# Patient Record
Sex: MALE | Race: WHITE | NOT HISPANIC OR LATINO | Employment: FULL TIME | URBAN - METROPOLITAN AREA
[De-identification: names, ages, dates, MRNs, and addresses within clinical notes are randomized per-mention and may not be internally consistent; named-entity substitution may affect disease eponyms.]

---

## 2017-03-07 ENCOUNTER — DOCUMENTATION ONLY (OUTPATIENT)
Dept: FAMILY MEDICINE | Facility: CLINIC | Age: 62
End: 2017-03-07

## 2017-03-07 NOTE — PROGRESS NOTES
Pre-Visit Chart Review  For Appointment Scheduled on 3-8-17    Health Maintenance Due   Topic Date Due    TETANUS VACCINE  11/28/1972    Colonoscopy  02/01/2016    Influenza Vaccine  08/01/2016

## 2017-03-08 ENCOUNTER — OFFICE VISIT (OUTPATIENT)
Dept: FAMILY MEDICINE | Facility: CLINIC | Age: 62
End: 2017-03-08
Payer: COMMERCIAL

## 2017-03-08 ENCOUNTER — LAB VISIT (OUTPATIENT)
Dept: LAB | Facility: HOSPITAL | Age: 62
End: 2017-03-08
Attending: FAMILY MEDICINE
Payer: COMMERCIAL

## 2017-03-08 VITALS
BODY MASS INDEX: 27.48 KG/M2 | HEART RATE: 60 BPM | SYSTOLIC BLOOD PRESSURE: 112 MMHG | DIASTOLIC BLOOD PRESSURE: 73 MMHG | WEIGHT: 175.06 LBS | HEIGHT: 67 IN | TEMPERATURE: 98 F

## 2017-03-08 DIAGNOSIS — Z12.5 PROSTATE CANCER SCREENING: ICD-10-CM

## 2017-03-08 DIAGNOSIS — G47.00 INSOMNIA, UNSPECIFIED TYPE: ICD-10-CM

## 2017-03-08 DIAGNOSIS — D64.9 ANEMIA, UNSPECIFIED TYPE: ICD-10-CM

## 2017-03-08 DIAGNOSIS — K21.9 GASTROESOPHAGEAL REFLUX DISEASE, ESOPHAGITIS PRESENCE NOT SPECIFIED: Primary | ICD-10-CM

## 2017-03-08 DIAGNOSIS — M10.9 GOUT, ARTHRITIS: ICD-10-CM

## 2017-03-08 DIAGNOSIS — I10 ESSENTIAL HYPERTENSION: ICD-10-CM

## 2017-03-08 DIAGNOSIS — J01.00 ACUTE NON-RECURRENT MAXILLARY SINUSITIS: ICD-10-CM

## 2017-03-08 DIAGNOSIS — R13.10 DYSPHAGIA, UNSPECIFIED TYPE: ICD-10-CM

## 2017-03-08 DIAGNOSIS — E78.5 HYPERLIPIDEMIA, UNSPECIFIED HYPERLIPIDEMIA TYPE: ICD-10-CM

## 2017-03-08 DIAGNOSIS — M62.838 NECK MUSCLE SPASM: ICD-10-CM

## 2017-03-08 LAB
ALBUMIN SERPL BCP-MCNC: 3.5 G/DL
ALP SERPL-CCNC: 51 U/L
ALT SERPL W/O P-5'-P-CCNC: 34 U/L
ANION GAP SERPL CALC-SCNC: 8 MMOL/L
AST SERPL-CCNC: 36 U/L
BASOPHILS # BLD AUTO: 0.02 K/UL
BASOPHILS NFR BLD: 0.3 %
BILIRUB SERPL-MCNC: 0.8 MG/DL
BUN SERPL-MCNC: 15 MG/DL
CALCIUM SERPL-MCNC: 9.4 MG/DL
CHLORIDE SERPL-SCNC: 107 MMOL/L
CHOLEST/HDLC SERPL: 4.1 {RATIO}
CO2 SERPL-SCNC: 27 MMOL/L
COMPLEXED PSA SERPL-MCNC: 0.4 NG/ML
CREAT SERPL-MCNC: 1 MG/DL
DIFFERENTIAL METHOD: ABNORMAL
EOSINOPHIL # BLD AUTO: 0.2 K/UL
EOSINOPHIL NFR BLD: 3 %
ERYTHROCYTE [DISTWIDTH] IN BLOOD BY AUTOMATED COUNT: 13.8 %
EST. GFR  (AFRICAN AMERICAN): >60 ML/MIN/1.73 M^2
EST. GFR  (NON AFRICAN AMERICAN): >60 ML/MIN/1.73 M^2
GLUCOSE SERPL-MCNC: 100 MG/DL
HCT VFR BLD AUTO: 41.2 %
HDL/CHOLESTEROL RATIO: 24.6 %
HDLC SERPL-MCNC: 171 MG/DL
HDLC SERPL-MCNC: 42 MG/DL
HGB BLD-MCNC: 13.2 G/DL
LDLC SERPL CALC-MCNC: 111 MG/DL
LYMPHOCYTES # BLD AUTO: 1.8 K/UL
LYMPHOCYTES NFR BLD: 29.2 %
MCH RBC QN AUTO: 30.1 PG
MCHC RBC AUTO-ENTMCNC: 32 %
MCV RBC AUTO: 94 FL
MONOCYTES # BLD AUTO: 0.7 K/UL
MONOCYTES NFR BLD: 12.1 %
NEUTROPHILS # BLD AUTO: 3.3 K/UL
NEUTROPHILS NFR BLD: 55.2 %
NONHDLC SERPL-MCNC: 129 MG/DL
PLATELET # BLD AUTO: 188 K/UL
PMV BLD AUTO: 10.3 FL
POTASSIUM SERPL-SCNC: 4.4 MMOL/L
PROT SERPL-MCNC: 7.3 G/DL
RBC # BLD AUTO: 4.38 M/UL
SODIUM SERPL-SCNC: 142 MMOL/L
TRIGL SERPL-MCNC: 90 MG/DL
URATE SERPL-MCNC: 4.9 MG/DL
WBC # BLD AUTO: 6.03 K/UL

## 2017-03-08 PROCEDURE — 1160F RVW MEDS BY RX/DR IN RCRD: CPT | Mod: S$GLB,,, | Performed by: FAMILY MEDICINE

## 2017-03-08 PROCEDURE — 80061 LIPID PANEL: CPT

## 2017-03-08 PROCEDURE — 84153 ASSAY OF PSA TOTAL: CPT

## 2017-03-08 PROCEDURE — 99999 PR PBB SHADOW E&M-EST. PATIENT-LVL III: CPT | Mod: PBBFAC,,, | Performed by: FAMILY MEDICINE

## 2017-03-08 PROCEDURE — 80053 COMPREHEN METABOLIC PANEL: CPT

## 2017-03-08 PROCEDURE — 85025 COMPLETE CBC W/AUTO DIFF WBC: CPT

## 2017-03-08 PROCEDURE — 84550 ASSAY OF BLOOD/URIC ACID: CPT

## 2017-03-08 PROCEDURE — 99214 OFFICE O/P EST MOD 30 MIN: CPT | Mod: 25,S$GLB,, | Performed by: FAMILY MEDICINE

## 2017-03-08 PROCEDURE — 36415 COLL VENOUS BLD VENIPUNCTURE: CPT | Mod: PO

## 2017-03-08 PROCEDURE — 3078F DIAST BP <80 MM HG: CPT | Mod: S$GLB,,, | Performed by: FAMILY MEDICINE

## 2017-03-08 PROCEDURE — 96372 THER/PROPH/DIAG INJ SC/IM: CPT | Mod: S$GLB,,, | Performed by: FAMILY MEDICINE

## 2017-03-08 PROCEDURE — 3074F SYST BP LT 130 MM HG: CPT | Mod: S$GLB,,, | Performed by: FAMILY MEDICINE

## 2017-03-08 RX ORDER — TIZANIDINE HYDROCHLORIDE 4 MG/1
1 CAPSULE, GELATIN COATED ORAL 3 TIMES DAILY PRN
Qty: 45 CAPSULE | Status: SHIPPED | OUTPATIENT
Start: 2017-03-08 | End: 2017-03-18

## 2017-03-08 RX ORDER — AMOXICILLIN AND CLAVULANATE POTASSIUM 875; 125 MG/1; MG/1
1 TABLET, FILM COATED ORAL 2 TIMES DAILY
Qty: 20 TABLET | Refills: 0 | Status: SHIPPED | OUTPATIENT
Start: 2017-03-08 | End: 2017-03-27

## 2017-03-08 RX ORDER — METHYLPREDNISOLONE ACETATE 40 MG/ML
60 INJECTION, SUSPENSION INTRA-ARTICULAR; INTRALESIONAL; INTRAMUSCULAR; SOFT TISSUE
Status: COMPLETED | OUTPATIENT
Start: 2017-03-08 | End: 2017-03-08

## 2017-03-08 RX ORDER — OMEPRAZOLE 20 MG/1
20 CAPSULE, DELAYED RELEASE ORAL DAILY
Qty: 30 CAPSULE | Refills: 5 | Status: SHIPPED | OUTPATIENT
Start: 2017-03-08 | End: 2017-04-27 | Stop reason: SDUPTHER

## 2017-03-08 RX ORDER — ZOLPIDEM TARTRATE 10 MG/1
10 TABLET ORAL NIGHTLY PRN
Qty: 30 TABLET | Refills: 2 | Status: SHIPPED | OUTPATIENT
Start: 2017-03-08 | End: 2017-12-22 | Stop reason: SDUPTHER

## 2017-03-08 RX ADMIN — METHYLPREDNISOLONE ACETATE 60 MG: 40 INJECTION, SUSPENSION INTRA-ARTICULAR; INTRALESIONAL; INTRAMUSCULAR; SOFT TISSUE at 08:03

## 2017-03-08 NOTE — MR AVS SNAPSHOT
[facility]  [address]  [place] [address]  Phone: [phone]  Fax: [phone]                  Ang Oden Jr.   3/8/2017 8:20 AM   Office Visit    Description:  Male : 1955   Provider:  Jordana Anderson MD   Department:  [facility]           Reason for Visit     Follow-up     Neck Pain     Cough           Diagnoses this Visit        Comments    Gastroesophageal reflux disease, esophagitis presence not specified    -  Primary     Dysphagia, unspecified type         Anemia, unspecified type         Essential hypertension         Hyperlipidemia, unspecified hyperlipidemia type         Gout, arthritis         Prostate cancer screening         Insomnia, unspecified type         Acute non-recurrent maxillary sinusitis         Neck muscle spasm                To Do List           Future Appointments        Provider Department Dept Phone    3/27/2017 3:30 PM Christian Santiago MD [facility] [phone]    2017 4:20 PM Jordana Anderson MD [facility] [phone]      Goals (5 Years of Data)     None      Follow-Up and Disposition     Return in about 6 months (around 2017).       These Medications        Disp Refills Start End    omeprazole (PRILOSEC) 20 MG capsule 30 capsule 5 3/8/2017 3/8/2018    Take 1 capsule (20 mg total) by mouth once daily. - Oral    Pharmacy: [facility]/pharmacy #[id number]  [place] - [address] [address]. Ph #: [phone]       zolpidem (AMBIEN) 10 mg Tab 30 tablet 2 3/8/2017     Take 1 tablet (10 mg total) by mouth nightly as needed. - Oral    Pharmacy: [facility]/pharmacy [id number]  [place] - [address] [facility] [address]. Ph #: [phone]       amoxicillin-clavulanate 875-125mg (AUGMENTIN) 875-125 mg per tablet 20 tablet 0 3/8/2017     Take 1 tablet by mouth 2 (two) times daily. - Oral    Pharmacy: [facility]/pharmacy #[id number] - [place]  [address] [facility]. Ph #: [phone]       tizanidine 4 mg Cap 45 capsule prn 3/8/2017  3/18/2017    Take 1 capsule by mouth 3 (three) times daily as needed. - Oral    Pharmacy: [facility]/pharmacy #[id number] - [place], [place] - [address]  #: [phone]         [facility]     [facility] On [facility] Care Line - 24/7 Assistance  Registered nurses in the [facility] provide clinical advisement, health education, appointment booking, and other advisory services.  Call for this free service at [phone].             Medications           Message regarding Medications     Verify the changes and/or additions to your medication regime listed below are the same as discussed with your clinician today.  If any of these changes or additions are incorrect, please notify your healthcare provider.        START taking these NEW medications        Refills    omeprazole (PRILOSEC) 20 MG capsule 5    Sig: Take 1 capsule (20 mg total) by mouth once daily.    Class: Normal    Route: Oral    amoxicillin-clavulanate 875-125mg (AUGMENTIN) 875-125 mg per tablet 0    Sig: Take 1 tablet by mouth 2 (two) times daily.    Class: Normal    Route: Oral    tizanidine 4 mg Cap prn    Sig: Take 1 capsule by mouth 3 (three) times daily as needed.    Class: Normal    Route: Oral      These medications were administered today        Dose Freq    methylPREDNISolone acetate injection 60 mg 60 mg Clinic/HOD 1 time    Sig: Inject 1.5 mLs (60 mg total) into the muscle one time.    Class: Normal    Route: Intramuscular      STOP taking these medications     buPROPion (WELLBUTRIN XL) 150 MG TB24 tablet Take 1 tablet (150 mg total) by mouth once daily.           Verify that the below list of medications is an accurate representation of the medications you are currently taking.  If none reported, the list may be blank. If incorrect, please contact your healthcare provider. Carry this list with you in case of emergency.           Current Medications     allopurinol (ZYLOPRIM) 300 MG tablet Take 1 tablet (300 mg total) by mouth once  "daily.    amlodipine-benazepril 5-20 mg (LOTREL) 5-20 mg per capsule Take 1 capsule by mouth once daily.    aspirin (ASPIR-81) 81 MG EC tablet Take by mouth. 1 Tablet, Delayed Release (E.C.) Oral Every day    citalopram (CELEXA) 20 MG tablet Take 20 mg by mouth once daily.    dextromethorphan-guaifenesin  mg (MUCINEX DM)  mg per 12 hr tablet Take 1 tablet by mouth every 12 (twelve) hours.    fluticasone (FLONASE) 50 mcg/actuation nasal spray 1 spray by Each Nare route once daily.    ibuprofen (ADVIL,MOTRIN) 800 MG tablet TAKE 1 TABLET BY MOUTH EVERY 6 TO 8 HOURS AS NEEDED FOR PAIN    IRON,CARBONYL/ASCORBIC ACID (IRON-VITAMIN C ORAL) Take by mouth.    ketoconazole (NIZORAL) 2 % cream Apply to affected area as directed bid    nebivolol (BYSTOLIC) 10 MG Tab Take 1 tablet (10 mg total) by mouth once daily.    simvastatin (ZOCOR) 10 MG tablet Take 1 tablet (10 mg total) by mouth every evening.    zolpidem (AMBIEN) 10 mg Tab Take 1 tablet (10 mg total) by mouth nightly as needed.    amoxicillin-clavulanate 875-125mg (AUGMENTIN) 875-125 mg per tablet Take 1 tablet by mouth 2 (two) times daily.    omeprazole (PRILOSEC) 20 MG capsule Take 1 capsule (20 mg total) by mouth once daily.    tizanidine 4 mg Cap Take 1 capsule by mouth 3 (three) times daily as needed.           Clinical Reference Information           Your Vitals Were     BP Pulse Temp Height Weight BMI    112/73 (BP Location: Right arm, Patient Position: Sitting, BP Method: Automatic) 60 98.1 °F (36.7 °C) (Oral) 5' 7" (1.702 m) 79.4 kg (175 lb 0.7 oz) 27.42 kg/m2      Blood Pressure          Most Recent Value    BP  112/73      Allergies as of 3/8/2017     No Known Drug Allergies      Immunizations Administered on Date of Encounter - 3/8/2017     None      Orders Placed During Today's Visit      Normal Orders This Visit    Ambulatory referral to Gastroenterology     Future Labs/Procedures Expected by Expires    CBC auto differential  3/8/2017 5/7/2018 "    Comprehensive metabolic panel  3/8/2017 5/7/2018    Lipid panel  3/8/2017 5/7/2018    PSA, Screening  3/8/2017 5/7/2018    Uric acid  3/8/2017 5/7/2018      Administrations This Visit     methylPREDNISolone acetate injection 60 mg     Admin Date Action Dose Route Administered By             03/08/2017 Given 60 mg Intramuscular Altagracia Siu LPN                      Language Assistance Services     ATTENTION: Language assistance services are available, free of charge. Please call [phone].      ATENCIÓN: Si hillla opalchris, tiene a posey disposición servicios gratuitos de asistencia lingüística. Llame al [phone].     CHÚ Ý: N?u b?n nói Ti?ng Vi?t, có các d?ch v? h? tr? ngôn ng? mi?n phí dành cho b?n. G?i s? [phone].         [facility] complies with applicable Federal civil rights laws and does not discriminate on the basis of race, color, national origin, age, disability, or sex.

## 2017-03-08 NOTE — NURSING
2 Patient identifiers used (name & ). Administered 60 mg Depo Medrol IM. Patient tolerated well. No bleeding at insertion site noted. Pain scale 0/10. Allergies reviewed. Aseptic technique maintained.

## 2017-03-13 NOTE — PROGRESS NOTES
Subjective:       Patient ID: Ang Oden Jr. is a 61 y.o. male.    Chief Complaint: Follow-up; Neck Pain; and Cough    Patient Active Problem List   Diagnosis    Olecranon bursitis    Gout, arthritis    Hyperlipidemia    Anemia, Hgb 13.1    Lumbago due to displacement of intervertebral disc    Male hypogonadism    Alcohol abuse with intoxication, daily    Alcohol induced fatty liver    KYLEIGH (obstructive sleep apnea), not using CPAP    Kidney calculi    BPH (benign prostatic hypertrophy)    Diverticulosis    Cardiovascular risk factor, FCVRS 5% on Zocor    Insomnia    Anxiety    Allergic rhinitis    Essential hypertension    History of alcohol abuse   C/o recurrence of same sx as esophageal stricture-chokes on solid foods, get stuck    HPI  Review of Systems   Constitutional: Positive for fever. Negative for chills.   HENT: Positive for postnasal drip, rhinorrhea, sinus pressure and sneezing. Negative for ear discharge, ear pain and sore throat.    Respiratory: Negative for cough.    Neurological: Positive for headaches.       Objective:      Physical Exam   Constitutional: He appears well-developed and well-nourished.   HENT:   Right Ear: Tympanic membrane and ear canal normal.   Left Ear: Tympanic membrane and ear canal normal.   Nose: Mucosal edema present. Right sinus exhibits maxillary sinus tenderness. Right sinus exhibits no frontal sinus tenderness. Left sinus exhibits maxillary sinus tenderness. Left sinus exhibits no frontal sinus tenderness.   Mouth/Throat: Mucous membranes are normal. Posterior oropharyngeal erythema present. No oropharyngeal exudate, posterior oropharyngeal edema or tonsillar abscesses.   Cardiovascular: Normal rate, regular rhythm and normal heart sounds.    Pulmonary/Chest: Effort normal and breath sounds normal.   Skin: Skin is warm.   Psychiatric: He has a normal mood and affect.   Vitals reviewed.      Assessment:       1. Gastroesophageal reflux disease,  esophagitis presence not specified    2. Dysphagia, unspecified type    3. Anemia, unspecified type    4. Essential hypertension    5. Hyperlipidemia, unspecified hyperlipidemia type    6. Gout, arthritis    7. Prostate cancer screening    8. Insomnia, unspecified type    9. Acute non-recurrent maxillary sinusitis    10. Neck muscle spasm        Plan:       1. Gastroesophageal reflux disease, esophagitis presence not specified  Refer for eval and treat  - Ambulatory referral to Gastroenterology  - omeprazole (PRILOSEC) 20 MG capsule; Take 1 capsule (20 mg total) by mouth once daily.  Dispense: 30 capsule; Refill: 5    2. Dysphagia, unspecified type  Refer for eval and treatment  - Ambulatory referral to Gastroenterology    3. Anemia, unspecified type  Screen and treat as indicated:    - CBC auto differential; Future    4. Essential hypertension  Controlled on current medications.  Continue current medications.      5. Hyperlipidemia, unspecified hyperlipidemia type  Stable condition.  Continue current medications.  Will adjust based on lab findings or if condition changes.    - Lipid panel; Future  - Comprehensive metabolic panel; Future    6. Gout, arthritis  Stable condition.  Continue current medications.  Will adjust based on lab findings or if condition changes.    - Uric acid; Future    7. Prostate cancer screening  Screen and treat as indicated:  Discussed benefits, risks and limitations of PSA testing and current USPSTF guidelines.  Patient expressed verbal understanding and wished to pursue screening.    - PSA, Screening; Future    8. Insomnia, unspecified type  Controlled on current medications.  Continue current medications.    - zolpidem (AMBIEN) 10 mg Tab; Take 1 tablet (10 mg total) by mouth nightly as needed.  Dispense: 30 tablet; Refill: 2    9. Acute non-recurrent maxillary sinusitis  Treat:  - amoxicillin-clavulanate 875-125mg (AUGMENTIN) 875-125 mg per tablet; Take 1 tablet by mouth 2 (two) times  daily.  Dispense: 20 tablet; Refill: 0  - methylPREDNISolone acetate injection 60 mg; Inject 1.5 mLs (60 mg total) into the muscle one time.    10. Neck muscle spasm  Use prn  - tizanidine 4 mg Cap; Take 1 capsule by mouth 3 (three) times daily as needed.  Dispense: 45 capsule; Refill: prn    Reeval 6 months or sooner prn

## 2017-03-17 ENCOUNTER — TELEPHONE (OUTPATIENT)
Dept: FAMILY MEDICINE | Facility: CLINIC | Age: 62
End: 2017-03-17

## 2017-03-17 NOTE — TELEPHONE ENCOUNTER
----- Message from Carmen Olvera sent at 3/17/2017 12:35 PM CDT -----  Contact: self  Needs a refill on antibiotics, states he is still sick.  Please call back at     [facility]/pharmacy #[id number] - [place] - [address]  [address] [address]  Phone: [phone] Fax: [phone]

## 2017-03-17 NOTE — TELEPHONE ENCOUNTER
Patient seen 3/8/17 and was treated for sinusitis. He will finish his augmentin tomorrow AM. Pateint is still having sinus stuffiness, cough and post nasal drip(causing the cough).  He reports that he is afebrile. Mucus is now clear. Patient requesting additional medication. Please advise.

## 2017-03-17 NOTE — TELEPHONE ENCOUNTER
If he has no fever or pain and discharge is clear then I suspect allergic rhinitis.  Recommend otc non-sedating antihistamine such as Loratadine and/or steroid nasal spray such as Flonase as directed and as needed.  Please return to clinic if symptoms persist after these interventions.

## 2017-03-21 NOTE — TELEPHONE ENCOUNTER
Patient read message as written, patient verbalized understanding. Will call Friday and advise us as to how he feels.

## 2017-03-22 ENCOUNTER — TELEPHONE (OUTPATIENT)
Dept: FAMILY MEDICINE | Facility: CLINIC | Age: 62
End: 2017-03-22

## 2017-03-22 NOTE — TELEPHONE ENCOUNTER
----- Message from Sakina Damon sent at 3/22/2017  7:12 AM CDT -----  Patient is returning nurses call regarding over the counter medication he was recommended to purchase for his sinus issues, he is requesting a message be left on his phone including name of medication and how to spell it, he states he can not remember what was recommended.  Contact patient at [phone].    Thank you

## 2017-03-22 NOTE — TELEPHONE ENCOUNTER
Called and left message as requested with the names of the medications suggested by Dr Anderson on 3/17/17 to treat the patients allergic rhinitis. Loratidine and/or Flonase.

## 2017-03-27 ENCOUNTER — INITIAL CONSULT (OUTPATIENT)
Dept: GASTROENTEROLOGY | Facility: CLINIC | Age: 62
End: 2017-03-27
Payer: COMMERCIAL

## 2017-03-27 ENCOUNTER — TELEPHONE (OUTPATIENT)
Dept: FAMILY MEDICINE | Facility: CLINIC | Age: 62
End: 2017-03-27

## 2017-03-27 VITALS
SYSTOLIC BLOOD PRESSURE: 104 MMHG | WEIGHT: 174.19 LBS | DIASTOLIC BLOOD PRESSURE: 76 MMHG | BODY MASS INDEX: 27.34 KG/M2 | HEIGHT: 67 IN | HEART RATE: 63 BPM | RESPIRATION RATE: 16 BRPM

## 2017-03-27 DIAGNOSIS — K70.0 ALCOHOL INDUCED FATTY LIVER: Primary | Chronic | ICD-10-CM

## 2017-03-27 DIAGNOSIS — R13.10 DYSPHAGIA, UNSPECIFIED TYPE: ICD-10-CM

## 2017-03-27 DIAGNOSIS — R13.10 DYSPHAGIA, UNSPECIFIED TYPE: Primary | ICD-10-CM

## 2017-03-27 PROCEDURE — 99999 PR PBB SHADOW E&M-EST. PATIENT-LVL III: CPT | Mod: PBBFAC,,, | Performed by: INTERNAL MEDICINE

## 2017-03-27 PROCEDURE — 1160F RVW MEDS BY RX/DR IN RCRD: CPT | Mod: S$GLB,,, | Performed by: INTERNAL MEDICINE

## 2017-03-27 PROCEDURE — 3074F SYST BP LT 130 MM HG: CPT | Mod: S$GLB,,, | Performed by: INTERNAL MEDICINE

## 2017-03-27 PROCEDURE — 99204 OFFICE O/P NEW MOD 45 MIN: CPT | Mod: S$GLB,,, | Performed by: INTERNAL MEDICINE

## 2017-03-27 PROCEDURE — 3078F DIAST BP <80 MM HG: CPT | Mod: S$GLB,,, | Performed by: INTERNAL MEDICINE

## 2017-03-27 RX ORDER — TIZANIDINE HYDROCHLORIDE 4 MG/1
CAPSULE, GELATIN COATED ORAL
Refills: 99 | COMMUNITY
Start: 2017-03-21 | End: 2017-09-06

## 2017-03-27 NOTE — TELEPHONE ENCOUNTER
----- Message from Nancy Shah sent at 3/27/2017  8:13 AM CDT -----  Contact: self  Patient called regarding medications and sinus problems. Told to use over the counter, for sinus problems and still having same issues.  Please contact [phone] ([email or web address])

## 2017-03-27 NOTE — PROGRESS NOTES
"Subjective:       Patient ID: Ang Oden Jr. is a 61 y.o. male.    This patient is new to me.  He has been seen in GI clinic in the past but this was greater than 3 years ago  Referring MD:  Dr. Anderson for dysphagia.        Chief Complaint: Dysphagia.    HPI Comments: Patient seen for dysphagia, occurring with solid foods, frequency increasing, alleviated/exacerbated by nothing in particulat, associated signs/symptoms including none, onset a few months ago.  He denies epigastric pain, weight loss, GI bleeding, change in bowel habits, or family history of GI cancer.  He has quit drinking and recent liver enzymes looked good.  His most recent imaging showed fatty liver.  No other complaints at this time.    He has been given a trial of PPI per notes from Dr. Anderson but states that he doesn't feel this is doing much.      Review of Systems   Constitutional: Negative for chills, fatigue and fever.   HENT: Positive for trouble swallowing.    Respiratory: Negative for cough, shortness of breath and wheezing.    Cardiovascular: Negative for chest pain and palpitations.   Gastrointestinal: Negative for abdominal pain, constipation, diarrhea, nausea and vomiting.   Musculoskeletal: Negative for arthralgias and myalgias.   Skin: Negative for color change and rash.   Neurological: Negative for dizziness, weakness and numbness.   Psychiatric/Behavioral: Negative for confusion. The patient is not nervous/anxious.    All other systems reviewed and are negative.      Objective:       Vitals:    03/27/17 1512   BP: 104/76   Pulse: 63   Resp: 16   Weight: 79 kg (174 lb 2.6 oz)   Height: 5' 7" (1.702 m)       Physical Exam   Constitutional: He is oriented to person, place, and time. He appears well-developed and well-nourished.   HENT:   Head: Normocephalic and atraumatic.   Mouth/Throat: Oropharynx is clear and moist.   Eyes: Conjunctivae are normal. Pupils are equal, round, and reactive to light. No scleral icterus.   Neck: " Normal range of motion. Neck supple. No thyromegaly present.   Cardiovascular: Normal rate and regular rhythm.    No murmur heard.  Pulmonary/Chest: Effort normal and breath sounds normal. He has no wheezes.   Abdominal: Soft. Bowel sounds are normal. He exhibits no distension. There is no tenderness. There is no rebound and no guarding.   Musculoskeletal: He exhibits no edema.   Lymphadenopathy:     He has no cervical adenopathy.   Neurological: He is alert and oriented to person, place, and time.   Skin: Skin is warm and dry. No rash noted. No erythema.   Psychiatric: He has a normal mood and affect. His behavior is normal.   Vitals reviewed.        Lab Results   Component Value Date    WBC 6.03 03/08/2017    HGB 13.2 (L) 03/08/2017    HCT 41.2 03/08/2017    MCV 94 03/08/2017     03/08/2017       Old records from Dr. Anderson reviewed and are as summarized above in the HPI.    Ultrasound was independently visualized and reviewed by me and showed fatty liver.    Assessment:       1. Alcohol induced fatty liver    2. Dysphagia, unspecified type        Plan:       1.  Continue PPI for now  2.  Avoid NSAIDs  3.  Antireflux precautions including avoidance of late night eating and lying down soon after eating.    4.  Schedule EGD with possible dilation  5.  Further recommendations to follow after above.  6.  Communication will be sent to the referring MD, Dr. Anderson regarding my assessment and plan on this patient via EPIC.

## 2017-03-27 NOTE — TELEPHONE ENCOUNTER
Patient was advised upon last phone call that if OTC medication did not clear his sinus issues he was to make a appointment to be re-evaluated. Offered patient multiple appointment times and patient declined each appointment that was offered. States he will call back for a appointment.

## 2017-03-27 NOTE — LETTER
March 27, 2017      Jordana Anderson MD  [address] [facility] [facility] [address]           [place] [facility]  [address]  [place] [address]  Phone: [phone]          Patient: Ang Oden Jr.   MR Number: [id number]   YOB: 1955   Date of Visit: 3/27/2017       Dear Dr. Jordana Anderson:    Thank you for referring Ang Oden to me for evaluation. Attached you will find relevant portions of my assessment and plan of care.    If you have questions, please do not hesitate to call me. I look forward to following Ang Oden along with you.    Sincerely,    Christian Santiago MD    Enclosure  CC:  No Recipients    If you would like to receive this communication electronically, please contact [email or web address] or [phone] to request more information on [email or web address] Link access.    For providers and/or their staff who would like to refer a patient to [facility], please contact us through our one-stop-shop provider referral line, [facility], at [phone].    If you feel you have received this communication in error or would no longer like to receive these types of communications, please e-mail [email or web address]

## 2017-03-28 DIAGNOSIS — R13.10 DYSPHAGIA, UNSPECIFIED TYPE: Primary | ICD-10-CM

## 2017-04-10 ENCOUNTER — ANESTHESIA (OUTPATIENT)
Dept: ENDOSCOPY | Facility: HOSPITAL | Age: 62
End: 2017-04-10
Payer: COMMERCIAL

## 2017-04-10 ENCOUNTER — SURGERY (OUTPATIENT)
Age: 62
End: 2017-04-10

## 2017-04-10 ENCOUNTER — HOSPITAL ENCOUNTER (OUTPATIENT)
Facility: HOSPITAL | Age: 62
Discharge: HOME | End: 2017-04-10
Attending: INTERNAL MEDICINE | Admitting: INTERNAL MEDICINE
Payer: COMMERCIAL

## 2017-04-10 ENCOUNTER — ANESTHESIA EVENT (OUTPATIENT)
Dept: ENDOSCOPY | Facility: HOSPITAL | Age: 62
End: 2017-04-10
Payer: COMMERCIAL

## 2017-04-10 VITALS — RESPIRATION RATE: 19 BRPM

## 2017-04-10 DIAGNOSIS — R13.10 DYSPHAGIA: ICD-10-CM

## 2017-04-10 DIAGNOSIS — K22.2 SCHATZKI'S RING: Primary | ICD-10-CM

## 2017-04-10 DIAGNOSIS — K29.70 GASTRITIS, PRESENCE OF BLEEDING UNSPECIFIED, UNSPECIFIED CHRONICITY, UNSPECIFIED GASTRITIS TYPE: ICD-10-CM

## 2017-04-10 DIAGNOSIS — K31.7 GASTRIC POLYP: ICD-10-CM

## 2017-04-10 DIAGNOSIS — K44.9 HIATAL HERNIA: ICD-10-CM

## 2017-04-10 PROCEDURE — D9220A PRA ANESTHESIA: Mod: ANES,,, | Performed by: ANESTHESIOLOGY

## 2017-04-10 PROCEDURE — D9220A PRA ANESTHESIA: Mod: CRNA,,, | Performed by: NURSE ANESTHETIST, CERTIFIED REGISTERED

## 2017-04-10 PROCEDURE — 25000003 PHARM REV CODE 250

## 2017-04-10 PROCEDURE — 88305 TISSUE EXAM BY PATHOLOGIST: CPT | Performed by: PATHOLOGY

## 2017-04-10 PROCEDURE — 37000009 HC ANESTHESIA EA ADD 15 MINS: Performed by: INTERNAL MEDICINE

## 2017-04-10 PROCEDURE — 37000008 HC ANESTHESIA 1ST 15 MINUTES: Performed by: INTERNAL MEDICINE

## 2017-04-10 PROCEDURE — 25000003 PHARM REV CODE 250: Performed by: INTERNAL MEDICINE

## 2017-04-10 PROCEDURE — 43239 EGD BIOPSY SINGLE/MULTIPLE: CPT | Mod: ,,, | Performed by: INTERNAL MEDICINE

## 2017-04-10 PROCEDURE — 27201012 HC FORCEPS, HOT/COLD, DISP: Performed by: INTERNAL MEDICINE

## 2017-04-10 PROCEDURE — 88342 IMHCHEM/IMCYTCHM 1ST ANTB: CPT | Mod: 26,,, | Performed by: PATHOLOGY

## 2017-04-10 PROCEDURE — 43248 EGD GUIDE WIRE INSERTION: CPT | Performed by: INTERNAL MEDICINE

## 2017-04-10 PROCEDURE — 43239 EGD BIOPSY SINGLE/MULTIPLE: CPT | Performed by: INTERNAL MEDICINE

## 2017-04-10 PROCEDURE — 63600175 PHARM REV CODE 636 W HCPCS

## 2017-04-10 PROCEDURE — 43248 EGD GUIDE WIRE INSERTION: CPT | Mod: ,,, | Performed by: INTERNAL MEDICINE

## 2017-04-10 RX ORDER — PROPOFOL 10 MG/ML
INJECTION, EMULSION INTRAVENOUS
Status: COMPLETED
Start: 2017-04-10 | End: 2017-04-10

## 2017-04-10 RX ORDER — SODIUM CHLORIDE 9 MG/ML
INJECTION, SOLUTION INTRAVENOUS CONTINUOUS
Status: DISCONTINUED | OUTPATIENT
Start: 2017-04-10 | End: 2017-04-10 | Stop reason: HOSPADM

## 2017-04-10 RX ORDER — LIDOCAINE HYDROCHLORIDE 20 MG/ML
INJECTION, SOLUTION EPIDURAL; INFILTRATION; INTRACAUDAL; PERINEURAL
Status: COMPLETED
Start: 2017-04-10 | End: 2017-04-10

## 2017-04-10 RX ADMIN — LIDOCAINE HYDROCHLORIDE 100 MG: 20 INJECTION, SOLUTION EPIDURAL; INFILTRATION; INTRACAUDAL; PERINEURAL at 07:04

## 2017-04-10 RX ADMIN — PROPOFOL 100 MG: 10 INJECTION, EMULSION INTRAVENOUS at 07:04

## 2017-04-10 RX ADMIN — PROPOFOL 50 MG: 10 INJECTION, EMULSION INTRAVENOUS at 07:04

## 2017-04-10 RX ADMIN — SODIUM CHLORIDE 1000 ML: 0.9 INJECTION, SOLUTION INTRAVENOUS at 07:04

## 2017-04-10 NOTE — IP AVS SNAPSHOT
[address] [place] [address]  Phone: [phone]           Patient Discharge Instructions   Our goal is to set you up for success. This packet includes information on your condition, medications, and your home care.  It will help you care for yourself to prevent having to return to the hospital.     Please ask your nurse if you have any questions.      There are many details to remember when preparing to leave the hospital. Here is what you will need to do:    1. Take your medicine. If you are prescribed medications, review your Medication List on the following pages. You may have new medications to  at the pharmacy and others that you'll need to stop taking. Review the instructions for how and when to take your medications. Talk with your doctor or nurses if you are unsure of what to do.     2. Go to your follow-up appointments. Specific follow-up information is listed in the following pages. Your may be contacted by a nurse or clinical provider about future appointments. Be sure we have all of the phone numbers to reach you. Please contact your provider's office if you are unable to make an appointment.     3. Watch for warning signs. Your doctor or nurse will give you detailed warning signs to watch for and when to call for assistance. These instructions may also include educational information about your condition. If you experience any of warning signs to your health, call your doctor.           [facility]  Unless otherwise directed by your provider, please   contact [facility], our nurse care line   that is available for 24/7 assistance.     [phone] (toll-free)     Registered nurses in the [facility]   provide: appointment scheduling, clinical advisement, health education, and other advisory services.                  ** Verify the list of medication(s) below is accurate and up to date. Carry this with you in case of  emergency. If your medications have changed, please notify your healthcare provider.             Medication List      CONTINUE taking these medications        Additional Info                      allopurinol 300 MG tablet   Commonly known as:  ZYLOPRIM   Quantity:  90 tablet   Refills:  3   Dose:  300 mg    Instructions:  Take 1 tablet (300 mg total) by mouth once daily.     Begin Date    AM    Noon    PM    Bedtime       amlodipine-benazepril 5-20 mg 5-20 mg per capsule   Commonly known as:  LOTREL   Quantity:  90 capsule   Refills:  3   Dose:  1 capsule    Instructions:  Take 1 capsule by mouth once daily.     Begin Date    AM    Noon    PM    Bedtime       ASPIR-81 81 MG EC tablet   Refills:  0   Generic drug:  aspirin    Instructions:  Take by mouth. 1 Tablet, Delayed Release (E.C.) Oral Every day     Begin Date    AM    Noon    PM    Bedtime       citalopram 20 MG tablet   Commonly known as:  CELEXA   Refills:  0   Dose:  20 mg    Instructions:  Take 20 mg by mouth once daily.     Begin Date    AM    Noon    PM    Bedtime       dextromethorphan-guaifenesin  mg  mg per 12 hr tablet   Commonly known as:  MUCINEX DM   Refills:  0   Dose:  1 tablet    Instructions:  Take 1 tablet by mouth every 12 (twelve) hours.     Begin Date    AM    Noon    PM    Bedtime       fluticasone 50 mcg/actuation nasal spray   Commonly known as:  FLONASE   Quantity:  3 Bottle   Refills:  3   Dose:  1 spray    Instructions:  1 spray by Each Nare route once daily.     Begin Date    AM    Noon    PM    Bedtime       ibuprofen 800 MG tablet   Commonly known as:  ADVIL,MOTRIN   Refills:  0    Instructions:  TAKE 1 TABLET BY MOUTH EVERY 6 TO 8 HOURS AS NEEDED FOR PAIN     Begin Date    AM    Noon    PM    Bedtime       IRON-VITAMIN C ORAL   Refills:  0    Instructions:  Take by mouth.     Begin Date    AM    Noon    PM    Bedtime       ketoconazole 2 % cream   Commonly known as:  NIZORAL   Quantity:  30 g   Refills:  2     Instructions:  Apply to affected area as directed bid     Begin Date    AM    Noon    PM    Bedtime       nebivolol 10 MG Tab   Commonly known as:  BYSTOLIC   Quantity:  90 tablet   Refills:  3   Dose:  10 mg    Instructions:  Take 1 tablet (10 mg total) by mouth once daily.     Begin Date    AM    Noon    PM    Bedtime       omeprazole 20 MG capsule   Commonly known as:  PRILOSEC   Quantity:  30 capsule   Refills:  5   Dose:  20 mg    Instructions:  Take 1 capsule (20 mg total) by mouth once daily.     Begin Date    AM    Noon    PM    Bedtime       simvastatin 10 MG tablet   Commonly known as:  ZOCOR   Quantity:  90 tablet   Refills:  3   Dose:  10 mg    Instructions:  Take 1 tablet (10 mg total) by mouth every evening.     Begin Date    AM    Noon    PM    Bedtime       tizanidine 4 mg Cap   Refills:  99    Instructions:  TAKE 1 CAPSULE BY MOUTH 3 (THREE) TIMES DAILY AS NEEDED.     Begin Date    AM    Noon    PM    Bedtime       zolpidem 10 mg Tab   Commonly known as:  AMBIEN   Quantity:  30 tablet   Refills:  2   Dose:  10 mg    Instructions:  Take 1 tablet (10 mg total) by mouth nightly as needed.     Begin Date    AM    Noon    PM    Bedtime                  Please bring to all follow up appointments:    1. A copy of your discharge instructions.  2. All medicines you are currently taking in their original bottles.  3. Identification and insurance card.    Please arrive 15 minutes ahead of scheduled appointment time.    Please call 24 hours in advance if you must reschedule your appointment and/or time.        Your Scheduled Appointments     Sep 06, 2017  4:20 PM CDT   Established Patient Visit with MD [facility] [facility] - [facility] ([facility])    [address]  [place] [id number] [phone]              Follow-up Information     Follow up with Christian Santiago MD.    Specialty:  Gastroenterology    Why:  As needed    Contact information:    [address]  [address]  [place]  [address]  [phone]          Discharge Instructions     Future Orders    Activity as tolerated     Diet general     Questions:    Total calories:      Fat restriction, if any:      Protein restriction, if any:      Na restriction, if any:      Fluid restriction:      Additional restrictions:          Discharge Instructions         Esophageal Dilation     A balloon dilator may be used to widen a stricture in the esophagus.   An esophageal dilation is a procedure used to widen a narrowed section of your esophagus. This is the tube that leads from your throat to your stomach. Narrowing (stricture) of the esophagus can cause problems. These include trouble swallowing. This sheet explains what to expect with esophageal dilation.  Why esophageal dilation is needed  Several problems can be treated with esophageal dilation. They include:  · Peptic stricture. This is caused by reflux esophagitis. With this problem, the esophagus is irritated by acid reflux (heartburn). This occurs when acid from your stomach flows back up into the esophagus. Stomach acid damages the lining of the esophagus. This leads to a buildup of scar tissue. As a result, the esophagus is narrowed.  · Schatzkis ring. This is an abnormal ring of tissue. It forms where the esophagus meets the stomach. It can cause trouble swallowing. It can also cause food to get stuck in the esophagus. The cause of this condition is not known.  · Achalasia. This condition stops food and liquids from moving into your stomach from the esophagus. It affects the lower esophageal sphincter (LES). The LES is a muscular ring that opens (relaxes) when you swallow. With achalasia, the LES does not relax. This condition can also cause problems with peristalsis. This is the normal muscular action of the esophagus that moves food into the stomach.  · Eosinophilic esophagitis. This is a redness and swelling (inflammation) in the esophagus. It is caused by an environmental trigger  such as a food allergy. It can lead to pain, trouble swallowing, and strictures.  · Less common causes of stricture. Other causes of stricture include radiation treatment and cancer.  Before you have esophageal dilation  · Tell your provider about any medicines you take. This includes prescription medicines, over-the-counter medicines, herbs, vitamins, and other supplements. Be sure to mention aspirin or any blood thinners youre taking.  · Let your provider know if you need to take antibiotics before dental procedures. You may need to take them before esophageal dilation as well.  · Tell your provider about any health conditions you have, such as heart or lung disease. Also mention any allergies to medicines.  · Youll need to have an empty stomach for the procedure. Follow your providers instructions for not eating and drinking before the procedure.  · Arrange to have a family member or friend drive you home after the procedure.  During the procedure  · You may be given local anesthesia to numb your throat. Youll also likely be given medicine to relax you. The procedure takes about 15 minutes. It does not cause trouble breathing.  · A tube called an endoscope (scope) is used. This is a narrow tube with a tiny light and camera at the end. The scope is inserted through your mouth and into your esophagus. It lets your provider see inside your esophagus. To help guide your provider, an imaging method called fluoroscopy may also be used. This creates a moving X-ray image on a computer screen.   · Next, special tiny tools are carefully guided through your mouth and down into the esophagus. They widen the stricture and are then removed. Different types of instruments are used. The type used depends on the size and cause of the stricture. Types include:  ¨ Balloon dilator. A tiny empty balloon is put into the stricture using an endoscope. The balloon is slowly filled with air. The air is removed from the balloon when  the stricture is widened to the right size. Balloon dilators are used to treat many types of strictures.  ¨ Guided wire dilator. A thin wire is eased down the esophagus. A small tube thats wider on one end is guided down the wire. It is put into the stricture to stretch it. These dilators are used to treat all kinds of strictures.  ¨ Bougies. These are weighted, cone-shaped tubes. Starting with smaller cones, your provider uses increasingly larger cones until the stricture is stretched the right amount. Bougies are often used to treat strictures that are simple (short, straight, and not very narrow).  After the procedure  · Youll be watched closely until your provider says youre ready to go home. Youll need to have a friend or family member drive you home.  · You may have a sore throat for the rest of the day.  · You may have pain behind your breastbone for a short time afterwards.  · You can start drinking fluids again after the numbness in your throat goes away. You can resume eating the same day or the next day.  Risks and possible complications  Risks and possible complications for esophageal dilation include:  · Infection  · A tear or hole in the esophagus lining, causing bleeding and possibly needing surgery to fix  · Risks of anesthesia  Follow-up  You may need to have the procedure repeated one or more times. This depends on the cause and extent of the narrowing. Repeat procedures can allow the dilation to take place more slowly. This reduces the risks of the procedure.  If your stricture was caused by reflux esophagitis, youll likely need to take medicine to treat that condition. Your provider will tell you more.  When to call your provider  Call your healthcare provider right away if you have any of the following after the procedure:  · Fever of 100.4°F (38.0°C)  · Chest pain  · Trouble swallowing  · Vomiting blood or material that looks like coffee grounds  · Bleeding  · Black, tarry, or  bloody stools   Date Last Reviewed: 7/1/2016 © 2000-2016 [employer]. [address] [place] [address], [place] [address]. All rights reserved. This information is not intended as a substitute for professional medical care. Always follow your healthcare professional's instructions.        What Is a Hiatal Hernia?    Hiatal hernia is when the area where the stomach and esophagus meet bulges up through the diaphragm into the chest cavity. In some cases, part of the stomach may bulge above the diaphragm. Stomach acid may move up into the esophagus and cause symptoms. The symptoms are often blamed on gastroesophageal reflux disease (GERD). You may only know about the hernia when it shows up on an X-ray taken for other reasons.   What you may feel  The hiatus is a normal hole in the diaphragm. The esophagus passes through this hole and leads to the stomach. In some cases, part of the stomach may bulge above the diaphragm. This bulge is called a hernia. Stomach acid may move up into the esophagus and cause symptoms.  When you eat, the muscle at the hiatus relaxes to allow food to pass into the stomach. It tightens again to keep food and digestive acids in the stomach.  Many people with hiatal hernias have mild symptoms. You may notice the following GERD symptoms:  · Heartburn or other chest discomfort  · A feeling of chest fullness after a meal  · Frequent burping  · Acid taste in the mouth  · Trouble swallowing  Treating symptoms  If you have been diagnosed with hiatal hernia, these suggestions may help improve symptoms:  · Lose excess weight. Extra weight puts pressure on the stomach and esophagus.  · Dont lie down after eating. Sit up for at least an hour after eating. Lying down after eating can increase symptoms.  · Avoid certain foods and drinks. These include fatty foods, chocolate, coffee, mint, and other foods that cause symptoms for you.  · Dont smoke or drink alcohol. These can worsen symptoms.  · Look  at your medicines. Discuss your medicines with your healthcare provider. Many medicines can cause symptoms.  · Consider an antacid medicine. Ask your healthcare provider about over-the-counter and prescription medicines that may help.  · Ask about surgery, if needed. Surgery is a treatment choice for some people. Your healthcare provider can determine if surgery is an option for you.    Date Last Reviewed: 10/1/2016  © 3459-0155 [employer]. [address] [place] [address], [place] [address]. All rights reserved. This information is not intended as a substitute for professional medical care. Always follow your healthcare professional's instructions.        Gastritis (Adult)    Gastritis is inflammation and irritation of the stomach lining. It can be present for a short time (acute) or be long lasting (chronic). Gastritis is often caused by infection with bacteria called H pylori. More than a third of people in the  have this bacteria in their bodies. In many cases, H pylori causes no problems or symptoms. In some people, though, the infection irritates the stomach lining and causes gastritis. Other causes of stomach irritation include drinking alcohol or taking pain-relieving medicines called NSAIDs (such as aspirin or ibuprofen).   Symptoms of gastritis can include:  · Abdominal pain or bloating  · Loss of appetite  · Nausea or vomiting  · Vomiting blood or having black stools  · Feeling more tired than usual  An inflamed and irritated stomach lining is more likely to develop a sore called an ulcer. To help prevent this, gastritis should be treated.  Home care  If needed, medicines may be prescribed. If you have H pylori infection, treating it will likely relieve your symptoms. Other changes can help reduce stomach irritation and help it heal.  · If you have been prescribed medicines for H pylori infection, take them as directed. Take all of the medicine until it is finished or your healthcare provider tells  you to stop, even if you feel better.  · Your healthcare provider may recommend avoiding NSAIDs. If you take daily aspirin for your heart or other medical reasons, do not stop without talking to your healthcare provider first.  · Avoid drinking alcohol.  · Stop smoking. Smoking can irritate the stomach and delay healing. As much as possible, stay away from second hand smoke.  Follow-up care  Follow up with your healthcare provider, or as advised by our staff. Testing may be needed to check for inflammation or an ulcer.  When to seek medical advice  Call your healthcare provider for any of the following:  · Stomach pain that gets worse or moves to the lower right abdomen (appendix area)  · Chest pain that appears or gets worse, or spreads to the back, neck, shoulder, or arm  · Frequent vomiting (cant keep down liquids)  · Blood in the stool or vomit (red or black in color)  · Feeling weak or dizzy  · Fever of 100.4ºF (38ºC) or higher, or as directed by your healthcare provider  Date Last Reviewed: 6/22/2015 © 2000-2016 [employer]. [address] [place] [address]. All rights reserved. This information is not intended as a substitute for professional medical care. Always follow your healthcare professional's instructions.        Upper GI Endoscopy     During endoscopy, a long, flexible tube is used to view the inside of your upper GI tract.      Upper GI endoscopy allows your healthcare provider to look directly into the beginning of your gastrointestinal (GI) tract. The esophagus, stomach, and duodenum (the first part of the small intestine) make up the upper GI tract.   Before the exam  Follow these and any other instructions you are given before your endoscopy. If you dont follow the healthcare providers instructions carefully, the test may need to be canceled or done over:  · Don't eat or drink anything after midnight the night before your exam. If your exam is in the afternoon, drink  only clear liquids in the morning. Don't eat or drink anything for 8 hours before the exam. In some cases, you may be able to take medicines with sips of water until 2 hours before the procedure. Speak with your healthcare provider about this.   · Bring your X-rays and any other test results you have.  · Because you will be sedated, arrange for an adult to drive you home after the exam.  · Tell your healthcare provider before the exam if you are taking any medicines or have any medical problems.  The procedure  Here is what to expect:  · You will lie on the endoscopy table. Usually patients lie on the left side.  · You will be monitored and given oxygen.  · Your throat may be numbed with a spray or gargle. You are given medicine through an intravenous (IV) line that will help you relax and remain comfortable. You may be awake or asleep during the procedure.  · The healthcare provider will put the endoscope in your mouth and down your esophagus. It is thinner than most pieces of food that you swallow. It will not affect your breathing. The medicine helps keep you from gagging.  · Air is put into your GI tract to expand it. It can make you burp.  · During the procedure, the healthcare provider can take biopsies (tissue samples), remove abnormalities, such as polyps, or treat abnormalities through a variety of devices placed through the endoscope. You will not feel this.   · The endoscope carries images of your upper GI tract to a video screen. If you are awake, you may be able to look at the images.  · After the procedure is done, you will rest for a time. An adult must drive you home.  When to call your healthcare provider  Contact your healthcare provider if you have:  · Black or tarry stools, or blood in your stool  · Fever  · Pain in your belly that does not go away  · Nausea and vomiting, or vomiting blood   Date Last Reviewed: 7/1/2016  © 4780-4550 [employer]. [address] [place] [address], [facility] [place]  "[address]. All rights reserved. This information is not intended as a substitute for professional medical care. Always follow your healthcare professional's instructions.            Admission Information     Date & Time Provider Department CSN    4/10/2017  6:33 AM Christian Santiago MD [facility] [id number]      Care Providers     Provider Role Specialty Primary office phone    Christian Santiago MD Attending Provider Gastroenterology [phone]    Christian Santiago MD Surgeon  Gastroenterology [phone]      Your Vitals Were     BP Pulse Temp Resp Height Weight    94/58 (BP Location: Left arm, Patient Position: Lying, BP Method: Automatic) 58 97.6 °F (36.4 °C) (Oral) 18 5' 8" (1.727 m) 79.4 kg (175 lb)    SpO2 BMI             96% 26.61 kg/m2         Recent Lab Values        7/26/2016                          10:18 AM           A1C 5.4           Comment for A1C at 10:18 AM on 7/26/2016:  According to ADA guidelines, hemoglobin A1C <7.0% represents  optimal control in non-pregnant diabetic patients.  Different  metrics may apply to specific populations.   Standards of Medical Care in Diabetes - 2016.  For the purpose of screening for the presence of diabetes:  <5.7%     Consistent with the absence of diabetes  5.7-6.4%  Consistent with increasing risk for diabetes   (prediabetes)  >or=6.5%  Consistent with diabetes  Currently no consensus exists for use of hemoglobin A1C  for diagnosis of diabetes for children.        Allergies as of 4/10/2017        Reactions    No Known Drug Allergies       Advance Directives     An advance directive is a document which, in the event you are no longer able to make decisions for yourself, tells your healthcare team what kind of treatment you do or do not want to receive, or who you would like to make those decisions for you.  If you do not currently have an advance directive, [facility] encourages you to create one.  For more information call:  [phone] " [phone]), [phone] ([phone]),  or log on to [email or web address].        Smoking Cessation     If you would like to quit smoking:   You may be eligible for free services if you are a [place] resident and started smoking cigarettes before September 1, 1988.  Call the Smoking Cessation Trust (SCT) toll free at (777) 764-4331 or (747) 323-6773.   Call 1-800-QUIT-NOW if you do not meet the above criteria.   Contact us via email: [email or web address]   View our website for more information: [email or web address]        Language Assistance Services     ATTENTION: Language assistance services are available, free of charge. Please call [phone].      ATENCIÓN: Si habla abel, tiene a posey disposición servicios gratuitos de asistencia lingüística. Llame al [phone].     CHÚ Ý: N?u b?n nói Ti?ng Vi?t, có các d?ch v? h? tr? ngôn ng? mi?n phí dành cho b?n. G?i s? [phone].         [facility] complies with applicable Federal civil rights laws and does not discriminate on the basis of race, color, national origin, age, disability, or sex.

## 2017-04-10 NOTE — TRANSFER OF CARE
"Anesthesia Transfer of Care Note    Patient: Ang Oden Jr.    Procedure(s) Performed: Procedure(s) (LRB):  ESOPHAGOGASTRODUODENOSCOPY (EGD) (N/A)    Patient location: PACU    Anesthesia Type: general    Transport from OR: Transported from OR on room air with adequate spontaneous ventilation    Post pain: adequate analgesia    Post assessment: no apparent anesthetic complications and tolerated procedure well    Post vital signs: stable    Level of consciousness: awake    Nausea/Vomiting: no nausea/vomiting    Complications: none          Last vitals:   Visit Vitals    /73 (BP Location: Left arm, Patient Position: Lying, BP Method: Automatic)    Pulse 85    Temp 36.3 °C (97.3 °F) (Oral)    Resp 11    Ht 5' 8" (1.727 m)    Wt 79.4 kg (175 lb)    SpO2 99%    BMI 26.61 kg/m2     "

## 2017-04-10 NOTE — ANESTHESIA PREPROCEDURE EVALUATION
04/10/2017  Ang Oden Jr. is a 61 y.o., male.    [facility] Anesthesia Evaluation    I have reviewed the Patient Summary Reports.    I have reviewed the Nursing Notes.      Review of Systems  Anesthesia Hx:  No problems with previous Anesthesia    Cardiovascular:   Hypertension, well controlled    Pulmonary:   Sleep Apnea, CPAP        Physical Exam  General:  Well nourished                 Anesthesia Plan  Type of Anesthesia, risks & benefits discussed:  Anesthesia Type:  general  Patient's Preference:   Intra-op Monitoring Plan:   Intra-op Monitoring Plan Comments:   Post Op Pain Control Plan:   Post Op Pain Control Plan Comments:   Induction:   IV  Beta Blocker:  Patient is not currently on a Beta-Blocker (No further documentation required).       Informed Consent: Patient understands risks and agrees with Anesthesia plan.  Questions answered. Anesthesia consent signed with patient.  ASA Score: 2     Day of Surgery Review of History & Physical:    H&P update referred to the surgeon.         Ready For Surgery From Anesthesia Perspective.

## 2017-04-10 NOTE — ANESTHESIA POSTPROCEDURE EVALUATION
"Anesthesia Post Evaluation    Patient: Ang Oden Jr.    Procedure(s) Performed: Procedure(s) (LRB):  ESOPHAGOGASTRODUODENOSCOPY (EGD) (N/A)    Final Anesthesia Type: general  Patient location during evaluation: PACU  Patient participation: Yes- Able to Participate  Level of consciousness: awake and alert  Post-procedure vital signs: reviewed and stable  Pain management: adequate  Airway patency: positional obstruction  PONV status at discharge: No PONV  Anesthetic complications: no      Cardiovascular status: blood pressure returned to baseline and hemodynamically stable  Respiratory status: unassisted  Hydration status: euvolemic  Follow-up not needed.        Visit Vitals    BP 93/62    Pulse (!) 59    Temp 36.3 °C (97.3 °F) (Oral)    Resp 15    Ht 5' 8" (1.727 m)    Wt 79.4 kg (175 lb)    SpO2 100%    BMI 26.61 kg/m2       Pain/Veronica Score: Pain Assessment Performed: Yes (4/10/2017  7:45 AM)  Presence of Pain: denies (4/10/2017  7:08 AM)      "

## 2017-04-10 NOTE — DISCHARGE INSTRUCTIONS
Esophageal Dilation     A balloon dilator may be used to widen a stricture in the esophagus.   An esophageal dilation is a procedure used to widen a narrowed section of your esophagus. This is the tube that leads from your throat to your stomach. Narrowing (stricture) of the esophagus can cause problems. These include trouble swallowing. This sheet explains what to expect with esophageal dilation.  Why esophageal dilation is needed  Several problems can be treated with esophageal dilation. They include:  · Peptic stricture. This is caused by reflux esophagitis. With this problem, the esophagus is irritated by acid reflux (heartburn). This occurs when acid from your stomach flows back up into the esophagus. Stomach acid damages the lining of the esophagus. This leads to a buildup of scar tissue. As a result, the esophagus is narrowed.  · Schatzkis ring. This is an abnormal ring of tissue. It forms where the esophagus meets the stomach. It can cause trouble swallowing. It can also cause food to get stuck in the esophagus. The cause of this condition is not known.  · Achalasia. This condition stops food and liquids from moving into your stomach from the esophagus. It affects the lower esophageal sphincter (LES). The LES is a muscular ring that opens (relaxes) when you swallow. With achalasia, the LES does not relax. This condition can also cause problems with peristalsis. This is the normal muscular action of the esophagus that moves food into the stomach.  · Eosinophilic esophagitis. This is a redness and swelling (inflammation) in the esophagus. It is caused by an environmental trigger such as a food allergy. It can lead to pain, trouble swallowing, and strictures.  · Less common causes of stricture. Other causes of stricture include radiation treatment and cancer.  Before you have esophageal dilation  · Tell your provider about any medicines you take. This includes prescription medicines, over-the-counter  medicines, herbs, vitamins, and other supplements. Be sure to mention aspirin or any blood thinners youre taking.  · Let your provider know if you need to take antibiotics before dental procedures. You may need to take them before esophageal dilation as well.  · Tell your provider about any health conditions you have, such as heart or lung disease. Also mention any allergies to medicines.  · Youll need to have an empty stomach for the procedure. Follow your providers instructions for not eating and drinking before the procedure.  · Arrange to have a family member or friend drive you home after the procedure.  During the procedure  · You may be given local anesthesia to numb your throat. Youll also likely be given medicine to relax you. The procedure takes about 15 minutes. It does not cause trouble breathing.  · A tube called an endoscope (scope) is used. This is a narrow tube with a tiny light and camera at the end. The scope is inserted through your mouth and into your esophagus. It lets your provider see inside your esophagus. To help guide your provider, an imaging method called fluoroscopy may also be used. This creates a moving X-ray image on a computer screen.   · Next, special tiny tools are carefully guided through your mouth and down into the esophagus. They widen the stricture and are then removed. Different types of instruments are used. The type used depends on the size and cause of the stricture. Types include:  ¨ Balloon dilator. A tiny empty balloon is put into the stricture using an endoscope. The balloon is slowly filled with air. The air is removed from the balloon when the stricture is widened to the right size. Balloon dilators are used to treat many types of strictures.  ¨ Guided wire dilator. A thin wire is eased down the esophagus. A small tube thats wider on one end is guided down the wire. It is put into the stricture to stretch it. These dilators are used to treat all kinds of  strictures.  ¨ Bougies. These are weighted, cone-shaped tubes. Starting with smaller cones, your provider uses increasingly larger cones until the stricture is stretched the right amount. Bougies are often used to treat strictures that are simple (short, straight, and not very narrow).  After the procedure  · Youll be watched closely until your provider says youre ready to go home. Youll need to have a friend or family member drive you home.  · You may have a sore throat for the rest of the day.  · You may have pain behind your breastbone for a short time afterwards.  · You can start drinking fluids again after the numbness in your throat goes away. You can resume eating the same day or the next day.  Risks and possible complications  Risks and possible complications for esophageal dilation include:  · Infection  · A tear or hole in the esophagus lining, causing bleeding and possibly needing surgery to fix  · Risks of anesthesia  Follow-up  You may need to have the procedure repeated one or more times. This depends on the cause and extent of the narrowing. Repeat procedures can allow the dilation to take place more slowly. This reduces the risks of the procedure.  If your stricture was caused by reflux esophagitis, youll likely need to take medicine to treat that condition. Your provider will tell you more.  When to call your provider  Call your healthcare provider right away if you have any of the following after the procedure:  · Fever of 100.4°F (38.0°C)  · Chest pain  · Trouble swallowing  · Vomiting blood or material that looks like coffee grounds  · Bleeding  · Black, tarry, or bloody stools   Date Last Reviewed: 7/1/2016 © 2000-2016 [employer]. [address] [place] [address], [place] [address]. All rights reserved. This information is not intended as a substitute for professional medical care. Always follow your healthcare professional's instructions.        What Is a Hiatal Hernia?    Hiatal  hernia is when the area where the stomach and esophagus meet bulges up through the diaphragm into the chest cavity. In some cases, part of the stomach may bulge above the diaphragm. Stomach acid may move up into the esophagus and cause symptoms. The symptoms are often blamed on gastroesophageal reflux disease (GERD). You may only know about the hernia when it shows up on an X-ray taken for other reasons.   What you may feel  The hiatus is a normal hole in the diaphragm. The esophagus passes through this hole and leads to the stomach. In some cases, part of the stomach may bulge above the diaphragm. This bulge is called a hernia. Stomach acid may move up into the esophagus and cause symptoms.  When you eat, the muscle at the hiatus relaxes to allow food to pass into the stomach. It tightens again to keep food and digestive acids in the stomach.  Many people with hiatal hernias have mild symptoms. You may notice the following GERD symptoms:  · Heartburn or other chest discomfort  · A feeling of chest fullness after a meal  · Frequent burping  · Acid taste in the mouth  · Trouble swallowing  Treating symptoms  If you have been diagnosed with hiatal hernia, these suggestions may help improve symptoms:  · Lose excess weight. Extra weight puts pressure on the stomach and esophagus.  · Dont lie down after eating. Sit up for at least an hour after eating. Lying down after eating can increase symptoms.  · Avoid certain foods and drinks. These include fatty foods, chocolate, coffee, mint, and other foods that cause symptoms for you.  · Dont smoke or drink alcohol. These can worsen symptoms.  · Look at your medicines. Discuss your medicines with your healthcare provider. Many medicines can cause symptoms.  · Consider an antacid medicine. Ask your healthcare provider about over-the-counter and prescription medicines that may help.  · Ask about surgery, if needed. Surgery is a treatment choice for some people. Your healthcare  provider can determine if surgery is an option for you.    Date Last Reviewed: 10/1/2016  © 7342-9070 [employer]. [address] [place] [address], [place] [address]. All rights reserved. This information is not intended as a substitute for professional medical care. Always follow your healthcare professional's instructions.        Gastritis (Adult)    Gastritis is inflammation and irritation of the stomach lining. It can be present for a short time (acute) or be long lasting (chronic). Gastritis is often caused by infection with bacteria called H pylori. More than a third of people in the [place] have this bacteria in their bodies. In many cases, H pylori causes no problems or symptoms. In some people, though, the infection irritates the stomach lining and causes gastritis. Other causes of stomach irritation include drinking alcohol or taking pain-relieving medicines called NSAIDs (such as aspirin or ibuprofen).   Symptoms of gastritis can include:  · Abdominal pain or bloating  · Loss of appetite  · Nausea or vomiting  · Vomiting blood or having black stools  · Feeling more tired than usual  An inflamed and irritated stomach lining is more likely to develop a sore called an ulcer. To help prevent this, gastritis should be treated.  Home care  If needed, medicines may be prescribed. If you have H pylori infection, treating it will likely relieve your symptoms. Other changes can help reduce stomach irritation and help it heal.  · If you have been prescribed medicines for H pylori infection, take them as directed. Take all of the medicine until it is finished or your healthcare provider tells you to stop, even if you feel better.  · Your healthcare provider may recommend avoiding NSAIDs. If you take daily aspirin for your heart or other medical reasons, do not stop without talking to your healthcare provider first.  · Avoid drinking alcohol.  · Stop smoking. Smoking can irritate the stomach and delay healing. As much  as possible, stay away from second hand smoke.  Follow-up care  Follow up with your healthcare provider, or as advised by our staff. Testing may be needed to check for inflammation or an ulcer.  When to seek medical advice  Call your healthcare provider for any of the following:  · Stomach pain that gets worse or moves to the lower right abdomen (appendix area)  · Chest pain that appears or gets worse, or spreads to the back, neck, shoulder, or arm  · Frequent vomiting (cant keep down liquids)  · Blood in the stool or vomit (red or black in color)  · Feeling weak or dizzy  · Fever of 100.4ºF (38ºC) or higher, or as directed by your healthcare provider  Date Last Reviewed: 6/22/2015 © 2000-2016 [employer]. [address] [place] [address], [place] [address]. All rights reserved. This information is not intended as a substitute for professional medical care. Always follow your healthcare professional's instructions.        Upper GI Endoscopy     During endoscopy, a long, flexible tube is used to view the inside of your upper GI tract.      Upper GI endoscopy allows your healthcare provider to look directly into the beginning of your gastrointestinal (GI) tract. The esophagus, stomach, and duodenum (the first part of the small intestine) make up the upper GI tract.   Before the exam  Follow these and any other instructions you are given before your endoscopy. If you dont follow the healthcare providers instructions carefully, the test may need to be canceled or done over:  · Don't eat or drink anything after midnight the night before your exam. If your exam is in the afternoon, drink only clear liquids in the morning. Don't eat or drink anything for 8 hours before the exam. In some cases, you may be able to take medicines with sips of water until 2 hours before the procedure. Speak with your healthcare provider about this.   · Bring your X-rays and any other test results you have.  · Because you will be  sedated, arrange for an adult to drive you home after the exam.  · Tell your healthcare provider before the exam if you are taking any medicines or have any medical problems.  The procedure  Here is what to expect:  · You will lie on the endoscopy table. Usually patients lie on the left side.  · You will be monitored and given oxygen.  · Your throat may be numbed with a spray or gargle. You are given medicine through an intravenous (IV) line that will help you relax and remain comfortable. You may be awake or asleep during the procedure.  · The healthcare provider will put the endoscope in your mouth and down your esophagus. It is thinner than most pieces of food that you swallow. It will not affect your breathing. The medicine helps keep you from gagging.  · Air is put into your GI tract to expand it. It can make you burp.  · During the procedure, the healthcare provider can take biopsies (tissue samples), remove abnormalities, such as polyps, or treat abnormalities through a variety of devices placed through the endoscope. You will not feel this.   · The endoscope carries images of your upper GI tract to a video screen. If you are awake, you may be able to look at the images.  · After the procedure is done, you will rest for a time. An adult must drive you home.  When to call your healthcare provider  Contact your healthcare provider if you have:  · Black or tarry stools, or blood in your stool  · Fever  · Pain in your belly that does not go away  · Nausea and vomiting, or vomiting blood   Date Last Reviewed: 7/1/2016  © 2332-1839 [employer]. [address] [place] [address], [place] [address]. All rights reserved. This information is not intended as a substitute for professional medical care. Always follow your healthcare professional's instructions.

## 2017-04-11 VITALS
OXYGEN SATURATION: 97 % | BODY MASS INDEX: 26.52 KG/M2 | HEART RATE: 55 BPM | RESPIRATION RATE: 18 BRPM | SYSTOLIC BLOOD PRESSURE: 98 MMHG | HEIGHT: 68 IN | DIASTOLIC BLOOD PRESSURE: 55 MMHG | WEIGHT: 175 LBS | TEMPERATURE: 98 F

## 2017-04-26 ENCOUNTER — PATIENT MESSAGE (OUTPATIENT)
Dept: GASTROENTEROLOGY | Facility: CLINIC | Age: 62
End: 2017-04-26

## 2017-04-27 DIAGNOSIS — K21.9 GASTROESOPHAGEAL REFLUX DISEASE, ESOPHAGITIS PRESENCE NOT SPECIFIED: ICD-10-CM

## 2017-04-28 RX ORDER — OMEPRAZOLE 20 MG/1
20 CAPSULE, DELAYED RELEASE ORAL DAILY
Qty: 90 CAPSULE | Refills: 3 | Status: SHIPPED | OUTPATIENT
Start: 2017-04-28 | End: 2018-04-30 | Stop reason: SDUPTHER

## 2017-05-02 ENCOUNTER — TELEPHONE (OUTPATIENT)
Dept: GASTROENTEROLOGY | Facility: CLINIC | Age: 62
End: 2017-05-02

## 2017-09-05 ENCOUNTER — DOCUMENTATION ONLY (OUTPATIENT)
Dept: FAMILY MEDICINE | Facility: CLINIC | Age: 62
End: 2017-09-05

## 2017-09-05 NOTE — PROGRESS NOTES
Pre-Visit Chart Review  For Appointment Scheduled on 9-6-17    Health Maintenance Due   Topic Date Due    TETANUS VACCINE  11/28/1972    Zoster Vaccine  11/28/2014    Colonoscopy  02/01/2016    Influenza Vaccine  08/01/2017

## 2017-09-06 ENCOUNTER — OFFICE VISIT (OUTPATIENT)
Dept: FAMILY MEDICINE | Facility: CLINIC | Age: 62
End: 2017-09-06
Payer: COMMERCIAL

## 2017-09-06 VITALS
WEIGHT: 185.19 LBS | HEART RATE: 55 BPM | HEIGHT: 68 IN | TEMPERATURE: 98 F | BODY MASS INDEX: 28.07 KG/M2 | SYSTOLIC BLOOD PRESSURE: 113 MMHG | DIASTOLIC BLOOD PRESSURE: 71 MMHG

## 2017-09-06 DIAGNOSIS — G47.33 OSA ON CPAP: Primary | ICD-10-CM

## 2017-09-06 DIAGNOSIS — M25.539 CHRONIC WRIST PAIN, UNSPECIFIED LATERALITY: ICD-10-CM

## 2017-09-06 DIAGNOSIS — E78.5 HYPERLIPIDEMIA, UNSPECIFIED HYPERLIPIDEMIA TYPE: ICD-10-CM

## 2017-09-06 DIAGNOSIS — J30.1 ACUTE SEASONAL ALLERGIC RHINITIS DUE TO POLLEN: ICD-10-CM

## 2017-09-06 DIAGNOSIS — I10 ESSENTIAL HYPERTENSION: ICD-10-CM

## 2017-09-06 DIAGNOSIS — M10.9 GOUT, ARTHRITIS: ICD-10-CM

## 2017-09-06 DIAGNOSIS — D64.9 ANEMIA, UNSPECIFIED TYPE: ICD-10-CM

## 2017-09-06 DIAGNOSIS — K21.9 GASTROESOPHAGEAL REFLUX DISEASE, ESOPHAGITIS PRESENCE NOT SPECIFIED: ICD-10-CM

## 2017-09-06 DIAGNOSIS — G89.29 CHRONIC WRIST PAIN, UNSPECIFIED LATERALITY: ICD-10-CM

## 2017-09-06 PROCEDURE — 99214 OFFICE O/P EST MOD 30 MIN: CPT | Mod: S$GLB,,, | Performed by: FAMILY MEDICINE

## 2017-09-06 PROCEDURE — 99999 PR PBB SHADOW E&M-EST. PATIENT-LVL III: CPT | Mod: PBBFAC,,, | Performed by: FAMILY MEDICINE

## 2017-09-06 PROCEDURE — 3008F BODY MASS INDEX DOCD: CPT | Mod: S$GLB,,, | Performed by: FAMILY MEDICINE

## 2017-09-06 PROCEDURE — 3078F DIAST BP <80 MM HG: CPT | Mod: S$GLB,,, | Performed by: FAMILY MEDICINE

## 2017-09-06 PROCEDURE — 3074F SYST BP LT 130 MM HG: CPT | Mod: S$GLB,,, | Performed by: FAMILY MEDICINE

## 2017-09-06 RX ORDER — LORATADINE 10 MG/1
10 TABLET ORAL DAILY
COMMUNITY
End: 2017-12-22

## 2017-09-06 RX ORDER — MONTELUKAST SODIUM 10 MG/1
10 TABLET ORAL NIGHTLY
Qty: 30 TABLET | Refills: 11 | Status: SHIPPED | OUTPATIENT
Start: 2017-09-06 | End: 2017-10-06

## 2017-09-06 RX ORDER — NEBIVOLOL 5 MG/1
5 TABLET ORAL DAILY
Qty: 30 TABLET | Refills: 5 | Status: SHIPPED | OUTPATIENT
Start: 2017-09-06 | End: 2017-10-26 | Stop reason: SDUPTHER

## 2017-09-06 RX ORDER — MELOXICAM 7.5 MG/1
7.5 TABLET ORAL DAILY
Qty: 90 TABLET | Refills: 1 | Status: SHIPPED | OUTPATIENT
Start: 2017-09-06 | End: 2018-03-24 | Stop reason: SDUPTHER

## 2017-09-06 NOTE — PROGRESS NOTES
Subjective:       Patient ID: Ang Oden Jr. is a 61 y.o. male.    Chief Complaint: Follow-up    Patient Active Problem List   Diagnosis    Olecranon bursitis    Gout, arthritis    Hyperlipidemia    Anemia, Hgb 13.1    Lumbago due to displacement of intervertebral disc    Male hypogonadism    Alcohol abuse with intoxication, daily    Alcohol induced fatty liver    KYLEIGH (obstructive sleep apnea), not using CPAP    Kidney calculi    BPH (benign prostatic hypertrophy)    Diverticulosis    Cardiovascular risk factor, FCVRS 5% on Zocor    Insomnia    Anxiety    Allergic rhinitis    Essential hypertension    History of alcohol abuse    Dysphagia   egd 4/17-schatzki ring dilated, gastric polyp-neg for dysplasia    Mood-stopped anti depressants a month agp    KYLEIGH on cpap-restarted x 3 days.  Takes ambien to be able to tolerate CPAP    H/o alcohol abuse- no drink x 1 year  HPI  Review of Systems   Constitutional: Negative for fatigue and unexpected weight change.   Respiratory: Negative for chest tightness and shortness of breath.    Cardiovascular: Negative for chest pain, palpitations and leg swelling.   Gastrointestinal: Negative for abdominal pain.   Musculoskeletal: Negative for arthralgias.   Neurological: Negative for dizziness, syncope, light-headedness and headaches.   Psychiatric/Behavioral: Negative for agitation and dysphoric mood. The patient is not nervous/anxious.        Objective:      Physical Exam   Constitutional: He is oriented to person, place, and time. He appears well-developed and well-nourished.   Cardiovascular: Normal rate, regular rhythm and normal heart sounds.    Pulmonary/Chest: Effort normal and breath sounds normal.   Musculoskeletal: He exhibits no edema.   Neurological: He is alert and oriented to person, place, and time.   Skin: Skin is warm and dry.   Psychiatric: He has a normal mood and affect.   Nursing note and vitals reviewed.      Assessment:       1. KYLEIGH on  CPAP    2. Essential hypertension    3. Acute seasonal allergic rhinitis due to pollen    4. Anemia, unspecified type    5. Gout, arthritis    6. Hyperlipidemia, unspecified hyperlipidemia type    7. Chronic wrist pain, unspecified laterality    8. Gastroesophageal reflux disease, esophagitis presence not specified        Plan:       1. KYLEIGH on CPAP  Refer for sleep study for new home machine and supplies-old  - Ambulatory consult for Sleep Study    2. Essential hypertension  Decrease to:  - nebivolol (BYSTOLIC) 5 MG Tab; Take 1 tablet (5 mg total) by mouth once daily.  Dispense: 30 tablet; Refill: 5    3. Acute seasonal allergic rhinitis due to pollen  Recommend otc non-sedating antihistamine such as Loratadine and/or steroid nasal spray such as Flonase as directed and as needed.  Please return to clinic if symptoms persist after these interventions.    - montelukast (SINGULAIR) 10 mg tablet; Take 1 tablet (10 mg total) by mouth every evening.  Dispense: 30 tablet; Refill: 11    4. Anemia, unspecified type  Screen and treat as indicated:      5. Gout, arthritis  Screen and treat as indicated:    - Comprehensive metabolic panel; Future  - Uric acid; Future    6. Hyperlipidemia, unspecified hyperlipidemia type  Stable condition.  Continue current medications.  Will adjust based on lab findings or if condition changes.    - CBC auto differential; Future  - Lipid panel; Future    7. Chronic wrist pain, unspecified laterality  Add:  - meloxicam (MOBIC) 7.5 MG tablet; Take 1 tablet (7.5 mg total) by mouth once daily.  Dispense: 90 tablet; Refill: 1    8. Gastroesophageal reflux disease, esophagitis presence not specified  Counseled patient on prevention of reflux with changes in diet and behavior.  I recommended avoidance of greasy and spicy foods, caffeine and eating within 3 hours of bedtime.  I counseled the patient to avoid eating large meals and instead eating more frequent small meals.  I also recommended weight  loss and elevation of the head of the bed by 6 inches.  If symptoms persist after these changes medication may be needed to control GERD.    Trial off PPI-doing well      [facility] goal documentation:  Patient readiness: acceptance and barriers:readiness  During the course of the visit the patient was educated and counseled about the following: Hypertension:   Dietary sodium restriction.  Regular aerobic exercise.  Goals: Hypertension: Reduce Blood Pressure  Goal/Outcomes met:Hypertension  The following self management tools provided:none  Patient Instructions (the written plan) was given to the patient/family: Yes  Time spent with patient: 20 minutes    Patient with be reevaluated in 3 months or sooner prn    Greater than 50% of this visit was spent counseling as described in above documentation:Yes

## 2017-10-26 DIAGNOSIS — J30.9 ALLERGIC RHINITIS, UNSPECIFIED CHRONICITY, UNSPECIFIED SEASONALITY, UNSPECIFIED TRIGGER: Primary | ICD-10-CM

## 2017-10-26 DIAGNOSIS — I10 ESSENTIAL HYPERTENSION: ICD-10-CM

## 2017-10-27 RX ORDER — NEBIVOLOL 5 MG/1
5 TABLET ORAL DAILY
Qty: 90 TABLET | Refills: 1 | Status: SHIPPED | OUTPATIENT
Start: 2017-10-27 | End: 2018-04-23 | Stop reason: SDUPTHER

## 2017-10-27 RX ORDER — MONTELUKAST SODIUM 10 MG/1
10 TABLET ORAL NIGHTLY
Qty: 90 TABLET | Refills: 3 | Status: SHIPPED | OUTPATIENT
Start: 2017-10-27 | End: 2018-12-10 | Stop reason: SDUPTHER

## 2017-11-09 RX ORDER — BUPROPION HYDROCHLORIDE 150 MG/1
TABLET ORAL
Qty: 30 TABLET | Refills: 5 | Status: SHIPPED | OUTPATIENT
Start: 2017-11-09 | End: 2017-12-22

## 2017-12-15 ENCOUNTER — LAB VISIT (OUTPATIENT)
Dept: LAB | Facility: HOSPITAL | Age: 62
End: 2017-12-15
Attending: FAMILY MEDICINE
Payer: COMMERCIAL

## 2017-12-15 DIAGNOSIS — M10.9 GOUT, ARTHRITIS: ICD-10-CM

## 2017-12-15 DIAGNOSIS — E78.5 HYPERLIPIDEMIA, UNSPECIFIED HYPERLIPIDEMIA TYPE: ICD-10-CM

## 2017-12-15 LAB
ALBUMIN SERPL BCP-MCNC: 3.7 G/DL
ALP SERPL-CCNC: 52 U/L
ALT SERPL W/O P-5'-P-CCNC: 17 U/L
ANION GAP SERPL CALC-SCNC: 8 MMOL/L
AST SERPL-CCNC: 19 U/L
BASOPHILS # BLD AUTO: 0 K/UL
BASOPHILS NFR BLD: 0.3 %
BILIRUB SERPL-MCNC: 1 MG/DL
BUN SERPL-MCNC: 13 MG/DL
CALCIUM SERPL-MCNC: 9.1 MG/DL
CHLORIDE SERPL-SCNC: 107 MMOL/L
CHOLEST SERPL-MCNC: 155 MG/DL
CHOLEST/HDLC SERPL: 3.3 {RATIO}
CO2 SERPL-SCNC: 26 MMOL/L
CREAT SERPL-MCNC: 0.8 MG/DL
DIFFERENTIAL METHOD: ABNORMAL
EOSINOPHIL # BLD AUTO: 0.2 K/UL
EOSINOPHIL NFR BLD: 2.3 %
ERYTHROCYTE [DISTWIDTH] IN BLOOD BY AUTOMATED COUNT: 14.6 %
EST. GFR  (AFRICAN AMERICAN): >60 ML/MIN/1.73 M^2
EST. GFR  (NON AFRICAN AMERICAN): >60 ML/MIN/1.73 M^2
GLUCOSE SERPL-MCNC: 81 MG/DL
HCT VFR BLD AUTO: 38.7 %
HDLC SERPL-MCNC: 47 MG/DL
HDLC SERPL: 30.3 %
HGB BLD-MCNC: 13.1 G/DL
LDLC SERPL CALC-MCNC: 96.6 MG/DL
LYMPHOCYTES # BLD AUTO: 2.6 K/UL
LYMPHOCYTES NFR BLD: 34.6 %
MCH RBC QN AUTO: 31 PG
MCHC RBC AUTO-ENTMCNC: 33.9 G/DL
MCV RBC AUTO: 92 FL
MONOCYTES # BLD AUTO: 0.7 K/UL
MONOCYTES NFR BLD: 9.8 %
NEUTROPHILS # BLD AUTO: 4 K/UL
NEUTROPHILS NFR BLD: 53 %
NONHDLC SERPL-MCNC: 108 MG/DL
PLATELET # BLD AUTO: 197 K/UL
PMV BLD AUTO: 8.1 FL
POTASSIUM SERPL-SCNC: 3.7 MMOL/L
PROT SERPL-MCNC: 6.9 G/DL
RBC # BLD AUTO: 4.22 M/UL
SODIUM SERPL-SCNC: 141 MMOL/L
TRIGL SERPL-MCNC: 57 MG/DL
URATE SERPL-MCNC: 5.2 MG/DL
WBC # BLD AUTO: 7.6 K/UL

## 2017-12-15 PROCEDURE — 85025 COMPLETE CBC W/AUTO DIFF WBC: CPT

## 2017-12-15 PROCEDURE — 84550 ASSAY OF BLOOD/URIC ACID: CPT

## 2017-12-15 PROCEDURE — 80053 COMPREHEN METABOLIC PANEL: CPT

## 2017-12-15 PROCEDURE — 36415 COLL VENOUS BLD VENIPUNCTURE: CPT

## 2017-12-15 PROCEDURE — 80061 LIPID PANEL: CPT

## 2017-12-21 ENCOUNTER — DOCUMENTATION ONLY (OUTPATIENT)
Dept: FAMILY MEDICINE | Facility: CLINIC | Age: 62
End: 2017-12-21

## 2017-12-21 NOTE — PROGRESS NOTES
Pre-Visit Chart Review  For Appointment Scheduled on 12-22-17    Health Maintenance Due   Topic Date Due    TETANUS VACCINE  11/28/1972    Zoster Vaccine  11/28/2014    Colonoscopy  03/07/2016    Influenza Vaccine  08/01/2017

## 2017-12-22 ENCOUNTER — OFFICE VISIT (OUTPATIENT)
Dept: FAMILY MEDICINE | Facility: CLINIC | Age: 62
End: 2017-12-22
Payer: COMMERCIAL

## 2017-12-22 VITALS
DIASTOLIC BLOOD PRESSURE: 73 MMHG | WEIGHT: 189.38 LBS | HEIGHT: 68 IN | HEART RATE: 56 BPM | BODY MASS INDEX: 28.7 KG/M2 | TEMPERATURE: 98 F | SYSTOLIC BLOOD PRESSURE: 110 MMHG

## 2017-12-22 DIAGNOSIS — M10.9 GOUT, ARTHRITIS: ICD-10-CM

## 2017-12-22 DIAGNOSIS — D64.9 ANEMIA, UNSPECIFIED TYPE: ICD-10-CM

## 2017-12-22 DIAGNOSIS — M10.9 ACUTE GOUT OF FOOT, UNSPECIFIED CAUSE, UNSPECIFIED LATERALITY: ICD-10-CM

## 2017-12-22 DIAGNOSIS — E78.5 HYPERLIPIDEMIA, UNSPECIFIED HYPERLIPIDEMIA TYPE: ICD-10-CM

## 2017-12-22 DIAGNOSIS — I10 ESSENTIAL HYPERTENSION: ICD-10-CM

## 2017-12-22 DIAGNOSIS — G47.33 OSA ON CPAP: Primary | ICD-10-CM

## 2017-12-22 DIAGNOSIS — G47.00 INSOMNIA, UNSPECIFIED TYPE: ICD-10-CM

## 2017-12-22 PROCEDURE — 99214 OFFICE O/P EST MOD 30 MIN: CPT | Mod: S$GLB,,, | Performed by: FAMILY MEDICINE

## 2017-12-22 PROCEDURE — 99999 PR PBB SHADOW E&M-EST. PATIENT-LVL III: CPT | Mod: PBBFAC,,, | Performed by: FAMILY MEDICINE

## 2017-12-22 RX ORDER — CITALOPRAM 20 MG/1
20 TABLET, FILM COATED ORAL DAILY
Qty: 90 TABLET | Refills: 3 | Status: SHIPPED | OUTPATIENT
Start: 2017-12-22 | End: 2018-12-10

## 2017-12-22 RX ORDER — ZOLPIDEM TARTRATE 10 MG/1
10 TABLET ORAL NIGHTLY PRN
Qty: 30 TABLET | Refills: 2 | Status: SHIPPED | OUTPATIENT
Start: 2017-12-22 | End: 2018-12-10 | Stop reason: SDUPTHER

## 2017-12-22 RX ORDER — ALLOPURINOL 300 MG/1
300 TABLET ORAL DAILY
Qty: 90 TABLET | Refills: 3 | Status: SHIPPED | OUTPATIENT
Start: 2017-12-22 | End: 2018-06-19 | Stop reason: SDUPTHER

## 2017-12-22 RX ORDER — FLUTICASONE PROPIONATE 50 MCG
1 SPRAY, SUSPENSION (ML) NASAL DAILY
Qty: 3 BOTTLE | Refills: 3 | Status: SHIPPED | OUTPATIENT
Start: 2017-12-22 | End: 2018-12-11 | Stop reason: SDUPTHER

## 2017-12-22 RX ORDER — AMLODIPINE AND BENAZEPRIL HYDROCHLORIDE 5; 20 MG/1; MG/1
1 CAPSULE ORAL DAILY
Qty: 90 CAPSULE | Refills: 3 | Status: SHIPPED | OUTPATIENT
Start: 2017-12-22 | End: 2018-06-19 | Stop reason: SDUPTHER

## 2017-12-22 RX ORDER — CITALOPRAM 20 MG/1
20 TABLET, FILM COATED ORAL DAILY
COMMUNITY
End: 2017-12-22 | Stop reason: SDUPTHER

## 2017-12-22 RX ORDER — SIMVASTATIN 10 MG/1
10 TABLET, FILM COATED ORAL NIGHTLY
Qty: 90 TABLET | Refills: 3 | Status: SHIPPED | OUTPATIENT
Start: 2017-12-22 | End: 2018-06-19 | Stop reason: SDUPTHER

## 2017-12-23 ENCOUNTER — PATIENT MESSAGE (OUTPATIENT)
Dept: FAMILY MEDICINE | Facility: CLINIC | Age: 62
End: 2017-12-23

## 2017-12-27 DIAGNOSIS — M10.9 ACUTE GOUT OF FOOT, UNSPECIFIED CAUSE, UNSPECIFIED LATERALITY: ICD-10-CM

## 2017-12-27 DIAGNOSIS — I10 ESSENTIAL HYPERTENSION: ICD-10-CM

## 2017-12-27 RX ORDER — SIMVASTATIN 10 MG/1
10 TABLET, FILM COATED ORAL NIGHTLY
Qty: 90 TABLET | Refills: 0 | Status: SHIPPED | OUTPATIENT
Start: 2017-12-27 | End: 2018-12-10 | Stop reason: SDUPTHER

## 2017-12-27 RX ORDER — AMLODIPINE AND BENAZEPRIL HYDROCHLORIDE 5; 20 MG/1; MG/1
1 CAPSULE ORAL DAILY
Qty: 90 CAPSULE | Refills: 0 | Status: SHIPPED | OUTPATIENT
Start: 2017-12-27 | End: 2018-12-10

## 2017-12-27 RX ORDER — ALLOPURINOL 300 MG/1
300 TABLET ORAL DAILY
Qty: 90 TABLET | Refills: 0 | Status: SHIPPED | OUTPATIENT
Start: 2017-12-27 | End: 2018-06-19

## 2018-01-01 NOTE — PROGRESS NOTES
Subjective:       Patient ID: Ang Oden Jr. is a 62 y.o. male.    Chief Complaint: Follow-up    Patient Active Problem List   Diagnosis    Olecranon bursitis    Gout, arthritis    Hyperlipidemia    Anemia, Hgb 13.1    Lumbago due to displacement of intervertebral disc    Male hypogonadism    Alcohol abuse with intoxication, daily    Alcohol induced fatty liver    KYLEIGH (obstructive sleep apnea), not using CPAP    Kidney calculi    BPH (benign prostatic hypertrophy)    Diverticulosis    Cardiovascular risk factor, FCVRS 5% on Zocor    Insomnia    Anxiety    Allergic rhinitis    Essential hypertension    History of alcohol abuse    Dysphagia     HPI  Review of Systems   Constitutional: Negative for fatigue and unexpected weight change.   Respiratory: Negative for chest tightness and shortness of breath.    Cardiovascular: Negative for chest pain, palpitations and leg swelling.   Gastrointestinal: Negative for abdominal pain.   Musculoskeletal: Negative for arthralgias.   Neurological: Negative for dizziness, syncope, light-headedness and headaches.       Objective:      Physical Exam   Constitutional: He is oriented to person, place, and time. He appears well-developed and well-nourished.   Cardiovascular: Normal rate, regular rhythm and normal heart sounds.    Pulmonary/Chest: Effort normal and breath sounds normal.   Musculoskeletal: He exhibits no edema.   Neurological: He is alert and oriented to person, place, and time.   Skin: Skin is warm and dry.   Psychiatric: He has a normal mood and affect.   Nursing note and vitals reviewed.      Assessment:       1. KYLEIGH on CPAP    2. Acute gout of foot, unspecified cause, unspecified laterality    3. Essential hypertension    4. Insomnia, unspecified type    5. Hyperlipidemia, unspecified hyperlipidemia type    6. Anemia, unspecified type    7. Gout, arthritis        Plan:       1. Acute gout of foot, unspecified cause, unspecified  laterality  Controlled on current medications.  Continue current medications.    - allopurinol (ZYLOPRIM) 300 MG tablet; Take 1 tablet (300 mg total) by mouth once daily.  Dispense: 90 tablet; Refill: 3    2. Essential hypertension  Controlled on current medications.  Continue current medications.    - amlodipine-benazepril 5-20 mg (LOTREL) 5-20 mg per capsule; Take 1 capsule by mouth once daily.  Dispense: 90 capsule; Refill: 3    3. Insomnia, unspecified type  Controlled on current medications.  Continue current medications.    - zolpidem (AMBIEN) 10 mg Tab; Take 1 tablet (10 mg total) by mouth nightly as needed.  Dispense: 30 tablet; Refill: 2    4. KYLEIGH on CPAP  Order for new machine and supplies  - Home Sleep Studies; Future    5. Hyperlipidemia, unspecified hyperlipidemia type  Stable condition.  Continue current medications.  Will adjust based on lab findings or if condition changes.    - Comprehensive metabolic panel; Future  - Lipid panel; Future    6. Anemia, unspecified type  Screen and treat as indicated:    - CBC auto differential; Future    7. Gout, arthritis  Screen and treat as indicated:    - Uric acid; Future    [facility] goal documentation:  Patient readiness: acceptance and barriers:readiness  During the course of the visit the patient was educated and counseled about the following: Hypertension:   Dietary sodium restriction.  Regular aerobic exercise.  Goals: Hypertension: Reduce Blood Pressure  Goal/Outcomes met:Hypertension  The following self management tools provided:none  Patient Instructions (the written plan) was given to the patient/family: Yes  Time spent with patient: 20 minutes    Patient with be reevaluated in 6 months or sooner prn    Greater than 50% of this visit was spent counseling as described in above documentation:Yes

## 2018-01-17 ENCOUNTER — TELEPHONE (OUTPATIENT)
Dept: FAMILY MEDICINE | Facility: CLINIC | Age: 63
End: 2018-01-17

## 2018-01-17 DIAGNOSIS — G47.33 OSA (OBSTRUCTIVE SLEEP APNEA): Primary | ICD-10-CM

## 2018-01-18 NOTE — TELEPHONE ENCOUNTER
Notify patient: home sleep study showed significant sleep apnea.  A CPAP titration study at the lab so we can get a home CPAP and right settings is recommended.  I have placed the order

## 2018-01-18 NOTE — TELEPHONE ENCOUNTER
----- Message from TAWANA SPICER sent at 1/9/2018  3:11 PM CST -----  Good afternoon,  Mr. Oden completed his PSG sleep study.  It is in .  Let us kow if you would like us to continue forward with a titration study.    Thank you  Tawana

## 2018-02-05 ENCOUNTER — TELEPHONE (OUTPATIENT)
Dept: FAMILY MEDICINE | Facility: CLINIC | Age: 63
End: 2018-02-05

## 2018-02-05 NOTE — TELEPHONE ENCOUNTER
A CPAP titration study at the lab so we can get a home CPAP and right settings is recommended.  I have placed the order.

## 2018-02-19 ENCOUNTER — TELEPHONE (OUTPATIENT)
Dept: FAMILY MEDICINE | Facility: CLINIC | Age: 63
End: 2018-02-19

## 2018-02-19 NOTE — TELEPHONE ENCOUNTER
----- Message from Rita Coto sent at 2/19/2018 11:34 AM CST -----  Contact: pt [phone]  Patient called and asked  For the test results on his Sleeps study test results.

## 2018-02-22 ENCOUNTER — PATIENT MESSAGE (OUTPATIENT)
Dept: FAMILY MEDICINE | Facility: CLINIC | Age: 63
End: 2018-02-22

## 2018-02-22 DIAGNOSIS — G47.33 OSA ON CPAP: Primary | ICD-10-CM

## 2018-02-22 NOTE — TELEPHONE ENCOUNTER
Sleep study obtained from [facility], sent to be resulted. Will call pt as soon as its completed. Will send message to patient as well.

## 2018-02-23 ENCOUNTER — PATIENT MESSAGE (OUTPATIENT)
Dept: FAMILY MEDICINE | Facility: CLINIC | Age: 63
End: 2018-02-23

## 2018-03-08 ENCOUNTER — TELEPHONE (OUTPATIENT)
Dept: FAMILY MEDICINE | Facility: CLINIC | Age: 63
End: 2018-03-08

## 2018-03-08 NOTE — TELEPHONE ENCOUNTER
----- Message from Josephine Zaragoza sent at 3/7/2018  4:17 PM CST -----  Contact: pt and wife  Pt and his wife are calling to get the results/findings of sleep study done over a month ago,have been leaving messages and no one has called back......[phone] or [phone]

## 2018-03-08 NOTE — TELEPHONE ENCOUNTER
Spoke with patient to let him know that I called [facility] to find out the status of his CPAP machine and supplies. They could not find him in there system. So, I refaxed his paperwork to them today. Advised that if he does not hear from them by the beginning of next week to call me back and I would cindy into it.

## 2018-03-24 DIAGNOSIS — G89.29 CHRONIC WRIST PAIN, UNSPECIFIED LATERALITY: ICD-10-CM

## 2018-03-24 DIAGNOSIS — M25.539 CHRONIC WRIST PAIN, UNSPECIFIED LATERALITY: ICD-10-CM

## 2018-03-26 RX ORDER — MELOXICAM 7.5 MG/1
7.5 TABLET ORAL DAILY
Qty: 90 TABLET | Refills: 1 | Status: SHIPPED | OUTPATIENT
Start: 2018-03-26 | End: 2018-12-10 | Stop reason: SDUPTHER

## 2018-04-23 DIAGNOSIS — I10 ESSENTIAL HYPERTENSION: ICD-10-CM

## 2018-04-23 RX ORDER — NEBIVOLOL 5 MG/1
5 TABLET ORAL DAILY
Qty: 90 TABLET | Refills: 1 | Status: SHIPPED | OUTPATIENT
Start: 2018-04-23 | End: 2018-06-19

## 2018-04-30 DIAGNOSIS — K21.9 GASTROESOPHAGEAL REFLUX DISEASE, ESOPHAGITIS PRESENCE NOT SPECIFIED: ICD-10-CM

## 2018-04-30 RX ORDER — OMEPRAZOLE 20 MG/1
20 CAPSULE, DELAYED RELEASE ORAL DAILY
Qty: 90 CAPSULE | Refills: 2 | Status: SHIPPED | OUTPATIENT
Start: 2018-04-30 | End: 2018-06-19

## 2018-06-15 ENCOUNTER — LAB VISIT (OUTPATIENT)
Dept: LAB | Facility: HOSPITAL | Age: 63
End: 2018-06-15
Attending: FAMILY MEDICINE
Payer: COMMERCIAL

## 2018-06-15 DIAGNOSIS — M10.9 GOUT, ARTHRITIS: ICD-10-CM

## 2018-06-15 DIAGNOSIS — E78.5 HYPERLIPIDEMIA, UNSPECIFIED HYPERLIPIDEMIA TYPE: ICD-10-CM

## 2018-06-15 DIAGNOSIS — D64.9 ANEMIA, UNSPECIFIED TYPE: ICD-10-CM

## 2018-06-15 LAB
ALBUMIN SERPL BCP-MCNC: 3.8 G/DL
ALP SERPL-CCNC: 51 U/L
ALT SERPL W/O P-5'-P-CCNC: 20 U/L
ANION GAP SERPL CALC-SCNC: 9 MMOL/L
AST SERPL-CCNC: 20 U/L
BASOPHILS # BLD AUTO: 0 K/UL
BASOPHILS NFR BLD: 0.3 %
BILIRUB SERPL-MCNC: 0.7 MG/DL
BUN SERPL-MCNC: 16 MG/DL
CALCIUM SERPL-MCNC: 9.5 MG/DL
CHLORIDE SERPL-SCNC: 107 MMOL/L
CHOLEST SERPL-MCNC: 149 MG/DL
CHOLEST/HDLC SERPL: 3.4 {RATIO}
CO2 SERPL-SCNC: 27 MMOL/L
CREAT SERPL-MCNC: 0.9 MG/DL
DIFFERENTIAL METHOD: ABNORMAL
EOSINOPHIL # BLD AUTO: 0.2 K/UL
EOSINOPHIL NFR BLD: 2.5 %
ERYTHROCYTE [DISTWIDTH] IN BLOOD BY AUTOMATED COUNT: 14.4 %
EST. GFR  (AFRICAN AMERICAN): >60 ML/MIN/1.73 M^2
EST. GFR  (NON AFRICAN AMERICAN): >60 ML/MIN/1.73 M^2
GLUCOSE SERPL-MCNC: 75 MG/DL
HCT VFR BLD AUTO: 36 %
HDLC SERPL-MCNC: 44 MG/DL
HDLC SERPL: 29.5 %
HGB BLD-MCNC: 12.4 G/DL
LDLC SERPL CALC-MCNC: 95.2 MG/DL
LYMPHOCYTES # BLD AUTO: 2.3 K/UL
LYMPHOCYTES NFR BLD: 35.9 %
MCH RBC QN AUTO: 31.8 PG
MCHC RBC AUTO-ENTMCNC: 34.4 G/DL
MCV RBC AUTO: 92 FL
MONOCYTES # BLD AUTO: 0.6 K/UL
MONOCYTES NFR BLD: 9.3 %
NEUTROPHILS # BLD AUTO: 3.3 K/UL
NEUTROPHILS NFR BLD: 52 %
NONHDLC SERPL-MCNC: 105 MG/DL
PLATELET # BLD AUTO: 167 K/UL
PMV BLD AUTO: 8.1 FL
POTASSIUM SERPL-SCNC: 3.6 MMOL/L
PROT SERPL-MCNC: 6.7 G/DL
RBC # BLD AUTO: 3.9 M/UL
SODIUM SERPL-SCNC: 143 MMOL/L
TRIGL SERPL-MCNC: 49 MG/DL
URATE SERPL-MCNC: 4.1 MG/DL
WBC # BLD AUTO: 6.4 K/UL

## 2018-06-15 PROCEDURE — 85025 COMPLETE CBC W/AUTO DIFF WBC: CPT

## 2018-06-15 PROCEDURE — 80053 COMPREHEN METABOLIC PANEL: CPT

## 2018-06-15 PROCEDURE — 84550 ASSAY OF BLOOD/URIC ACID: CPT

## 2018-06-15 PROCEDURE — 80061 LIPID PANEL: CPT

## 2018-06-15 PROCEDURE — 36415 COLL VENOUS BLD VENIPUNCTURE: CPT

## 2018-06-18 ENCOUNTER — DOCUMENTATION ONLY (OUTPATIENT)
Dept: FAMILY MEDICINE | Facility: CLINIC | Age: 63
End: 2018-06-18

## 2018-06-18 NOTE — PROGRESS NOTES
Pre-Visit Chart Review  For Appointment Scheduled on 6-19-18    Health Maintenance Due   Topic Date Due    TETANUS VACCINE  11/28/1972    Pneumococcal PPSV23 (Medium Risk) (1) 11/28/1972    Zoster Vaccine  11/28/2014

## 2018-06-19 ENCOUNTER — OFFICE VISIT (OUTPATIENT)
Dept: FAMILY MEDICINE | Facility: CLINIC | Age: 63
End: 2018-06-19
Payer: COMMERCIAL

## 2018-06-19 VITALS
HEART RATE: 52 BPM | SYSTOLIC BLOOD PRESSURE: 96 MMHG | BODY MASS INDEX: 27.6 KG/M2 | TEMPERATURE: 98 F | HEIGHT: 68 IN | WEIGHT: 182.13 LBS | DIASTOLIC BLOOD PRESSURE: 57 MMHG

## 2018-06-19 DIAGNOSIS — M10.9 GOUT, ARTHRITIS: ICD-10-CM

## 2018-06-19 DIAGNOSIS — Z23 NEED FOR TDAP VACCINATION: ICD-10-CM

## 2018-06-19 DIAGNOSIS — I10 ESSENTIAL HYPERTENSION: Primary | ICD-10-CM

## 2018-06-19 DIAGNOSIS — Z12.5 PROSTATE CANCER SCREENING: ICD-10-CM

## 2018-06-19 DIAGNOSIS — E78.2 MIXED HYPERLIPIDEMIA: ICD-10-CM

## 2018-06-19 PROCEDURE — 99214 OFFICE O/P EST MOD 30 MIN: CPT | Mod: 25,S$GLB,, | Performed by: FAMILY MEDICINE

## 2018-06-19 PROCEDURE — 3078F DIAST BP <80 MM HG: CPT | Mod: CPTII,S$GLB,, | Performed by: FAMILY MEDICINE

## 2018-06-19 PROCEDURE — 3008F BODY MASS INDEX DOCD: CPT | Mod: CPTII,S$GLB,, | Performed by: FAMILY MEDICINE

## 2018-06-19 PROCEDURE — 99999 PR PBB SHADOW E&M-EST. PATIENT-LVL III: CPT | Mod: PBBFAC,,, | Performed by: FAMILY MEDICINE

## 2018-06-19 PROCEDURE — 3074F SYST BP LT 130 MM HG: CPT | Mod: CPTII,S$GLB,, | Performed by: FAMILY MEDICINE

## 2018-06-19 PROCEDURE — 90715 TDAP VACCINE 7 YRS/> IM: CPT | Mod: S$GLB,,, | Performed by: FAMILY MEDICINE

## 2018-06-19 PROCEDURE — 90471 IMMUNIZATION ADMIN: CPT | Mod: S$GLB,,, | Performed by: FAMILY MEDICINE

## 2018-06-19 RX ORDER — ALLOPURINOL 100 MG/1
200 TABLET ORAL DAILY
Qty: 60 TABLET | Refills: 5 | Status: SHIPPED | OUTPATIENT
Start: 2018-06-19 | End: 2018-11-14 | Stop reason: SDUPTHER

## 2018-06-19 RX ORDER — UBIDECARENONE 75 MG
500 CAPSULE ORAL DAILY
COMMUNITY
End: 2019-02-26 | Stop reason: ALTCHOICE

## 2018-06-19 NOTE — PROGRESS NOTES
Subjective:       Patient ID: Ang Oden Jr. is a 62 y.o. male.    Chief Complaint: Follow-up    HPI  Review of Systems   Constitutional: Negative for fatigue and unexpected weight change.   Respiratory: Negative for chest tightness and shortness of breath.    Cardiovascular: Negative for chest pain, palpitations and leg swelling.   Gastrointestinal: Negative for abdominal pain.   Musculoskeletal: Negative for arthralgias.   Neurological: Negative for dizziness, syncope, light-headedness and headaches.       Patient Active Problem List   Diagnosis    Olecranon bursitis    Gout, arthritis    Hyperlipidemia    Anemia, Hgb 13.1    Lumbago due to displacement of intervertebral disc    Male hypogonadism    Alcohol abuse with intoxication, daily    Alcohol induced fatty liver    KYLEIGH (obstructive sleep apnea), not using CPAP    Kidney calculi    BPH (benign prostatic hypertrophy)    Diverticulosis    Cardiovascular risk factor, FCVRS 5% on Zocor    Insomnia    Anxiety    Allergic rhinitis    Essential hypertension    History of alcohol abuse    Dysphagia     Patient is here for a chronic conditions follow up.    Lab Visit on 06/15/2018   Component Date Value Ref Range Status    WBC 06/15/2018 6.40  3.90 - 12.70 K/uL Final    RBC 06/15/2018 3.90* 4.60 - 6.20 M/uL Final    Hemoglobin 06/15/2018 12.4* 14.0 - 18.0 g/dL Final    Hematocrit 06/15/2018 36.0* 40.0 - 54.0 % Final    MCV 06/15/2018 92  82 - 98 fL Final    MCH 06/15/2018 31.8* 27.0 - 31.0 pg Final    MCHC 06/15/2018 34.4  32.0 - 36.0 g/dL Final    RDW 06/15/2018 14.4  11.5 - 14.5 % Final    Platelets 06/15/2018 167  150 - 350 K/uL Final    MPV 06/15/2018 8.1* 9.2 - 12.9 fL Final    Gran # (ANC) 06/15/2018 3.3  1.8 - 7.7 K/uL Final    Lymph # 06/15/2018 2.3  1.0 - 4.8 K/uL Final    Mono # 06/15/2018 0.6  0.3 - 1.0 K/uL Final    Eos # 06/15/2018 0.2  0.0 - 0.5 K/uL Final    Baso # 06/15/2018 0.00  0.00 - 0.20 K/uL Final    Gran%  06/15/2018 52.0  38.0 - 73.0 % Final    Lymph% 06/15/2018 35.9  18.0 - 48.0 % Final    Mono% 06/15/2018 9.3  4.0 - 15.0 % Final    Eosinophil% 06/15/2018 2.5  0.0 - 8.0 % Final    Basophil% 06/15/2018 0.3  0.0 - 1.9 % Final    Differential Method 06/15/2018 Automated   Final    Sodium 06/15/2018 143  136 - 145 mmol/L Final    Potassium 06/15/2018 3.6  3.5 - 5.1 mmol/L Final    Chloride 06/15/2018 107  95 - 110 mmol/L Final    CO2 06/15/2018 27  23 - 29 mmol/L Final    Glucose 06/15/2018 75  70 - 110 mg/dL Final    BUN, Bld 06/15/2018 16  8 - 23 mg/dL Final    Creatinine 06/15/2018 0.9  0.5 - 1.4 mg/dL Final    Calcium 06/15/2018 9.5  8.7 - 10.5 mg/dL Final    Total Protein 06/15/2018 6.7  6.0 - 8.4 g/dL Final    Albumin 06/15/2018 3.8  3.5 - 5.2 g/dL Final    Total Bilirubin 06/15/2018 0.7  0.1 - 1.0 mg/dL Final    Alkaline Phosphatase 06/15/2018 51* 55 - 135 U/L Final    AST 06/15/2018 20  10 - 40 U/L Final    ALT 06/15/2018 20  10 - 44 U/L Final    Anion Gap 06/15/2018 9  8 - 16 mmol/L Final    eGFR if African American 06/15/2018 >60  >60 mL/min/1.73 m^2 Final    eGFR if non African American 06/15/2018 >60  >60 mL/min/1.73 m^2 Final    Cholesterol 06/15/2018 149  120 - 199 mg/dL Final    Triglycerides 06/15/2018 49  30 - 150 mg/dL Final    HDL 06/15/2018 44  40 - 75 mg/dL Final    LDL Cholesterol 06/15/2018 95.2  63.0 - 159.0 mg/dL Final    HDL/Chol Ratio 06/15/2018 29.5  20.0 - 50.0 % Final    Total Cholesterol/HDL Ratio 06/15/2018 3.4  2.0 - 5.0 Final    Non-HDL Cholesterol 06/15/2018 105  mg/dL Final    Uric Acid 06/15/2018 4.1  3.4 - 7.0 mg/dL Final   }  Objective:      Physical Exam   Constitutional: He is oriented to person, place, and time. He appears well-developed and well-nourished.   Cardiovascular: Normal rate, regular rhythm and normal heart sounds.    Pulmonary/Chest: Effort normal and breath sounds normal.   Musculoskeletal: He exhibits no edema.   Neurological: He is  alert and oriented to person, place, and time.   Skin: Skin is warm and dry.   Psychiatric: He has a normal mood and affect.   Nursing note and vitals reviewed.      Assessment:       1. Essential hypertension    2. Gout, arthritis    3. Mixed hyperlipidemia    4. Need for Tdap vaccination    5. Prostate cancer screening        Plan:       1. Essential hypertension  Stop bystolic    2. Gout, arthritis  decrease  - allopurinol (ZYLOPRIM) 100 MG tablet; Take 2 tablets (200 mg total) by mouth once daily.  Dispense: 60 tablet; Refill: 5  - Uric acid; Future    3. Mixed hyperlipidemia  Controlled on current medications.  Continue current medications.    - CBC auto differential; Future  - Comprehensive metabolic panel; Future  - Lipid panel; Future    4. Need for Tdap vaccination  Immunize today.  Counseled patient on risks, benefits and side effects.  Patient elected to proceed with vaccination.    - (In Office Administered) Tdap Vaccine    5. Prostate cancer screening  Discussed benefits, risks and limitations of PSA testing and current USPSTF guidelines.  Patient expressed verbal understanding and wished to pursue screening.    - PSA, Screening; Future      Reeval 6 months or sooner prn

## 2018-11-14 DIAGNOSIS — M10.9 GOUT, ARTHRITIS: ICD-10-CM

## 2018-11-14 RX ORDER — ALLOPURINOL 100 MG/1
200 TABLET ORAL DAILY
Qty: 60 TABLET | Refills: 5 | Status: SHIPPED | OUTPATIENT
Start: 2018-11-14 | End: 2018-12-10 | Stop reason: DRUGHIGH

## 2018-11-27 ENCOUNTER — PATIENT OUTREACH (OUTPATIENT)
Dept: ADMINISTRATIVE | Facility: HOSPITAL | Age: 63
End: 2018-11-27

## 2018-11-27 NOTE — LETTER
December 4, 2018    Ang Oden Jr.  [address] [facility] [address]             [facility]  [address]  [facility] [address]  Phone: [phone] Dear [facility] is committed to your overall health and would like to ensure that you are up to date on your recommended test and/or procedures.   Jordana Anderson MD  has found that your chart shows you may be due for the following:     COLORECTAL CANCER SCREENING       If you have had any of the above done at another facility, please let us know so that we may obtain copies from that facility.  If you have a copy of these records, please provide a copy for us to scan into your chart.  You are welcome to request that the report be faxed to us at  ([phone]).     Otherwise, please schedule these appointments at your earliest convenience by calling [phone] or going to [email or web address].         Sincerely,   Your [facility]   MD Aleta Campuzano LPN Clinical Care Coordinator   [facility]   [address] [place] [id number]   Phone [phone]   Fax [phone]

## 2018-12-04 NOTE — PROGRESS NOTES
"Attempted to outreach patient for pre-visit via "[employer]Roll", no answer after a week. Sending outreach via Mail Out Letter now.    "

## 2018-12-07 ENCOUNTER — LAB VISIT (OUTPATIENT)
Dept: LAB | Facility: HOSPITAL | Age: 63
End: 2018-12-07
Attending: FAMILY MEDICINE
Payer: COMMERCIAL

## 2018-12-07 DIAGNOSIS — Z12.5 PROSTATE CANCER SCREENING: ICD-10-CM

## 2018-12-07 DIAGNOSIS — M10.9 GOUT, ARTHRITIS: ICD-10-CM

## 2018-12-07 DIAGNOSIS — E78.2 MIXED HYPERLIPIDEMIA: ICD-10-CM

## 2018-12-07 LAB
ALBUMIN SERPL BCP-MCNC: 4 G/DL
ALP SERPL-CCNC: 61 U/L
ALT SERPL W/O P-5'-P-CCNC: 25 U/L
ANION GAP SERPL CALC-SCNC: 7 MMOL/L
AST SERPL-CCNC: 25 U/L
BASOPHILS # BLD AUTO: 0 K/UL
BASOPHILS NFR BLD: 0.6 %
BILIRUB SERPL-MCNC: 0.8 MG/DL
BUN SERPL-MCNC: 11 MG/DL
CALCIUM SERPL-MCNC: 9.9 MG/DL
CHLORIDE SERPL-SCNC: 104 MMOL/L
CHOLEST SERPL-MCNC: 153 MG/DL
CHOLEST/HDLC SERPL: 2.8 {RATIO}
CO2 SERPL-SCNC: 29 MMOL/L
COMPLEXED PSA SERPL-MCNC: 0.46 NG/ML
CREAT SERPL-MCNC: 0.8 MG/DL
DIFFERENTIAL METHOD: ABNORMAL
EOSINOPHIL # BLD AUTO: 0.3 K/UL
EOSINOPHIL NFR BLD: 4 %
ERYTHROCYTE [DISTWIDTH] IN BLOOD BY AUTOMATED COUNT: 14.6 %
EST. GFR  (AFRICAN AMERICAN): >60 ML/MIN/1.73 M^2
EST. GFR  (NON AFRICAN AMERICAN): >60 ML/MIN/1.73 M^2
GLUCOSE SERPL-MCNC: 80 MG/DL
HCT VFR BLD AUTO: 39.8 %
HDLC SERPL-MCNC: 54 MG/DL
HDLC SERPL: 35.3 %
HGB BLD-MCNC: 13.2 G/DL
LDLC SERPL CALC-MCNC: 88.4 MG/DL
LYMPHOCYTES # BLD AUTO: 2.3 K/UL
LYMPHOCYTES NFR BLD: 29.1 %
MCH RBC QN AUTO: 30.5 PG
MCHC RBC AUTO-ENTMCNC: 33.1 G/DL
MCV RBC AUTO: 92 FL
MONOCYTES # BLD AUTO: 0.7 K/UL
MONOCYTES NFR BLD: 9 %
NEUTROPHILS # BLD AUTO: 4.5 K/UL
NEUTROPHILS NFR BLD: 57.3 %
NONHDLC SERPL-MCNC: 99 MG/DL
PLATELET # BLD AUTO: 250 K/UL
PMV BLD AUTO: 7.1 FL
POTASSIUM SERPL-SCNC: 3.9 MMOL/L
PROT SERPL-MCNC: 7.3 G/DL
RBC # BLD AUTO: 4.32 M/UL
SODIUM SERPL-SCNC: 140 MMOL/L
TRIGL SERPL-MCNC: 53 MG/DL
URATE SERPL-MCNC: 5.2 MG/DL
WBC # BLD AUTO: 7.9 K/UL

## 2018-12-07 PROCEDURE — 84153 ASSAY OF PSA TOTAL: CPT

## 2018-12-07 PROCEDURE — 84550 ASSAY OF BLOOD/URIC ACID: CPT

## 2018-12-07 PROCEDURE — 36415 COLL VENOUS BLD VENIPUNCTURE: CPT

## 2018-12-07 PROCEDURE — 80061 LIPID PANEL: CPT

## 2018-12-07 PROCEDURE — 80053 COMPREHEN METABOLIC PANEL: CPT

## 2018-12-07 PROCEDURE — 85025 COMPLETE CBC W/AUTO DIFF WBC: CPT

## 2018-12-10 ENCOUNTER — OFFICE VISIT (OUTPATIENT)
Dept: FAMILY MEDICINE | Facility: CLINIC | Age: 63
End: 2018-12-10
Payer: COMMERCIAL

## 2018-12-10 VITALS
OXYGEN SATURATION: 97 % | TEMPERATURE: 98 F | WEIGHT: 185.63 LBS | BODY MASS INDEX: 28.13 KG/M2 | HEART RATE: 69 BPM | HEIGHT: 68 IN | SYSTOLIC BLOOD PRESSURE: 105 MMHG | DIASTOLIC BLOOD PRESSURE: 69 MMHG

## 2018-12-10 DIAGNOSIS — Z12.11 SCREENING FOR COLON CANCER: ICD-10-CM

## 2018-12-10 DIAGNOSIS — M10.9 GOUT, ARTHRITIS: ICD-10-CM

## 2018-12-10 DIAGNOSIS — M25.539 CHRONIC WRIST PAIN, UNSPECIFIED LATERALITY: ICD-10-CM

## 2018-12-10 DIAGNOSIS — G47.00 INSOMNIA, UNSPECIFIED TYPE: ICD-10-CM

## 2018-12-10 DIAGNOSIS — G89.29 CHRONIC WRIST PAIN, UNSPECIFIED LATERALITY: ICD-10-CM

## 2018-12-10 DIAGNOSIS — E78.2 MIXED HYPERLIPIDEMIA: ICD-10-CM

## 2018-12-10 DIAGNOSIS — D64.9 ANEMIA, UNSPECIFIED TYPE: ICD-10-CM

## 2018-12-10 DIAGNOSIS — I10 ESSENTIAL HYPERTENSION: Primary | ICD-10-CM

## 2018-12-10 DIAGNOSIS — J30.9 ALLERGIC RHINITIS, UNSPECIFIED SEASONALITY, UNSPECIFIED TRIGGER: ICD-10-CM

## 2018-12-10 DIAGNOSIS — F41.9 ANXIETY: ICD-10-CM

## 2018-12-10 PROCEDURE — 99214 OFFICE O/P EST MOD 30 MIN: CPT | Mod: S$GLB,,, | Performed by: NURSE PRACTITIONER

## 2018-12-10 PROCEDURE — 99999 PR PBB SHADOW E&M-EST. PATIENT-LVL IV: CPT | Mod: PBBFAC,,, | Performed by: NURSE PRACTITIONER

## 2018-12-10 PROCEDURE — 3074F SYST BP LT 130 MM HG: CPT | Mod: CPTII,S$GLB,, | Performed by: NURSE PRACTITIONER

## 2018-12-10 PROCEDURE — 3008F BODY MASS INDEX DOCD: CPT | Mod: CPTII,S$GLB,, | Performed by: NURSE PRACTITIONER

## 2018-12-10 PROCEDURE — 3078F DIAST BP <80 MM HG: CPT | Mod: CPTII,S$GLB,, | Performed by: NURSE PRACTITIONER

## 2018-12-10 RX ORDER — SIMVASTATIN 10 MG/1
10 TABLET, FILM COATED ORAL NIGHTLY
Qty: 90 TABLET | Refills: 0 | Status: SHIPPED | OUTPATIENT
Start: 2018-12-10 | End: 2019-03-12 | Stop reason: SDUPTHER

## 2018-12-10 RX ORDER — MELOXICAM 7.5 MG/1
7.5 TABLET ORAL DAILY
Qty: 90 TABLET | Refills: 1 | Status: SHIPPED | OUTPATIENT
Start: 2018-12-10 | End: 2019-07-23 | Stop reason: ALTCHOICE

## 2018-12-10 RX ORDER — ZOLPIDEM TARTRATE 10 MG/1
10 TABLET ORAL NIGHTLY PRN
Qty: 30 TABLET | Refills: 2 | Status: SHIPPED | OUTPATIENT
Start: 2018-12-10 | End: 2019-07-23 | Stop reason: ALTCHOICE

## 2018-12-10 RX ORDER — ALLOPURINOL 100 MG/1
200 TABLET ORAL DAILY
Qty: 60 TABLET | Refills: 5 | Status: CANCELLED | OUTPATIENT
Start: 2018-12-10

## 2018-12-10 RX ORDER — ALLOPURINOL 100 MG/1
100 TABLET ORAL DAILY
Qty: 60 TABLET | Refills: 5
Start: 2018-12-10 | End: 2019-05-02

## 2018-12-10 RX ORDER — MONTELUKAST SODIUM 10 MG/1
10 TABLET ORAL NIGHTLY
Qty: 90 TABLET | Refills: 3 | Status: SHIPPED | OUTPATIENT
Start: 2018-12-10 | End: 2019-07-23 | Stop reason: ALTCHOICE

## 2018-12-10 NOTE — PROGRESS NOTES
Subjective:       Patient ID: Ang Oden Jr. is a 63 y.o. male.    Chief Complaint: Follow-up (6month)    Mr. Oden presents today for a 6 month follow up appointment. He has no new complaints and is feeling well. He reports he has made drastic lifestyle changes in the last few months. Because of this and his current BP reading today, he would like to try to discontinue his BP medication. The patient was educated that the medication most likely keeping his BP stable, but he asked if we could do a trial period. He was in agreement to a BP recheck in one month, and if that it increased, he would re-start the medication. Dr. Anderson did stop his bystolic at his last visit. He had labs drawn prior to the appointment and these were reviewed.       Patient Active Problem List   Diagnosis    Olecranon bursitis    Gout, arthritis    Hyperlipidemia    Anemia, Hgb 13.1    Lumbago due to displacement of intervertebral disc    Male hypogonadism    Alcohol abuse with intoxication, daily    Alcohol induced fatty liver    KYLEIGH (obstructive sleep apnea), not using CPAP    Kidney calculi    BPH (benign prostatic hypertrophy)    Diverticulosis    Cardiovascular risk factor, FCVRS 5% on Zocor    Insomnia    Anxiety    Allergic rhinitis    Essential hypertension    History of alcohol abuse    Dysphagia       Review of Systems   Constitutional: Negative for fatigue and unexpected weight change.   HENT: Negative for sore throat.    Respiratory: Negative for chest tightness and shortness of breath.    Cardiovascular: Negative for chest pain, palpitations and leg swelling.   Gastrointestinal: Negative for abdominal pain.   Musculoskeletal: Negative for arthralgias.   Skin: Negative for rash.   Neurological: Negative for dizziness, syncope, light-headedness and headaches.       Objective:      Physical Exam   Constitutional: He is oriented to person, place, and time. Vital signs are normal. He appears well-developed  and well-nourished. He is cooperative.   HENT:   Head: Normocephalic and atraumatic.   Right Ear: External ear normal.   Left Ear: External ear normal.   Nose: Nose normal.   Mouth/Throat: Oropharynx is clear and moist.   Eyes: Pupils are equal, round, and reactive to light.   Neck: Normal range of motion. Neck supple.   Cardiovascular: Normal rate, regular rhythm and normal heart sounds.   No murmur heard.  Pulmonary/Chest: Effort normal and breath sounds normal. No respiratory distress. He has no wheezes. He has no rales.   Abdominal: Soft. Normal appearance and bowel sounds are normal. He exhibits no distension. There is no tenderness.   Musculoskeletal: Normal range of motion. He exhibits no edema.   Neurological: He is alert and oriented to person, place, and time.   Skin: Skin is warm and dry. Capillary refill takes less than 2 seconds. No cyanosis. Nails show no clubbing.   Psychiatric: He has a normal mood and affect. His behavior is normal. Thought content normal. Cognition and memory are normal.   Vitals reviewed.      Assessment:       1. Essential hypertension    2. Mixed hyperlipidemia    3. Gout, arthritis    4. Anxiety    5. Insomnia, unspecified type    6. Chronic wrist pain, unspecified laterality    7. Allergic rhinitis    8. Anemia, unspecified type    9. Screening for colon cancer        Plan:       Ang was seen today for follow-up.    Diagnoses and all orders for this visit:    Essential hypertension  -     CBC auto differential; Future  -     Comprehensive metabolic panel; Future  Patient wishes to discontinue his Lotrel for BP at this time. Based on his vitals today, I am OK with a trial period; bystolic discontinued previously due to hypotension  BP recheck in 1 month, if high, he is in agreement to restart the medication   Labs prior to next appointment     Mixed hyperlipidemia  -     Lipid panel; Future  -     simvastatin (ZOCOR) 10 MG tablet; Take 1 tablet (10 mg total) by mouth every  evening.  Stable, continue current regimen, labs prior to next appointment    Gout, arthritis  Stable and well controlled on allopurinol  Patient requested to decrease the dose  Decreased from 200 mg daily to 100 mg daily    Anxiety  Quit taking Celexa 20 mg about two months ago, no issues  Continue current regimen     Insomnia, unspecified type  Controlled, continue current regimen  Refill request for Rk sent to Dr. Anderson    Chronic wrist pain, unspecified laterality  -     meloxicam (MOBIC) 7.5 MG tablet; Take 1 tablet (7.5 mg total) by mouth once daily.  Stable, continue current regimen    Allergic rhinitis  -     montelukast (SINGULAIR) 10 mg tablet; Take 1 tablet (10 mg total) by mouth every evening.  Stable, continue current regimen    Anemia, unspecified type  Stable and chronic, continue monitoring every 3-6 months, treat accordingly     Screening for colon cancer  -     Ambulatory referral to Gastroenterology      Patient readiness: acceptance and barriers:none    During the course of the visit the patient was educated and counseled about the following:     Hypertension:   Dietary sodium restriction.  Regular aerobic exercise.    Goals: Hypertension: Reduce Blood Pressure    Did patient meet goals/outcomes: Yes    The following self management tools provided: blood pressure log    Patient Instructions (the written plan) was given to the patient/family.     Time spent with patient: 30 minutes    Barriers to medications present (no )    Adverse reactions to current medications (no)    Over the counter medications reviewed (Yes)

## 2018-12-11 ENCOUNTER — TELEPHONE (OUTPATIENT)
Dept: GASTROENTEROLOGY | Facility: CLINIC | Age: 63
End: 2018-12-11

## 2018-12-11 RX ORDER — FLUTICASONE PROPIONATE 50 MCG
1 SPRAY, SUSPENSION (ML) NASAL DAILY
Qty: 48 ML | Refills: 3 | Status: SHIPPED | OUTPATIENT
Start: 2018-12-11 | End: 2020-01-07

## 2018-12-11 RX ORDER — CITALOPRAM 20 MG/1
20 TABLET, FILM COATED ORAL DAILY
Qty: 90 TABLET | Refills: 3 | Status: SHIPPED | OUTPATIENT
Start: 2018-12-11 | End: 2019-06-18

## 2018-12-11 NOTE — TELEPHONE ENCOUNTER
----- Message from Nancy Shah sent at 12/11/2018  8:24 AM CST -----  Type: Needs Medical Advice    Who Called:  Patient    Best Call Back Number: [phone] (home)     Additional Information:Patient called to schedule his colonoscopy, stating its been 6 years ago since last

## 2018-12-12 ENCOUNTER — TELEPHONE (OUTPATIENT)
Dept: GASTROENTEROLOGY | Facility: CLINIC | Age: 63
End: 2018-12-12

## 2018-12-12 ENCOUNTER — PATIENT MESSAGE (OUTPATIENT)
Dept: GASTROENTEROLOGY | Facility: CLINIC | Age: 63
End: 2018-12-12

## 2018-12-12 NOTE — TELEPHONE ENCOUNTER
Patient notified and understands per Dr. Santiago and new Gastro guidelines normal colonoscopy in 2011 patient due for repeat in 2021 and recall in chart will get reminder.

## 2019-01-10 ENCOUNTER — CLINICAL SUPPORT (OUTPATIENT)
Dept: FAMILY MEDICINE | Facility: CLINIC | Age: 64
End: 2019-01-10
Payer: COMMERCIAL

## 2019-01-10 NOTE — PROGRESS NOTES
Patient in for nurse BP check. /78 manually right arm. Patient states he checks his BP once a week at home. States his BP has been running around 115/70. Advised patient to continue with follow up appointments as scheduled.

## 2019-02-17 ENCOUNTER — HOSPITAL ENCOUNTER (EMERGENCY)
Facility: HOSPITAL | Age: 64
Discharge: HOME | End: 2019-02-17
Attending: EMERGENCY MEDICINE
Payer: COMMERCIAL

## 2019-02-17 VITALS
TEMPERATURE: 99 F | SYSTOLIC BLOOD PRESSURE: 117 MMHG | HEIGHT: 67 IN | HEART RATE: 56 BPM | OXYGEN SATURATION: 99 % | BODY MASS INDEX: 27.47 KG/M2 | DIASTOLIC BLOOD PRESSURE: 71 MMHG | WEIGHT: 175 LBS | RESPIRATION RATE: 16 BRPM

## 2019-02-17 DIAGNOSIS — M51.36 BULGING OF LUMBAR INTERVERTEBRAL DISC WITHOUT MYELOPATHY: ICD-10-CM

## 2019-02-17 DIAGNOSIS — N20.0 BILATERAL NEPHROLITHIASIS: Primary | ICD-10-CM

## 2019-02-17 LAB
ALBUMIN SERPL BCP-MCNC: 4 G/DL
ALP SERPL-CCNC: 43 U/L
ALT SERPL W/O P-5'-P-CCNC: 27 U/L
ANION GAP SERPL CALC-SCNC: 12 MMOL/L
AST SERPL-CCNC: 28 U/L
BACTERIA #/AREA URNS HPF: NORMAL /HPF
BASOPHILS # BLD AUTO: 0 K/UL
BASOPHILS NFR BLD: 0.4 %
BILIRUB SERPL-MCNC: 0.5 MG/DL
BILIRUB UR QL STRIP: NEGATIVE
BUN SERPL-MCNC: 21 MG/DL
CALCIUM SERPL-MCNC: 9.7 MG/DL
CHLORIDE SERPL-SCNC: 103 MMOL/L
CLARITY UR: CLEAR
CO2 SERPL-SCNC: 25 MMOL/L
COLOR UR: YELLOW
CREAT SERPL-MCNC: 1.4 MG/DL
DIFFERENTIAL METHOD: ABNORMAL
EOSINOPHIL # BLD AUTO: 0.2 K/UL
EOSINOPHIL NFR BLD: 2.1 %
ERYTHROCYTE [DISTWIDTH] IN BLOOD BY AUTOMATED COUNT: 13.9 %
EST. GFR  (AFRICAN AMERICAN): >60 ML/MIN/1.73 M^2
EST. GFR  (NON AFRICAN AMERICAN): 53 ML/MIN/1.73 M^2
GLUCOSE SERPL-MCNC: 97 MG/DL
GLUCOSE UR QL STRIP: NEGATIVE
HCT VFR BLD AUTO: 40.7 %
HGB BLD-MCNC: 13.4 G/DL
HGB UR QL STRIP: ABNORMAL
KETONES UR QL STRIP: ABNORMAL
LEUKOCYTE ESTERASE UR QL STRIP: NEGATIVE
LYMPHOCYTES # BLD AUTO: 1.6 K/UL
LYMPHOCYTES NFR BLD: 17.3 %
MCH RBC QN AUTO: 30.8 PG
MCHC RBC AUTO-ENTMCNC: 33 G/DL
MCV RBC AUTO: 93 FL
MICROSCOPIC COMMENT: NORMAL
MONOCYTES # BLD AUTO: 0.7 K/UL
MONOCYTES NFR BLD: 8.2 %
NEUTROPHILS # BLD AUTO: 6.5 K/UL
NEUTROPHILS NFR BLD: 72 %
NITRITE UR QL STRIP: NEGATIVE
PH UR STRIP: 6 [PH] (ref 5–8)
PLATELET # BLD AUTO: 199 K/UL
PMV BLD AUTO: 8.4 FL
POTASSIUM SERPL-SCNC: 3.6 MMOL/L
PROT SERPL-MCNC: 6.9 G/DL
PROT UR QL STRIP: NEGATIVE
RBC # BLD AUTO: 4.36 M/UL
RBC #/AREA URNS HPF: 4 /HPF (ref 0–4)
SODIUM SERPL-SCNC: 140 MMOL/L
SP GR UR STRIP: 1.02 (ref 1–1.03)
URN SPEC COLLECT METH UR: ABNORMAL
UROBILINOGEN UR STRIP-ACNC: NEGATIVE EU/DL
WBC # BLD AUTO: 9 K/UL
WBC #/AREA URNS HPF: 2 /HPF (ref 0–5)

## 2019-02-17 PROCEDURE — 99284 EMERGENCY DEPT VISIT MOD MDM: CPT | Mod: 25

## 2019-02-17 PROCEDURE — 85025 COMPLETE CBC W/AUTO DIFF WBC: CPT

## 2019-02-17 PROCEDURE — 36415 COLL VENOUS BLD VENIPUNCTURE: CPT

## 2019-02-17 PROCEDURE — 80053 COMPREHEN METABOLIC PANEL: CPT

## 2019-02-17 PROCEDURE — 25000003 PHARM REV CODE 250: Performed by: EMERGENCY MEDICINE

## 2019-02-17 PROCEDURE — 96361 HYDRATE IV INFUSION ADD-ON: CPT

## 2019-02-17 PROCEDURE — 96374 THER/PROPH/DIAG INJ IV PUSH: CPT

## 2019-02-17 PROCEDURE — 63600175 PHARM REV CODE 636 W HCPCS: Performed by: EMERGENCY MEDICINE

## 2019-02-17 PROCEDURE — 81000 URINALYSIS NONAUTO W/SCOPE: CPT

## 2019-02-17 RX ORDER — METHOCARBAMOL 500 MG/1
1000 TABLET, FILM COATED ORAL 3 TIMES DAILY
Qty: 30 TABLET | Refills: 0 | Status: SHIPPED | OUTPATIENT
Start: 2019-02-17 | End: 2019-02-22

## 2019-02-17 RX ORDER — KETOROLAC TROMETHAMINE 30 MG/ML
15 INJECTION, SOLUTION INTRAMUSCULAR; INTRAVENOUS
Status: COMPLETED | OUTPATIENT
Start: 2019-02-17 | End: 2019-02-17

## 2019-02-17 RX ORDER — IBUPROFEN 800 MG/1
800 TABLET ORAL EVERY 6 HOURS PRN
Qty: 20 TABLET | Refills: 0 | Status: SHIPPED | OUTPATIENT
Start: 2019-02-17 | End: 2019-02-26 | Stop reason: ALTCHOICE

## 2019-02-17 RX ADMIN — KETOROLAC TROMETHAMINE 15 MG: 30 INJECTION, SOLUTION INTRAMUSCULAR at 02:02

## 2019-02-17 RX ADMIN — SODIUM CHLORIDE 1000 ML: 0.9 INJECTION, SOLUTION INTRAVENOUS at 02:02

## 2019-02-17 NOTE — ED NOTES
Patient awake, alert and oriented x 3, skin warm and dry, in NAD with family at bedside.    Patient identifiers for Ang Oden Jr. checked and correct.  LOC:  Patient is awake, alert, and aware of environment with an appropriate affect. Patient is oriented x 3 and speaking appropriately.  APPEARANCE:  Patient resting comfortably and in no acute distress. Patient is clean and well groomed, patient's clothing is properly fastened.  SKIN:  The skin is warm and dry. Patient has normal skin turgor and moist mucus membranes. Skin is intact; no bruising or breakdown noted.  MUSCULOSKELETAL:  Patient is moving all extremities well, no obvious deformities noted. Pulses intact.   RESPIRATORY:  Airway is open and patent. Respirations are spontaneous and non-labored with normal effort and rate.  CARDIAC:  Patient has a normal rate and rhythm. No peripheral edema noted. Capillary refill < 3 seconds.  ABDOMEN:  No distention noted.  Soft and non-tender upon palpation.  Left flank pain.  NEUROLOGICAL:  PERRL. Facial expression is symmetrical. Hand grasps are equal bilaterally. Normal sensation in all extremities when touched with finger.  Allergies reported:    Review of patient's allergies indicates:   Allergen Reactions    No known drug allergies      OTHER NOTES:  Patient with history of kidney stones, CO left flank pain 10/10.  Denies nausea.

## 2019-02-17 NOTE — ED PROVIDER NOTES
Encounter Date: 2/17/2019    SCRIBE #1 NOTE: I, Maribel Coleman , am scribing for, and in the presence of,  Dr. Vogel . I have scribed the entire note.       History     Chief Complaint   Patient presents with    Flank Pain     Lt flank pain x 3 hours.  Hx of renal stones      02/17/2019  2:01 AM     The patient is a 63 y.o. male who is presenting with the gradual onset of worsening L sided flank pain that began x 3 hours PTA. The pt reports that the pain is constant, sharp, and radiates from the flank to the LLQ. He denies associated nasuea , vomiting, hematuria, or fever. Pt endorses hx of kidney stones and states that this pain is consistent. Peritest PMHx  includes CKD, HTN. No pertinent past surgical hx.             The history is provided by the patient and medical records.     Review of patient's allergies indicates:   Allergen Reactions    No known drug allergies      Past Medical History:   Diagnosis Date    Anemia     Anticoagulant long-term use     Chronic kidney disease     Chronic rhinitis     Disc     lower back    DJD (degenerative joint disease)     Foot pain     left foot    Gout, unspecified     HBP (high blood pressure)     High cholesterol     Kidney stone     Sleep apnea     Weight loss     15 pounds in two months     Past Surgical History:   Procedure Laterality Date    APPENDECTOMY      EGD (ESOPHAGOGASTRODUODENOSCOPY) N/A 9/24/2012    Performed by [facility] at [facility]    ESOPHAGOGASTRODUODENOSCOPY (EGD) N/A 4/10/2017    Performed by [facility]    VASECTOMY       Family History   Problem Relation Age of Onset    Cancer Mother     Heart disease Mother     Heart failure Mother     Liver disease Father         secondary to alcohol    Diabetes Maternal Grandmother     Allergies Son     Asthma Son     Urolithiasis Neg Hx     Prostate cancer Neg Hx     Kidney cancer Neg Hx     Eczema Neg Hx     Immunodeficiency Neg Hx     Angioedema  Neg Hx     Allergic rhinitis Neg Hx     Atopy Neg Hx     Rhinitis Neg Hx     Urticaria Neg Hx      Social History     Tobacco Use    Smoking status: Former Smoker     Last attempt to quit: 9/10/1982     Years since quittin.4    Smokeless tobacco: Never Used   Substance Use Topics    Alcohol use: Yes     Comment: About 1/2 gallon of whiskey per week.  Patient drinks daily.    Drug use: No     Review of Systems   Constitutional: Negative for fever.   HENT: Negative for congestion.    Eyes: Negative for visual disturbance.   Respiratory: Negative for wheezing.    Cardiovascular: Negative for chest pain.   Gastrointestinal: Positive for abdominal pain.   Genitourinary: Positive for flank pain. Negative for dysuria and hematuria.   Musculoskeletal: Negative for joint swelling.   Skin: Negative for rash.   Neurological: Negative for syncope.   Hematological: Does not bruise/bleed easily.   Psychiatric/Behavioral: Negative for confusion.       Physical Exam     Initial Vitals [19 0150]   BP Pulse Resp Temp SpO2   (!) 122/58 67 16 98.6 °F (37 °C) 98 %      MAP       --         Physical Exam    Nursing note and vitals reviewed.  Constitutional: He appears well-nourished.   HENT:   Head: Normocephalic and atraumatic.   Eyes: Conjunctivae and EOM are normal.   Neck: Normal range of motion. Neck supple. No thyroid mass present.   Cardiovascular: Normal rate, regular rhythm and normal heart sounds. Exam reveals no gallop and no friction rub.    No murmur heard.  Pulmonary/Chest: Breath sounds normal. He has no wheezes. He has no rhonchi. He has no rales.   Abdominal: Soft. Normal appearance and bowel sounds are normal. He exhibits no distension. There is tenderness. There is no rebound and no guarding.   L low flank pain radiating to the LLQ. No overlying skin changes.    Musculoskeletal: Normal range of motion. He exhibits no edema or tenderness.   Neurological: He is alert and oriented to person, place, and  time. He has normal strength. No cranial nerve deficit or sensory deficit.   Skin: Skin is warm and dry. No rash noted. No erythema.   Psychiatric: He has a normal mood and affect. His speech is normal. Cognition and memory are normal.         ED Course   Procedures  Labs Reviewed   CBC W/ AUTO DIFFERENTIAL - Abnormal; Notable for the following components:       Result Value    RBC 4.36 (*)     Hemoglobin 13.4 (*)     MPV 8.4 (*)     Lymph% 17.3 (*)     All other components within normal limits   COMPREHENSIVE METABOLIC PANEL   URINALYSIS, REFLEX TO URINE CULTURE   URINALYSIS MICROSCOPIC          Imaging Results          CT Renal Stone Study ABD Pelvis WO (In process)                  Medical Decision Making:   History:   Old Medical Records: I decided to obtain old medical records.  Clinical Tests:   Lab Tests: Reviewed and Ordered  Radiological Study: Ordered and Reviewed  ED Management:  This patient was interviewed and examined emergently.  Initial vital signs stable. He has a nonsurgical abdominal examination. I had initial suspicion the patient be progressing with ureteral colic secondary to a stone, but the patient's PE indicates left-sided pain that may be more associated with lumbago.  Labs found to be unremarkable.  CT scan does not indicate ureterolithiasis on the complaint side, however note is made of bilateral nephrolithiasis.  Additional note is made of multiple disc bulges at L4-L5, L5-S1.  Patient had improvement here with IV Toradol. He will be discharged with prescription for antispasmodic and analgesic medication.  He is asked to follow up with his primary care doctor, as well as, his urologist.  He is provided a strainer and is asked to strain urine and attempt to collect the stone prior to evaluation by his urologist.  He is asked to return to the ER for any new, concerning, or worsening symptoms.  Patient was agreeable with this plan for follow-up and was discharged in stable condition.             Scribe Attestation:   Scribe #1: I performed the above scribed service and the documentation accurately describes the services I performed. I attest to the accuracy of the note.               Clinical Impression:   The primary encounter diagnosis was Bilateral nephrolithiasis. A diagnosis of Bulging of lumbar intervertebral disc without myelopathy was also pertinent to this visit.      Disposition:   Disposition: Discharged  Condition: Stable         I, Dr. Jesus Vogel, personally performed the services described in this documentation. All medical record entries made by the scribe were at my direction and in my presence.  I have reviewed the chart and agree that the record reflects my personal performance and is accurate and complete. Jesus Vogel MD.  6:52 AM 02/17/2019           Jesus Vogel MD  02/17/19 0652

## 2019-02-25 ENCOUNTER — TELEPHONE (OUTPATIENT)
Dept: UROLOGY | Facility: CLINIC | Age: 64
End: 2019-02-25

## 2019-02-25 NOTE — TELEPHONE ENCOUNTER
Patient returned call, seen at [facility] [place] [place]- ER for bilateral kidney stones with the one on the left with obstruction, appt made to see the MD, directions given, patient verbally understood/

## 2019-02-25 NOTE — TELEPHONE ENCOUNTER
----- Message from Renae Higgins sent at 2/25/2019  9:40 AM CST -----  Contact: pt  Pt would like to be called back regarding getting a same day appt- kidney stones     Pt can be reached at [phone]

## 2019-02-26 ENCOUNTER — OFFICE VISIT (OUTPATIENT)
Dept: UROLOGY | Facility: CLINIC | Age: 64
End: 2019-02-26
Payer: COMMERCIAL

## 2019-02-26 ENCOUNTER — HOSPITAL ENCOUNTER (OUTPATIENT)
Dept: RADIOLOGY | Facility: HOSPITAL | Age: 64
Discharge: HOME | End: 2019-02-26
Attending: UROLOGY
Payer: COMMERCIAL

## 2019-02-26 VITALS
WEIGHT: 175.06 LBS | HEIGHT: 67 IN | SYSTOLIC BLOOD PRESSURE: 130 MMHG | BODY MASS INDEX: 27.48 KG/M2 | DIASTOLIC BLOOD PRESSURE: 83 MMHG | HEART RATE: 69 BPM | TEMPERATURE: 98 F | RESPIRATION RATE: 18 BRPM

## 2019-02-26 DIAGNOSIS — N20.0 CALCULUS OF KIDNEY: ICD-10-CM

## 2019-02-26 DIAGNOSIS — N20.0 CALCULUS OF KIDNEY: Primary | ICD-10-CM

## 2019-02-26 DIAGNOSIS — N20.1 URETERAL CALCULUS, LEFT: ICD-10-CM

## 2019-02-26 DIAGNOSIS — R10.9 LEFT FLANK PAIN: ICD-10-CM

## 2019-02-26 LAB
BILIRUB SERPL-MCNC: NORMAL MG/DL
BLOOD URINE, POC: NORMAL
COLOR, POC UA: YELLOW
GLUCOSE UR QL STRIP: NORMAL
KETONES UR QL STRIP: NORMAL
LEUKOCYTE ESTERASE URINE, POC: NORMAL
NITRITE, POC UA: NORMAL
PH, POC UA: 5.5
PROTEIN, POC: NORMAL
SPECIFIC GRAVITY, POC UA: 1.01
UROBILINOGEN, POC UA: NORMAL

## 2019-02-26 PROCEDURE — 3075F PR MOST RECENT SYSTOLIC BLOOD PRESS GE 130-139MM HG: ICD-10-PCS | Mod: CPTII,S$GLB,, | Performed by: UROLOGY

## 2019-02-26 PROCEDURE — 99204 PR OFFICE/OUTPT VISIT, NEW, LEVL IV, 45-59 MIN: ICD-10-PCS | Mod: 25,S$GLB,, | Performed by: UROLOGY

## 2019-02-26 PROCEDURE — 99204 OFFICE O/P NEW MOD 45 MIN: CPT | Mod: 25,S$GLB,, | Performed by: UROLOGY

## 2019-02-26 PROCEDURE — 81002 POCT URINE DIPSTICK WITHOUT MICROSCOPE: ICD-10-PCS | Mod: S$GLB,,, | Performed by: UROLOGY

## 2019-02-26 PROCEDURE — 3079F DIAST BP 80-89 MM HG: CPT | Mod: CPTII,S$GLB,, | Performed by: UROLOGY

## 2019-02-26 PROCEDURE — 3008F BODY MASS INDEX DOCD: CPT | Mod: CPTII,S$GLB,, | Performed by: UROLOGY

## 2019-02-26 PROCEDURE — 74018 XR ABDOMEN AP 1 VIEW: ICD-10-PCS | Mod: 26,,, | Performed by: RADIOLOGY

## 2019-02-26 PROCEDURE — 3075F SYST BP GE 130 - 139MM HG: CPT | Mod: CPTII,S$GLB,, | Performed by: UROLOGY

## 2019-02-26 PROCEDURE — 3008F PR BODY MASS INDEX (BMI) DOCUMENTED: ICD-10-PCS | Mod: CPTII,S$GLB,, | Performed by: UROLOGY

## 2019-02-26 PROCEDURE — 3079F PR MOST RECENT DIASTOLIC BLOOD PRESSURE 80-89 MM HG: ICD-10-PCS | Mod: CPTII,S$GLB,, | Performed by: UROLOGY

## 2019-02-26 PROCEDURE — 99999 PR PBB SHADOW E&M-EST. PATIENT-LVL IV: ICD-10-PCS | Mod: PBBFAC,,, | Performed by: UROLOGY

## 2019-02-26 PROCEDURE — 74018 RADEX ABDOMEN 1 VIEW: CPT | Mod: TC,FY

## 2019-02-26 PROCEDURE — 99999 PR PBB SHADOW E&M-EST. PATIENT-LVL IV: CPT | Mod: PBBFAC,,, | Performed by: UROLOGY

## 2019-02-26 PROCEDURE — 74018 RADEX ABDOMEN 1 VIEW: CPT | Mod: 26,,, | Performed by: RADIOLOGY

## 2019-02-26 PROCEDURE — 81002 URINALYSIS NONAUTO W/O SCOPE: CPT | Mod: S$GLB,,, | Performed by: UROLOGY

## 2019-02-26 RX ORDER — HYDROCODONE BITARTRATE AND ACETAMINOPHEN 5; 325 MG/1; MG/1
1 TABLET ORAL EVERY 6 HOURS PRN
Qty: 12 TABLET | Refills: 0 | Status: SHIPPED | OUTPATIENT
Start: 2019-02-26 | End: 2019-06-18

## 2019-02-26 RX ORDER — TAMSULOSIN HYDROCHLORIDE 0.4 MG/1
0.4 CAPSULE ORAL DAILY
Qty: 30 CAPSULE | Refills: 11 | Status: SHIPPED | OUTPATIENT
Start: 2019-02-26 | End: 2020-04-27

## 2019-02-26 RX ORDER — OMEPRAZOLE 20 MG/1
20 CAPSULE, DELAYED RELEASE ORAL DAILY
Refills: 2 | COMMUNITY
Start: 2019-01-05 | End: 2019-06-18 | Stop reason: SDUPTHER

## 2019-02-26 NOTE — PROGRESS NOTES
HISTORY AND PHYSICAL NEW PATIENT     AGE:  63.    DRUG ALLERGIES:  NKDA.    CHIEF COMPLAINT:  Left flank pain, urinary calculi.    HISTORY OF PRESENT ILLNESS:  This 63-year-old male was seen in the Emergency   Room on 02/17/2019 with acute onset of left flank pain.  CT scan was obtained   which revealed bilateral renal calculi and also a 6 mm proximal left ureteral   calculus with partial obstruction.  He is returning to us now for routine   recheck.  Overall, on today's visit, he states he is feeling much better and in   fact he passed some stones since he was seen in the Emergency Room, in fact even   experienced some pain on his right lower quadrant area in addition to the left   side.  He has not undergone any further imaging studies since he was seen in the   Emergency Room.  He does have a prior history of urinary calculi and the last   time he was visited with us with history of stones was in 2013.    Denies any other  history.    PAST MEDICAL HISTORY:  Hypertension, although no longer receiving any   medication; fatty liver; peptic ulcer disease; anxiety; hyperlipidemia;   herniated disk; and gout.    PAST SURGICAL HISTORY:  Dilatation of esophageal stricture, appendectomy,   vasectomy, left elbow surgery.    PRESENT MEDICATIONS:  Include Singulair, Mobic, Zyloprim, Ambien, Zocor,   Prilosec, Flonase, Celexa, and aspirin.    FAMILY HISTORY:  Negative.    SOCIAL HISTORY:  Works as a [employer].  , two healthy sons.  Tobacco,   none.  Alcohol, he quit two and a half years ago.    REVIEW OF SYMPTOMS:  GENERAL:  No weight change.  Normal appetite.  HEAD AND NECK:  No headaches.  Wears prescription glasses.  CARDIORESPIRATORY:  No chest pain, shortness of breath.  No cough.  GASTROINTESTINAL:  No trouble swallowing, constipation, or diarrhea.  MUSCULOSKELETAL:  Some chronic back and neck pain.  NEUROLOGIC:  No seizures.    It must also be noted that the patient is scheduled to go on a cruise this    coming Friday, which is 3 days from now.    PHYSICAL EXAMINATION:  VITAL SIGNS:  /83, pulse 69, temperature 98.2, respirations 18, weight   79.4 kg, height 5 feet 7 inches.  GENERAL:  This is a well-developed, well-nourished 63-year-old male, alert,   oriented, cooperative, in no acute distress.  HEAD AND NECK:  Throat clear.  No adenopathy.  CHEST:  Clear.  HEART:  Regular.  No murmur.  ABDOMEN:  Soft, benign.  There is no evidence of any flank tenderness on my   examination today, but there was some mild left lower quadrant tenderness.  No   guarding, rebound.  No masses.  EXTERNAL GENITALIA:  Normal phallus with adequate meatus.  Testes descended and   feel normal.  No scrotal masses.  RECTAL:  A 25 g smooth prostate.  No nodules.  Normal sphincter tone.    UA negative with pH 5.5.    FINAL IMPRESSION:  Renal calculi and left ureteral calculus.    RECOMMENDATION:  KUB, force p.o. fluids, strain urine, Flomax 0.4 mg p.o. daily   and Norco 5/325 p.r.n. for pain.  We will contact the patient with the KUB   report and notify him of his next appointment.  It was also discussed with him   since he is going on a cruise in several days, if there is a significant   high-grade obstruction or problems, consideration for possible cystoscopy and   stent placement prior to him going on his trip may need to be considered, but he   was leaning towards avoiding any surgical intervention.      SHAISTA  dd: 02/26/2019 13:14:23 (CST)  td: 02/26/2019 23:59:06 (CST)  Doc ID   #[id number]  Job ID #[id number]    CC:

## 2019-02-27 ENCOUNTER — TELEPHONE (OUTPATIENT)
Dept: UROLOGY | Facility: CLINIC | Age: 64
End: 2019-02-27

## 2019-02-27 NOTE — TELEPHONE ENCOUNTER
----- Message from Bhavana Pena sent at 2/27/2019  2:11 PM CST -----  Contact: Patient  Type:  Test Results    Who Called:  Patient  Name of Test (Lab/Mammo/Etc):  xray  Date of Test:  2/26/19  Ordering Provider:  Dr. Hartmann  Where the test was performed[facility]  Best Call Back Number:  [phone] (home)    Additional Information:  na

## 2019-02-27 NOTE — TELEPHONE ENCOUNTER
I spoke with the pt and advised him Dr Hartmann has not had the opportunity to review as of yet but we will call back with recommendations once he does. He agreed with that plan.

## 2019-03-12 ENCOUNTER — TELEPHONE (OUTPATIENT)
Dept: UROLOGY | Facility: CLINIC | Age: 64
End: 2019-03-12

## 2019-03-12 DIAGNOSIS — E78.2 MIXED HYPERLIPIDEMIA: ICD-10-CM

## 2019-03-12 NOTE — TELEPHONE ENCOUNTER
Returned call, patient will have wife bring stones to office for analysis, put on nurse visit, patient verbally understood.

## 2019-03-12 NOTE — TELEPHONE ENCOUNTER
----- Message from Sakina Metcalf sent at 3/12/2019 12:41 PM CDT -----  Type: Needs Medical Advice    Who Called:  Patient  Best Call Back Number: [phone]  Additional Information: Patient requesting to speak with nurse concerning bringing kidney stones to clinic/needs advice/please call patient back to advise.

## 2019-03-13 RX ORDER — SIMVASTATIN 10 MG/1
TABLET, FILM COATED ORAL
Qty: 90 TABLET | Refills: 0 | Status: SHIPPED | OUTPATIENT
Start: 2019-03-13 | End: 2019-03-19 | Stop reason: SDUPTHER

## 2019-03-14 ENCOUNTER — APPOINTMENT (OUTPATIENT)
Dept: LAB | Facility: HOSPITAL | Age: 64
End: 2019-03-14
Attending: UROLOGY
Payer: COMMERCIAL

## 2019-03-14 ENCOUNTER — CLINICAL SUPPORT (OUTPATIENT)
Dept: UROLOGY | Facility: CLINIC | Age: 64
End: 2019-03-14
Payer: COMMERCIAL

## 2019-03-14 DIAGNOSIS — N20.0 KIDNEY STONE: Primary | ICD-10-CM

## 2019-03-14 PROCEDURE — 82365 CALCULUS SPECTROSCOPY: CPT

## 2019-03-19 DIAGNOSIS — E78.2 MIXED HYPERLIPIDEMIA: ICD-10-CM

## 2019-03-19 LAB
ANNOTATION COMMENT IMP: NORMAL
COMPN STONE: NORMAL
SPECIMEN SOURCE: 1
STONE ANALYSIS IR-IMP: NORMAL

## 2019-03-19 RX ORDER — SIMVASTATIN 10 MG/1
10 TABLET, FILM COATED ORAL NIGHTLY
Qty: 90 TABLET | Refills: 3 | Status: SHIPPED | OUTPATIENT
Start: 2019-03-19 | End: 2019-06-18

## 2019-04-03 DIAGNOSIS — K21.9 GASTROESOPHAGEAL REFLUX DISEASE, ESOPHAGITIS PRESENCE NOT SPECIFIED: ICD-10-CM

## 2019-04-03 NOTE — PROGRESS NOTES
Refill Authorization Note     is requesting a refill authorization.    Brief assessment and rationale for refill: ROUTE: op   Name and strength of medication: omeprazole (PRILOSEC) 20 MG capsule       Medication Therapy Plan: GERD-nco    Medication reconciliation completed: No   Pharmacist Review Requested: Yes                     Comments:   Lab Results   Component Value Date    HGB 13.4 (L) 02/17/2019    HCT 40.7 02/17/2019     CBC Latest Ref Rng & Units 3/8/2017 12/15/2017 6/15/2018 12/7/2018   Hb 14.0 - 18.0 g/dL 13.2 (L) 13.1 (L) 12.4 (L) 13.2 (L)   Hct 40.0 - 54.0 % 41.2 38.7 (L) 36.0 (L) 39.8 (L)     2/17/2019   13.4 (L)   40.7     Lab Results   Component Value Date    WBC 9.00 02/17/2019    RBC 4.36 (L) 02/17/2019    MCV 93 02/17/2019     02/17/2019     No results found for: MG    Lab Results   Component Value Date    [id number] 07/26/2016       Appointments (past 12 m or future 3m authorizing provider)  LAST VISIT DATE  Jordana Anderson MD 6/19/2018         NEXT VISIT DATE  Jordana Anderson MD 6/18/2019

## 2019-04-05 RX ORDER — OMEPRAZOLE 20 MG/1
20 CAPSULE, DELAYED RELEASE ORAL DAILY
Qty: 90 CAPSULE | Refills: 0 | Status: SHIPPED | OUTPATIENT
Start: 2019-04-05 | End: 2019-07-23 | Stop reason: ALTCHOICE

## 2019-05-02 DIAGNOSIS — M10.9 GOUT, ARTHRITIS: ICD-10-CM

## 2019-05-02 RX ORDER — ALLOPURINOL 100 MG/1
200 TABLET ORAL DAILY
Qty: 60 TABLET | Refills: 5 | Status: SHIPPED | OUTPATIENT
Start: 2019-05-02 | End: 2019-06-18

## 2019-06-04 ENCOUNTER — PATIENT OUTREACH (OUTPATIENT)
Dept: ADMINISTRATIVE | Facility: HOSPITAL | Age: 64
End: 2019-06-04

## 2019-06-14 ENCOUNTER — LAB VISIT (OUTPATIENT)
Dept: LAB | Facility: HOSPITAL | Age: 64
End: 2019-06-14
Attending: NURSE PRACTITIONER
Payer: COMMERCIAL

## 2019-06-14 DIAGNOSIS — E78.2 MIXED HYPERLIPIDEMIA: ICD-10-CM

## 2019-06-14 DIAGNOSIS — I10 ESSENTIAL HYPERTENSION: ICD-10-CM

## 2019-06-14 LAB
ALBUMIN SERPL BCP-MCNC: 3.9 G/DL (ref 3.5–5.2)
ALP SERPL-CCNC: 50 U/L (ref 55–135)
ALT SERPL W/O P-5'-P-CCNC: 29 U/L (ref 10–44)
ANION GAP SERPL CALC-SCNC: 8 MMOL/L (ref 8–16)
AST SERPL-CCNC: 26 U/L (ref 10–40)
BASOPHILS # BLD AUTO: 0 K/UL (ref 0–0.2)
BASOPHILS NFR BLD: 0.7 % (ref 0–1.9)
BILIRUB SERPL-MCNC: 0.8 MG/DL (ref 0.1–1)
BUN SERPL-MCNC: 13 MG/DL (ref 8–23)
CALCIUM SERPL-MCNC: 9.8 MG/DL (ref 8.7–10.5)
CHLORIDE SERPL-SCNC: 106 MMOL/L (ref 95–110)
CHOLEST SERPL-MCNC: 167 MG/DL (ref 120–199)
CHOLEST/HDLC SERPL: 2.8 {RATIO} (ref 2–5)
CO2 SERPL-SCNC: 30 MMOL/L (ref 23–29)
CREAT SERPL-MCNC: 0.9 MG/DL (ref 0.5–1.4)
DIFFERENTIAL METHOD: ABNORMAL
EOSINOPHIL # BLD AUTO: 0.1 K/UL (ref 0–0.5)
EOSINOPHIL NFR BLD: 1.8 % (ref 0–8)
ERYTHROCYTE [DISTWIDTH] IN BLOOD BY AUTOMATED COUNT: 13.9 % (ref 11.5–14.5)
EST. GFR  (AFRICAN AMERICAN): >60 ML/MIN/1.73 M^2
EST. GFR  (NON AFRICAN AMERICAN): >60 ML/MIN/1.73 M^2
GLUCOSE SERPL-MCNC: 78 MG/DL (ref 70–110)
HCT VFR BLD AUTO: 38.3 % (ref 40–54)
HDLC SERPL-MCNC: 59 MG/DL (ref 40–75)
HDLC SERPL: 35.3 % (ref 20–50)
HGB BLD-MCNC: 13 G/DL (ref 14–18)
LDLC SERPL CALC-MCNC: 94.4 MG/DL (ref 63–159)
LYMPHOCYTES # BLD AUTO: 2.4 K/UL (ref 1–4.8)
LYMPHOCYTES NFR BLD: 35.1 % (ref 18–48)
MCH RBC QN AUTO: 31.5 PG (ref 27–31)
MCHC RBC AUTO-ENTMCNC: 33.9 G/DL (ref 32–36)
MCV RBC AUTO: 93 FL (ref 82–98)
MONOCYTES # BLD AUTO: 0.6 K/UL (ref 0.3–1)
MONOCYTES NFR BLD: 9 % (ref 4–15)
NEUTROPHILS # BLD AUTO: 3.6 K/UL (ref 1.8–7.7)
NEUTROPHILS NFR BLD: 53.4 % (ref 38–73)
NONHDLC SERPL-MCNC: 108 MG/DL
PLATELET # BLD AUTO: 196 K/UL (ref 150–350)
PMV BLD AUTO: 7.2 FL (ref 9.2–12.9)
POTASSIUM SERPL-SCNC: 4.2 MMOL/L (ref 3.5–5.1)
PROT SERPL-MCNC: 7 G/DL (ref 6–8.4)
RBC # BLD AUTO: 4.12 M/UL (ref 4.6–6.2)
SODIUM SERPL-SCNC: 144 MMOL/L (ref 136–145)
TRIGL SERPL-MCNC: 68 MG/DL (ref 30–150)
WBC # BLD AUTO: 6.8 K/UL (ref 3.9–12.7)

## 2019-06-14 PROCEDURE — 85025 COMPLETE CBC W/AUTO DIFF WBC: CPT

## 2019-06-14 PROCEDURE — 36415 COLL VENOUS BLD VENIPUNCTURE: CPT

## 2019-06-14 PROCEDURE — 80061 LIPID PANEL: CPT

## 2019-06-14 PROCEDURE — 80053 COMPREHEN METABOLIC PANEL: CPT

## 2019-06-18 ENCOUNTER — OFFICE VISIT (OUTPATIENT)
Dept: FAMILY MEDICINE | Facility: CLINIC | Age: 64
End: 2019-06-18
Payer: COMMERCIAL

## 2019-06-18 ENCOUNTER — HOSPITAL ENCOUNTER (OUTPATIENT)
Dept: RADIOLOGY | Facility: CLINIC | Age: 64
Discharge: HOME | End: 2019-06-18
Attending: FAMILY MEDICINE
Payer: COMMERCIAL

## 2019-06-18 ENCOUNTER — TELEPHONE (OUTPATIENT)
Dept: FAMILY MEDICINE | Facility: CLINIC | Age: 64
End: 2019-06-18

## 2019-06-18 ENCOUNTER — TELEPHONE (OUTPATIENT)
Dept: UROLOGY | Facility: CLINIC | Age: 64
End: 2019-06-18

## 2019-06-18 VITALS
DIASTOLIC BLOOD PRESSURE: 80 MMHG | HEART RATE: 59 BPM | BODY MASS INDEX: 27.09 KG/M2 | WEIGHT: 172.63 LBS | RESPIRATION RATE: 16 BRPM | SYSTOLIC BLOOD PRESSURE: 120 MMHG | OXYGEN SATURATION: 98 % | HEIGHT: 67 IN | TEMPERATURE: 59 F

## 2019-06-18 DIAGNOSIS — R31.9 HEMATURIA, UNSPECIFIED TYPE: ICD-10-CM

## 2019-06-18 DIAGNOSIS — N20.0 NEPHROLITHIASIS: ICD-10-CM

## 2019-06-18 DIAGNOSIS — Z23 NEED FOR SHINGLES VACCINE: ICD-10-CM

## 2019-06-18 DIAGNOSIS — M10.9 GOUT, ARTHRITIS: ICD-10-CM

## 2019-06-18 DIAGNOSIS — N20.0 KIDNEY CALCULI: ICD-10-CM

## 2019-06-18 DIAGNOSIS — R07.9 CHEST PAIN, UNSPECIFIED TYPE: ICD-10-CM

## 2019-06-18 DIAGNOSIS — I10 ESSENTIAL HYPERTENSION: ICD-10-CM

## 2019-06-18 DIAGNOSIS — D64.9 ANEMIA, UNSPECIFIED TYPE: ICD-10-CM

## 2019-06-18 DIAGNOSIS — Z23 NEED FOR PNEUMOCOCCAL VACCINE: ICD-10-CM

## 2019-06-18 DIAGNOSIS — F41.9 ANXIETY: ICD-10-CM

## 2019-06-18 DIAGNOSIS — E78.2 MIXED HYPERLIPIDEMIA: Primary | ICD-10-CM

## 2019-06-18 DIAGNOSIS — R07.9 ACUTE CHEST PAIN: ICD-10-CM

## 2019-06-18 PROCEDURE — 99999 PR PBB SHADOW E&M-EST. PATIENT-LVL V: CPT | Mod: PBBFAC,,, | Performed by: FAMILY MEDICINE

## 2019-06-18 PROCEDURE — 93010 ELECTROCARDIOGRAM REPORT: CPT | Mod: S$GLB,,, | Performed by: INTERNAL MEDICINE

## 2019-06-18 PROCEDURE — 3079F DIAST BP 80-89 MM HG: CPT | Mod: CPTII,S$GLB,, | Performed by: FAMILY MEDICINE

## 2019-06-18 PROCEDURE — 93010 EKG 12-LEAD: ICD-10-PCS | Mod: S$GLB,,, | Performed by: INTERNAL MEDICINE

## 2019-06-18 PROCEDURE — 99214 OFFICE O/P EST MOD 30 MIN: CPT | Mod: S$GLB,,, | Performed by: FAMILY MEDICINE

## 2019-06-18 PROCEDURE — 3074F PR MOST RECENT SYSTOLIC BLOOD PRESSURE < 130 MM HG: ICD-10-PCS | Mod: CPTII,S$GLB,, | Performed by: FAMILY MEDICINE

## 2019-06-18 PROCEDURE — 71046 X-RAY EXAM CHEST 2 VIEWS: CPT | Mod: 26,,, | Performed by: RADIOLOGY

## 2019-06-18 PROCEDURE — 3079F PR MOST RECENT DIASTOLIC BLOOD PRESSURE 80-89 MM HG: ICD-10-PCS | Mod: CPTII,S$GLB,, | Performed by: FAMILY MEDICINE

## 2019-06-18 PROCEDURE — 3074F SYST BP LT 130 MM HG: CPT | Mod: CPTII,S$GLB,, | Performed by: FAMILY MEDICINE

## 2019-06-18 PROCEDURE — 93005 EKG 12-LEAD: ICD-10-PCS | Mod: S$GLB,,, | Performed by: FAMILY MEDICINE

## 2019-06-18 PROCEDURE — 71046 X-RAY EXAM CHEST 2 VIEWS: CPT | Mod: TC,FY,PO

## 2019-06-18 PROCEDURE — 3008F BODY MASS INDEX DOCD: CPT | Mod: CPTII,S$GLB,, | Performed by: FAMILY MEDICINE

## 2019-06-18 PROCEDURE — 99999 PR PBB SHADOW E&M-EST. PATIENT-LVL V: ICD-10-PCS | Mod: PBBFAC,,, | Performed by: FAMILY MEDICINE

## 2019-06-18 PROCEDURE — 3008F PR BODY MASS INDEX (BMI) DOCUMENTED: ICD-10-PCS | Mod: CPTII,S$GLB,, | Performed by: FAMILY MEDICINE

## 2019-06-18 PROCEDURE — 99214 PR OFFICE/OUTPT VISIT, EST, LEVL IV, 30-39 MIN: ICD-10-PCS | Mod: S$GLB,,, | Performed by: FAMILY MEDICINE

## 2019-06-18 PROCEDURE — 93005 ELECTROCARDIOGRAM TRACING: CPT | Mod: S$GLB,,, | Performed by: FAMILY MEDICINE

## 2019-06-18 PROCEDURE — 71046 XR CHEST PA AND LATERAL: ICD-10-PCS | Mod: 26,,, | Performed by: RADIOLOGY

## 2019-06-18 NOTE — PROGRESS NOTES
0Chest Subjective:       Patient ID: Ang Oden Jr. is a 63 y.o. male.    Chief Complaint: Follow-up (6mth f/u hypertension)    HPI  Review of Systems   Constitutional: Negative for activity change.   Eyes: Negative for discharge.   Respiratory: Negative for wheezing.    Cardiovascular: Positive for chest pain. Negative for palpitations.   Gastrointestinal: Negative for constipation, diarrhea and vomiting.   Genitourinary: Positive for difficulty urinating and hematuria.   Neurological: Negative for headaches.   Psychiatric/Behavioral: Negative for dysphoric mood.       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    Reviewed labs 6/19- mild stable anemia, lipids and cmp at goal. Uric acid 5.2 12/18    Urology Dr. Hartmann following kidney stones, BPH-last psa 12/18 .46.  Had kidney stone attack left side 2/19 and started on flomax, norco and has been passing several stones.  Left flank pain better but has been passing blood     13 lbs down since 12/18 on keto diet    C/o left sided chest pain that is intermittent and usually resting.  No exertional sx. No radiation, sob or nausea.  No gi sx. No relation to meals  Objective:      Physical Exam   Constitutional: He is oriented to person, place, and time. He appears well-developed and well-nourished.   Cardiovascular: Normal rate, regular rhythm and normal heart sounds.    Pulmonary/Chest: Effort normal and breath sounds normal.   Musculoskeletal: He exhibits no edema.   Neurological: He is alert and oriented to person, place, and time.   Skin: Skin is warm and dry.   Psychiatric: He has a normal mood and affect.   Nursing note and vitals reviewed.   ecg sinus stephen 56, otherwise normal ECG   Assessment:       1. Mixed hyperlipidemia    2. Essential hypertension    3. Anemia, unspecified type    4. Gout, arthritis    5. Kidney calculi    6. Chest pain, unspecified type    7. Need for pneumococcal vaccine    8. Need for shingles vaccine    9. Hematuria, unspecified type    10. Nephrolithiasis    11. Acute chest pain    12. Anxiety        Plan:         1. Mixed hyperlipidemia  Controlled on current medications.  Continue current medications.  Trial off zocor  - Comprehensive metabolic panel; Future  - Lipid panel; Future    2. Essential hypertension  Controlled on current medications.  Continue current medications.      3. Anemia, unspecified type  Mild, stable and chronic-may be his normal physiology    4. Gout, arthritis  Controlled since he stopped driniking etoh 3 years ago. Trial off allopurinol  - CBC auto differential; Future  - Uric acid; Future    5. Kidney calculi  Cont urology mgmt    6. Chest pain, unspecified type  Atypical but has rf. Work up  - X-Ray Chest PA And Lateral; Future  - IN OFFICE EKG 12-LEAD (to Muse)  - Nuc Tread Stress test - Radiologist interpreted; Future  - NM Myocardial Perfusion Spect Multi Exer; Future    7. Need for pneumococcal vaccine  declined    8. Need for shingles vaccine  declined    9. Hematuria, unspecified type  Cont urology mgmt  - Ambulatory referral to Urology    10. Nephrolithiasis  Cont urology mgmt  - Ambulatory referral to Urology    11. Acute chest pain  See above       12. Anxiety  Doing well. Trial off celexa    Time spent with patient: 20 minutes    Patient with be reevaluated in 6 months or sooner prn    Greater than 50% of  this visit was spent counseling as described in above documentation:Yes

## 2019-06-18 NOTE — TELEPHONE ENCOUNTER
The patient states he forgot to talk to you about his anemia. He states he wants to do something to feel better and have more energy. I told him I would send you a message and call him back tomorrow.

## 2019-06-18 NOTE — TELEPHONE ENCOUNTER
----- Message from Maribell Briscoe sent at 6/18/2019  4:43 PM CDT -----  Pt needs an appt for Hematuria, unspecified type  Nephrolithiasis  Just saw dr malone in [place], no appts availble till august  Please call him @ [phone]  Thanks !

## 2019-06-19 ENCOUNTER — TELEPHONE (OUTPATIENT)
Dept: UROLOGY | Facility: CLINIC | Age: 64
End: 2019-06-19

## 2019-06-19 DIAGNOSIS — N20.0 KIDNEY STONE: Primary | ICD-10-CM

## 2019-06-19 NOTE — TELEPHONE ENCOUNTER
----- Message from Noreen Newsome sent at 6/19/2019  8:17 AM CDT -----  Contact: self  Type:  Patient Returning Call    Who Called:  self  Who Left Message for Patient:  Mariluz  Does the patient know what this is regarding?:  yes  Best Call Back Number:  [phone]  Additional Information:  Patient returning call from last night to schedule an appointment. Please call patient.  Thanks!

## 2019-06-19 NOTE — TELEPHONE ENCOUNTER
Patient returned call, needed follow up appt for kidney stones, stated he also recently passed some more stones. appt made and informed to have KUB before the appt, patient verbally understood.

## 2019-06-23 NOTE — TELEPHONE ENCOUNTER
Notify patient: Anemia is chronic and very mild.  I would be more suspicious of other causes of low energy.  Most people are not symptomatic from this level of slight anemia. Perhaps we should consider testosterone deficiency, KYLEIGH, mood disturbance or other issues. Please make appt to discuss screenings with me or RONNIE Kolb

## 2019-06-24 ENCOUNTER — TELEPHONE (OUTPATIENT)
Dept: UROLOGY | Facility: CLINIC | Age: 64
End: 2019-06-24

## 2019-06-24 NOTE — TELEPHONE ENCOUNTER
Notified patient and he states he will address this at his next appointment. He states he has not been taking his iron and he has not been using his CPAP machine so that's likely the cause of his fatigue.

## 2019-06-24 NOTE — TELEPHONE ENCOUNTER
Returned call and spoke with patient, states today he is having some blood in his urine and feels like a stone could be moving. Advised to go to the [facility] for evaluation and Dr Hartmann is on call. They will contact him for advisement. Patient will do, Patient verbally understood.

## 2019-06-24 NOTE — TELEPHONE ENCOUNTER
----- Message from Chandler Ocasio sent at 6/24/2019 12:40 PM CDT -----  Type:  Sooner Apoointment Request    Caller is requesting a sooner appointment.  Caller declined first available appointment listed below.  Caller will not accept being placed on the waitlist and is requesting a message be sent to doctor.    Name of Caller: self When is the first available appointment?   Symptoms:  Kidney stone problemBe Call Back Number:  [phone]  Additional Information:  Patient asking to be seen tomorrow for kidney stone problem.

## 2019-06-25 ENCOUNTER — HOSPITAL ENCOUNTER (OUTPATIENT)
Dept: CARDIOLOGY | Facility: HOSPITAL | Age: 64
Discharge: HOME | End: 2019-06-25
Attending: FAMILY MEDICINE
Payer: COMMERCIAL

## 2019-06-25 ENCOUNTER — HOSPITAL ENCOUNTER (OUTPATIENT)
Dept: RADIOLOGY | Facility: HOSPITAL | Age: 64
Discharge: HOME | End: 2019-06-25
Attending: FAMILY MEDICINE
Payer: COMMERCIAL

## 2019-06-25 VITALS — HEART RATE: 57 BPM | RESPIRATION RATE: 12 BRPM | SYSTOLIC BLOOD PRESSURE: 111 MMHG | DIASTOLIC BLOOD PRESSURE: 87 MMHG

## 2019-06-25 DIAGNOSIS — R07.9 CHEST PAIN, UNSPECIFIED TYPE: ICD-10-CM

## 2019-06-25 LAB
CV STRESS BASE HR: 58 BPM
DIASTOLIC BLOOD PRESSURE: 87 MMHG
OHS CV CPX 85 PERCENT MAX PREDICTED HEART RATE MALE: 133
OHS CV CPX ESTIMATED METS: 11
OHS CV CPX MAX PREDICTED HEART RATE: 156
OHS CV CPX PATIENT IS FEMALE: 0
OHS CV CPX PATIENT IS MALE: 1
OHS CV CPX PEAK DIASTOLIC BLOOD PRESSURE: 76 MMHG
OHS CV CPX PEAK HEAR RATE: 139 BPM
OHS CV CPX PEAK RATE PRESSURE PRODUCT: NORMAL
OHS CV CPX PEAK SYSTOLIC BLOOD PRESSURE: 184 MMHG
OHS CV CPX PERCENT MAX PREDICTED HEART RATE ACHIEVED: 89
OHS CV CPX RATE PRESSURE PRODUCT PRESENTING: 6438
STRESS ANGINA INDEX: 0
STRESS ECHO POST EXERCISE DUR MIN: 9 MINUTES
STRESS ECHO POST EXERCISE DUR SEC: 31 SECONDS
STRESS ECHO TARGET HR: 132.6 BPM
SYSTOLIC BLOOD PRESSURE: 111 MMHG

## 2019-06-25 PROCEDURE — A9502 TC99M TETROFOSMIN: HCPCS

## 2019-06-25 PROCEDURE — 93017 CV STRESS TEST TRACING ONLY: CPT

## 2019-06-25 PROCEDURE — 78452 NM MYOCARDIAL PERFUSION SPECT MULTI STUDY: ICD-10-PCS | Mod: 26,,, | Performed by: RADIOLOGY

## 2019-06-25 PROCEDURE — 78452 HT MUSCLE IMAGE SPECT MULT: CPT | Mod: 26,,, | Performed by: RADIOLOGY

## 2019-07-18 ENCOUNTER — HOSPITAL ENCOUNTER (OUTPATIENT)
Dept: RADIOLOGY | Facility: HOSPITAL | Age: 64
Discharge: HOME | End: 2019-07-18
Attending: UROLOGY
Payer: COMMERCIAL

## 2019-07-18 DIAGNOSIS — N20.0 KIDNEY STONE: ICD-10-CM

## 2019-07-18 PROCEDURE — 74018 RADEX ABDOMEN 1 VIEW: CPT | Mod: 26,,, | Performed by: RADIOLOGY

## 2019-07-18 PROCEDURE — 74018 XR ABDOMEN AP 1 VIEW: ICD-10-PCS | Mod: 26,,, | Performed by: RADIOLOGY

## 2019-07-18 PROCEDURE — 74018 RADEX ABDOMEN 1 VIEW: CPT | Mod: TC,FY

## 2019-07-23 ENCOUNTER — OFFICE VISIT (OUTPATIENT)
Dept: UROLOGY | Facility: CLINIC | Age: 64
End: 2019-07-23
Payer: COMMERCIAL

## 2019-07-23 VITALS
BODY MASS INDEX: 27.27 KG/M2 | HEIGHT: 67 IN | WEIGHT: 173.75 LBS | HEART RATE: 68 BPM | TEMPERATURE: 99 F | RESPIRATION RATE: 18 BRPM | SYSTOLIC BLOOD PRESSURE: 128 MMHG | DIASTOLIC BLOOD PRESSURE: 78 MMHG

## 2019-07-23 DIAGNOSIS — N20.0 KIDNEY STONE: Primary | ICD-10-CM

## 2019-07-23 LAB
BILIRUB SERPL-MCNC: NORMAL MG/DL
BLOOD URINE, POC: NORMAL
COLOR, POC UA: YELLOW
GLUCOSE UR QL STRIP: NORMAL
KETONES UR QL STRIP: NORMAL
LEUKOCYTE ESTERASE URINE, POC: NORMAL
NITRITE, POC UA: NORMAL
PH, POC UA: 6
PROTEIN, POC: NORMAL
SPECIFIC GRAVITY, POC UA: 1.02
UROBILINOGEN, POC UA: NORMAL

## 2019-07-23 PROCEDURE — 81002 POCT URINE DIPSTICK WITHOUT MICROSCOPE: ICD-10-PCS | Mod: S$GLB,,, | Performed by: UROLOGY

## 2019-07-23 PROCEDURE — 3074F PR MOST RECENT SYSTOLIC BLOOD PRESSURE < 130 MM HG: ICD-10-PCS | Mod: CPTII,S$GLB,, | Performed by: UROLOGY

## 2019-07-23 PROCEDURE — 81002 URINALYSIS NONAUTO W/O SCOPE: CPT | Mod: S$GLB,,, | Performed by: UROLOGY

## 2019-07-23 PROCEDURE — 99214 PR OFFICE/OUTPT VISIT, EST, LEVL IV, 30-39 MIN: ICD-10-PCS | Mod: 25,S$GLB,, | Performed by: UROLOGY

## 2019-07-23 PROCEDURE — 99999 PR PBB SHADOW E&M-EST. PATIENT-LVL III: CPT | Mod: PBBFAC,,, | Performed by: UROLOGY

## 2019-07-23 PROCEDURE — 3074F SYST BP LT 130 MM HG: CPT | Mod: CPTII,S$GLB,, | Performed by: UROLOGY

## 2019-07-23 PROCEDURE — 99999 PR PBB SHADOW E&M-EST. PATIENT-LVL III: ICD-10-PCS | Mod: PBBFAC,,, | Performed by: UROLOGY

## 2019-07-23 PROCEDURE — 3078F PR MOST RECENT DIASTOLIC BLOOD PRESSURE < 80 MM HG: ICD-10-PCS | Mod: CPTII,S$GLB,, | Performed by: UROLOGY

## 2019-07-23 PROCEDURE — 3008F BODY MASS INDEX DOCD: CPT | Mod: CPTII,S$GLB,, | Performed by: UROLOGY

## 2019-07-23 PROCEDURE — 3078F DIAST BP <80 MM HG: CPT | Mod: CPTII,S$GLB,, | Performed by: UROLOGY

## 2019-07-23 PROCEDURE — 99214 OFFICE O/P EST MOD 30 MIN: CPT | Mod: 25,S$GLB,, | Performed by: UROLOGY

## 2019-07-23 PROCEDURE — 3008F PR BODY MASS INDEX (BMI) DOCUMENTED: ICD-10-PCS | Mod: CPTII,S$GLB,, | Performed by: UROLOGY

## 2019-07-23 RX ORDER — HYDROCODONE BITARTRATE AND ACETAMINOPHEN 5; 325 MG/1; MG/1
1 TABLET ORAL EVERY 6 HOURS PRN
Qty: 20 TABLET | Refills: 0 | Status: SHIPPED | OUTPATIENT
Start: 2019-07-23 | End: 2019-09-17 | Stop reason: ALTCHOICE

## 2019-07-23 NOTE — PROGRESS NOTES
OFFICE NOTE  [unfilled]  [id number]  7/23/2019       CHIEF COMPLAINT:   Renal calculi     HISTORY OF PRESENT ILLNESS:   This 63-year-old male returns for routine recheck.  He has a history of urinary calculi and he recently has passed some stones approximately 3 weeks ago while he was on a cruise.  Overall he is doing better but has been experiencing some pain in the right lower quadrant over the past 3-4 days.  He had brought in some stones that he passed since his last visit and stone analysis revealed them to be calcium oxalate.  KUB on 07/18/2019 revealed bilateral renal calculi but decreased in number compared to prior  imaging studies.  No ureteral stones are seen.     Physical exam:  Abdomen - soft benign no mass no hernia no tenderness                             External genitalia - normal phallus with adequate meatus testes descended and feel normal no hernias         PSA 0.46 on 12/07/2018     UA - negative with pH 6.0     FINAL IMPRESSION:  Renal calculi       RECOMMENDATIONS:   24 hr urine stone workup.  Norco 5/325 for pain .  Will contact patient when we receive the 24 hr urine stone workup.

## 2019-08-09 ENCOUNTER — TELEPHONE (OUTPATIENT)
Dept: UROLOGY | Facility: CLINIC | Age: 64
End: 2019-08-09

## 2019-08-09 NOTE — TELEPHONE ENCOUNTER
Spoke with patient, informed we got his litholinks results, and calling to make appt for follow up to discuss. appt made, patient verbally understood.

## 2019-09-17 ENCOUNTER — HOSPITAL ENCOUNTER (OUTPATIENT)
Dept: RADIOLOGY | Facility: HOSPITAL | Age: 64
Discharge: HOME | End: 2019-09-17
Attending: UROLOGY
Payer: COMMERCIAL

## 2019-09-17 ENCOUNTER — OFFICE VISIT (OUTPATIENT)
Dept: UROLOGY | Facility: CLINIC | Age: 64
End: 2019-09-17
Payer: COMMERCIAL

## 2019-09-17 VITALS
WEIGHT: 173.75 LBS | SYSTOLIC BLOOD PRESSURE: 118 MMHG | HEIGHT: 67 IN | BODY MASS INDEX: 27.27 KG/M2 | TEMPERATURE: 99 F | DIASTOLIC BLOOD PRESSURE: 77 MMHG | HEART RATE: 66 BPM | RESPIRATION RATE: 18 BRPM

## 2019-09-17 DIAGNOSIS — N20.0 KIDNEY STONE: ICD-10-CM

## 2019-09-17 DIAGNOSIS — N20.0 KIDNEY STONE: Primary | ICD-10-CM

## 2019-09-17 PROCEDURE — 74018 RADEX ABDOMEN 1 VIEW: CPT | Mod: TC,FY

## 2019-09-17 PROCEDURE — 99999 PR PBB SHADOW E&M-EST. PATIENT-LVL III: ICD-10-PCS | Mod: PBBFAC,,, | Performed by: UROLOGY

## 2019-09-17 PROCEDURE — 3008F PR BODY MASS INDEX (BMI) DOCUMENTED: ICD-10-PCS | Mod: CPTII,S$GLB,, | Performed by: UROLOGY

## 2019-09-17 PROCEDURE — 99214 PR OFFICE/OUTPT VISIT, EST, LEVL IV, 30-39 MIN: ICD-10-PCS | Mod: S$GLB,,, | Performed by: UROLOGY

## 2019-09-17 PROCEDURE — 99214 OFFICE O/P EST MOD 30 MIN: CPT | Mod: S$GLB,,, | Performed by: UROLOGY

## 2019-09-17 PROCEDURE — 74018 XR ABDOMEN AP 1 VIEW: ICD-10-PCS | Mod: 26,,, | Performed by: RADIOLOGY

## 2019-09-17 PROCEDURE — 3078F DIAST BP <80 MM HG: CPT | Mod: CPTII,S$GLB,, | Performed by: UROLOGY

## 2019-09-17 PROCEDURE — 3074F PR MOST RECENT SYSTOLIC BLOOD PRESSURE < 130 MM HG: ICD-10-PCS | Mod: CPTII,S$GLB,, | Performed by: UROLOGY

## 2019-09-17 PROCEDURE — 99999 PR PBB SHADOW E&M-EST. PATIENT-LVL III: CPT | Mod: PBBFAC,,, | Performed by: UROLOGY

## 2019-09-17 PROCEDURE — 3074F SYST BP LT 130 MM HG: CPT | Mod: CPTII,S$GLB,, | Performed by: UROLOGY

## 2019-09-17 PROCEDURE — 3078F PR MOST RECENT DIASTOLIC BLOOD PRESSURE < 80 MM HG: ICD-10-PCS | Mod: CPTII,S$GLB,, | Performed by: UROLOGY

## 2019-09-17 PROCEDURE — 3008F BODY MASS INDEX DOCD: CPT | Mod: CPTII,S$GLB,, | Performed by: UROLOGY

## 2019-09-17 PROCEDURE — 74018 RADEX ABDOMEN 1 VIEW: CPT | Mod: 26,,, | Performed by: RADIOLOGY

## 2019-09-17 RX ORDER — HYDROCODONE BITARTRATE AND ACETAMINOPHEN 5; 325 MG/1; MG/1
1 TABLET ORAL EVERY 6 HOURS PRN
Qty: 20 TABLET | Refills: 0 | Status: SHIPPED | OUTPATIENT
Start: 2019-09-17 | End: 2020-01-07

## 2020-01-03 ENCOUNTER — LAB VISIT (OUTPATIENT)
Dept: LAB | Facility: HOSPITAL | Age: 65
End: 2020-01-03
Attending: FAMILY MEDICINE
Payer: COMMERCIAL

## 2020-01-03 DIAGNOSIS — M10.9 GOUT, ARTHRITIS: ICD-10-CM

## 2020-01-03 DIAGNOSIS — E78.2 MIXED HYPERLIPIDEMIA: ICD-10-CM

## 2020-01-03 LAB
ALBUMIN SERPL BCP-MCNC: 3.8 G/DL (ref 3.5–5.2)
ALP SERPL-CCNC: 65 U/L (ref 55–135)
ALT SERPL W/O P-5'-P-CCNC: 18 U/L (ref 10–44)
ANION GAP SERPL CALC-SCNC: 8 MMOL/L (ref 8–16)
AST SERPL-CCNC: 19 U/L (ref 10–40)
BASOPHILS # BLD AUTO: 0.04 K/UL (ref 0–0.2)
BASOPHILS NFR BLD: 0.5 % (ref 0–1.9)
BILIRUB SERPL-MCNC: 1 MG/DL (ref 0.1–1)
BUN SERPL-MCNC: 10 MG/DL (ref 8–23)
CALCIUM SERPL-MCNC: 9.5 MG/DL (ref 8.7–10.5)
CHLORIDE SERPL-SCNC: 102 MMOL/L (ref 95–110)
CHOLEST SERPL-MCNC: 232 MG/DL (ref 120–199)
CHOLEST/HDLC SERPL: 4.1 {RATIO} (ref 2–5)
CO2 SERPL-SCNC: 28 MMOL/L (ref 23–29)
CREAT SERPL-MCNC: 1 MG/DL (ref 0.5–1.4)
DIFFERENTIAL METHOD: ABNORMAL
EOSINOPHIL # BLD AUTO: 0.2 K/UL (ref 0–0.5)
EOSINOPHIL NFR BLD: 2.2 % (ref 0–8)
ERYTHROCYTE [DISTWIDTH] IN BLOOD BY AUTOMATED COUNT: 12.8 % (ref 11.5–14.5)
EST. GFR  (AFRICAN AMERICAN): >60 ML/MIN/1.73 M^2
EST. GFR  (NON AFRICAN AMERICAN): >60 ML/MIN/1.73 M^2
GLUCOSE SERPL-MCNC: 75 MG/DL (ref 70–110)
HCT VFR BLD AUTO: 41.3 % (ref 40–54)
HDLC SERPL-MCNC: 56 MG/DL (ref 40–75)
HDLC SERPL: 24.1 % (ref 20–50)
HGB BLD-MCNC: 13.9 G/DL (ref 14–18)
IMM GRANULOCYTES # BLD AUTO: 0.01 K/UL (ref 0–0.04)
LDLC SERPL CALC-MCNC: 155.6 MG/DL (ref 63–159)
LYMPHOCYTES # BLD AUTO: 2 K/UL (ref 1–4.8)
LYMPHOCYTES NFR BLD: 24.3 % (ref 18–48)
MCH RBC QN AUTO: 30.9 PG (ref 27–31)
MCHC RBC AUTO-ENTMCNC: 33.7 G/DL (ref 32–36)
MCV RBC AUTO: 92 FL (ref 82–98)
MONOCYTES # BLD AUTO: 1 K/UL (ref 0.3–1)
MONOCYTES NFR BLD: 11.8 % (ref 4–15)
NEUTROPHILS # BLD AUTO: 5.1 K/UL (ref 1.8–7.7)
NEUTROPHILS NFR BLD: 61.1 % (ref 38–73)
NONHDLC SERPL-MCNC: 176 MG/DL
NRBC BLD-RTO: 0 /100 WBC
PLATELET # BLD AUTO: 184 K/UL (ref 150–350)
PMV BLD AUTO: 9 FL (ref 9.2–12.9)
POTASSIUM SERPL-SCNC: 3.5 MMOL/L (ref 3.5–5.1)
PROT SERPL-MCNC: 7.4 G/DL (ref 6–8.4)
RBC # BLD AUTO: 4.5 M/UL (ref 4.6–6.2)
SODIUM SERPL-SCNC: 138 MMOL/L (ref 136–145)
TRIGL SERPL-MCNC: 102 MG/DL (ref 30–150)
URATE SERPL-MCNC: 7.1 MG/DL (ref 3.4–7)
WBC # BLD AUTO: 8.32 K/UL (ref 3.9–12.7)

## 2020-01-03 PROCEDURE — 80061 LIPID PANEL: CPT

## 2020-01-03 PROCEDURE — 85025 COMPLETE CBC W/AUTO DIFF WBC: CPT

## 2020-01-03 PROCEDURE — 80053 COMPREHEN METABOLIC PANEL: CPT

## 2020-01-03 PROCEDURE — 36415 COLL VENOUS BLD VENIPUNCTURE: CPT

## 2020-01-03 PROCEDURE — 84550 ASSAY OF BLOOD/URIC ACID: CPT

## 2020-01-07 ENCOUNTER — HOSPITAL ENCOUNTER (OUTPATIENT)
Dept: RADIOLOGY | Facility: HOSPITAL | Age: 65
Discharge: HOME | End: 2020-01-07
Attending: UROLOGY
Payer: COMMERCIAL

## 2020-01-07 ENCOUNTER — OFFICE VISIT (OUTPATIENT)
Dept: UROLOGY | Facility: CLINIC | Age: 65
End: 2020-01-07
Payer: COMMERCIAL

## 2020-01-07 VITALS
SYSTOLIC BLOOD PRESSURE: 131 MMHG | HEART RATE: 68 BPM | HEIGHT: 67 IN | TEMPERATURE: 98 F | BODY MASS INDEX: 28.55 KG/M2 | DIASTOLIC BLOOD PRESSURE: 87 MMHG | RESPIRATION RATE: 18 BRPM | WEIGHT: 181.88 LBS

## 2020-01-07 DIAGNOSIS — N20.0 KIDNEY STONE: ICD-10-CM

## 2020-01-07 DIAGNOSIS — R31.29 MICROSCOPIC HEMATURIA: ICD-10-CM

## 2020-01-07 DIAGNOSIS — N40.0 BENIGN PROSTATIC HYPERPLASIA WITHOUT LOWER URINARY TRACT SYMPTOMS: ICD-10-CM

## 2020-01-07 DIAGNOSIS — N20.0 KIDNEY STONE: Primary | ICD-10-CM

## 2020-01-07 LAB
BILIRUB SERPL-MCNC: ABNORMAL MG/DL
BLOOD URINE, POC: ABNORMAL
COLOR, POC UA: YELLOW
GLUCOSE UR QL STRIP: ABNORMAL
KETONES UR QL STRIP: ABNORMAL
LEUKOCYTE ESTERASE URINE, POC: ABNORMAL
NITRITE, POC UA: ABNORMAL
PH, POC UA: 5
PROTEIN, POC: ABNORMAL
SPECIFIC GRAVITY, POC UA: 1.02
UROBILINOGEN, POC UA: ABNORMAL

## 2020-01-07 PROCEDURE — 1101F PR PT FALLS ASSESS DOC 0-1 FALLS W/OUT INJ PAST YR: ICD-10-PCS | Mod: CPTII,S$GLB,, | Performed by: UROLOGY

## 2020-01-07 PROCEDURE — 99999 PR PBB SHADOW E&M-EST. PATIENT-LVL IV: CPT | Mod: PBBFAC,,, | Performed by: UROLOGY

## 2020-01-07 PROCEDURE — 74176 CT ABD & PELVIS W/O CONTRAST: CPT | Mod: TC

## 2020-01-07 PROCEDURE — 81002 POCT URINE DIPSTICK WITHOUT MICROSCOPE: ICD-10-PCS | Mod: S$GLB,,, | Performed by: UROLOGY

## 2020-01-07 PROCEDURE — 99214 PR OFFICE/OUTPT VISIT, EST, LEVL IV, 30-39 MIN: ICD-10-PCS | Mod: 25,S$GLB,, | Performed by: UROLOGY

## 2020-01-07 PROCEDURE — 88112 CYTOPATH CELL ENHANCE TECH: CPT | Mod: 26,,, | Performed by: PATHOLOGY

## 2020-01-07 PROCEDURE — 74018 RADEX ABDOMEN 1 VIEW: CPT | Mod: TC,FY

## 2020-01-07 PROCEDURE — 74018 XR ABDOMEN AP 1 VIEW: ICD-10-PCS | Mod: 26,,, | Performed by: RADIOLOGY

## 2020-01-07 PROCEDURE — 74176 CT RENAL STONE STUDY ABD PELVIS WO: ICD-10-PCS | Mod: 26,,, | Performed by: RADIOLOGY

## 2020-01-07 PROCEDURE — 87086 URINE CULTURE/COLONY COUNT: CPT

## 2020-01-07 PROCEDURE — 99999 PR PBB SHADOW E&M-EST. PATIENT-LVL IV: ICD-10-PCS | Mod: PBBFAC,,, | Performed by: UROLOGY

## 2020-01-07 PROCEDURE — 3008F BODY MASS INDEX DOCD: CPT | Mod: CPTII,S$GLB,, | Performed by: UROLOGY

## 2020-01-07 PROCEDURE — 88112 CYTOPATH CELL ENHANCE TECH: CPT | Performed by: PATHOLOGY

## 2020-01-07 PROCEDURE — 3079F PR MOST RECENT DIASTOLIC BLOOD PRESSURE 80-89 MM HG: ICD-10-PCS | Mod: CPTII,S$GLB,, | Performed by: UROLOGY

## 2020-01-07 PROCEDURE — 88112 PR  CYTOPATH, CELL ENHANCE TECH: ICD-10-PCS | Mod: 26,,, | Performed by: PATHOLOGY

## 2020-01-07 PROCEDURE — 3075F PR MOST RECENT SYSTOLIC BLOOD PRESS GE 130-139MM HG: ICD-10-PCS | Mod: CPTII,S$GLB,, | Performed by: UROLOGY

## 2020-01-07 PROCEDURE — 3079F DIAST BP 80-89 MM HG: CPT | Mod: CPTII,S$GLB,, | Performed by: UROLOGY

## 2020-01-07 PROCEDURE — 74018 RADEX ABDOMEN 1 VIEW: CPT | Mod: 26,,, | Performed by: RADIOLOGY

## 2020-01-07 PROCEDURE — 81002 URINALYSIS NONAUTO W/O SCOPE: CPT | Mod: S$GLB,,, | Performed by: UROLOGY

## 2020-01-07 PROCEDURE — 3075F SYST BP GE 130 - 139MM HG: CPT | Mod: CPTII,S$GLB,, | Performed by: UROLOGY

## 2020-01-07 PROCEDURE — 3008F PR BODY MASS INDEX (BMI) DOCUMENTED: ICD-10-PCS | Mod: CPTII,S$GLB,, | Performed by: UROLOGY

## 2020-01-07 PROCEDURE — 1101F PT FALLS ASSESS-DOCD LE1/YR: CPT | Mod: CPTII,S$GLB,, | Performed by: UROLOGY

## 2020-01-07 PROCEDURE — 99214 OFFICE O/P EST MOD 30 MIN: CPT | Mod: 25,S$GLB,, | Performed by: UROLOGY

## 2020-01-07 PROCEDURE — 74176 CT ABD & PELVIS W/O CONTRAST: CPT | Mod: 26,,, | Performed by: RADIOLOGY

## 2020-01-07 RX ORDER — HYDROCODONE BITARTRATE AND ACETAMINOPHEN 5; 325 MG/1; MG/1
1 TABLET ORAL EVERY 6 HOURS PRN
Qty: 20 TABLET | Refills: 0 | Status: SHIPPED | OUTPATIENT
Start: 2020-01-07 | End: 2020-07-14 | Stop reason: ALTCHOICE

## 2020-01-07 RX ORDER — MONTELUKAST SODIUM 10 MG/1
10 TABLET ORAL NIGHTLY
COMMUNITY
End: 2020-01-13 | Stop reason: SDUPTHER

## 2020-01-07 NOTE — PROGRESS NOTES
OFFICE NOTE  [unfilled]  [id number]  1/7/2020       CHIEF COMPLAINT:   renal calculi     HISTORY OF PRESENT ILLNESS:   this 64-year-old male returns routine recheck.  He has history renal calculi and he continues to pass stones occasionally.  Overall is doing well and on today's visit denies any pain.  He was recently noted to have a rise in his serum uric acid to 7.1 with normal range between 3.4 and 7.0.  He also had been noted to have some elevation of his urine uric acid when we last saw him on 09/2019.    Medical history of the reveals he has been experiencing some sinus infections over the past week    Physical exam:  Abdomen - soft benign nontender no masses no hernias no organomegaly                             External genitalia - normal phallus with adequate meatus testes descended and feel normal no scrotal mass                             Rectal - 25 g smooth prostate no nodules normal sphincter tone         PSA  - 0.46 on 12/07/2018     UA - trace of blood with pH 5.0     FINAL IMPRESSION:   renal calculi, microscopic hematuria, BPH     RECOMMENDATIONS:   urine culture and cytology, PSA.  CT scan abdomen pelvis without contrast and KUB.  Will contact patient with results and notify him of next appointment.

## 2020-01-09 LAB
BACTERIA UR CULT: NO GROWTH
FINAL PATHOLOGIC DIAGNOSIS: NORMAL

## 2020-01-13 ENCOUNTER — OFFICE VISIT (OUTPATIENT)
Dept: FAMILY MEDICINE | Facility: CLINIC | Age: 65
End: 2020-01-13
Payer: COMMERCIAL

## 2020-01-13 VITALS
HEART RATE: 67 BPM | TEMPERATURE: 98 F | RESPIRATION RATE: 12 BRPM | HEIGHT: 67 IN | OXYGEN SATURATION: 97 % | WEIGHT: 184.31 LBS | BODY MASS INDEX: 28.93 KG/M2 | SYSTOLIC BLOOD PRESSURE: 130 MMHG | DIASTOLIC BLOOD PRESSURE: 80 MMHG

## 2020-01-13 DIAGNOSIS — Z23 NEED FOR PNEUMOCOCCAL VACCINATION: ICD-10-CM

## 2020-01-13 DIAGNOSIS — J30.1 SEASONAL ALLERGIC RHINITIS DUE TO POLLEN: ICD-10-CM

## 2020-01-13 DIAGNOSIS — E78.2 MIXED HYPERLIPIDEMIA: ICD-10-CM

## 2020-01-13 DIAGNOSIS — Z23 FLU VACCINE NEED: ICD-10-CM

## 2020-01-13 DIAGNOSIS — J01.00 ACUTE NON-RECURRENT MAXILLARY SINUSITIS: ICD-10-CM

## 2020-01-13 DIAGNOSIS — Z91.89 AT RISK FOR CORONARY ARTERY DISEASE: ICD-10-CM

## 2020-01-13 DIAGNOSIS — F41.1 GAD (GENERALIZED ANXIETY DISORDER): ICD-10-CM

## 2020-01-13 DIAGNOSIS — M51.26 LUMBAGO DUE TO DISPLACEMENT OF INTERVERTEBRAL DISC: Primary | ICD-10-CM

## 2020-01-13 PROCEDURE — 99999 PR PBB SHADOW E&M-EST. PATIENT-LVL IV: ICD-10-PCS | Mod: PBBFAC,,, | Performed by: FAMILY MEDICINE

## 2020-01-13 PROCEDURE — 3079F PR MOST RECENT DIASTOLIC BLOOD PRESSURE 80-89 MM HG: ICD-10-PCS | Mod: CPTII,S$GLB,, | Performed by: FAMILY MEDICINE

## 2020-01-13 PROCEDURE — 99214 PR OFFICE/OUTPT VISIT, EST, LEVL IV, 30-39 MIN: ICD-10-PCS | Mod: 25,S$GLB,, | Performed by: FAMILY MEDICINE

## 2020-01-13 PROCEDURE — 3079F DIAST BP 80-89 MM HG: CPT | Mod: CPTII,S$GLB,, | Performed by: FAMILY MEDICINE

## 2020-01-13 PROCEDURE — 90471 FLU VACCINE - HIGH DOSE (65+) PRESERVATIVE FREE IM: ICD-10-PCS | Mod: S$GLB,,, | Performed by: FAMILY MEDICINE

## 2020-01-13 PROCEDURE — 90662 IIV NO PRSV INCREASED AG IM: CPT | Mod: S$GLB,,, | Performed by: FAMILY MEDICINE

## 2020-01-13 PROCEDURE — 90472 PNEUMOCOCCAL CONJUGATE VACCINE 13-VALENT LESS THAN 5YO & GREATER THAN: ICD-10-PCS | Mod: S$GLB,,, | Performed by: FAMILY MEDICINE

## 2020-01-13 PROCEDURE — 3075F SYST BP GE 130 - 139MM HG: CPT | Mod: CPTII,S$GLB,, | Performed by: FAMILY MEDICINE

## 2020-01-13 PROCEDURE — 90472 IMMUNIZATION ADMIN EACH ADD: CPT | Mod: S$GLB,,, | Performed by: FAMILY MEDICINE

## 2020-01-13 PROCEDURE — 1101F PT FALLS ASSESS-DOCD LE1/YR: CPT | Mod: CPTII,S$GLB,, | Performed by: FAMILY MEDICINE

## 2020-01-13 PROCEDURE — 99999 PR PBB SHADOW E&M-EST. PATIENT-LVL IV: CPT | Mod: PBBFAC,,, | Performed by: FAMILY MEDICINE

## 2020-01-13 PROCEDURE — 90662 FLU VACCINE - HIGH DOSE (65+) PRESERVATIVE FREE IM: ICD-10-PCS | Mod: S$GLB,,, | Performed by: FAMILY MEDICINE

## 2020-01-13 PROCEDURE — 90670 PCV13 VACCINE IM: CPT | Mod: S$GLB,,, | Performed by: FAMILY MEDICINE

## 2020-01-13 PROCEDURE — 99214 OFFICE O/P EST MOD 30 MIN: CPT | Mod: 25,S$GLB,, | Performed by: FAMILY MEDICINE

## 2020-01-13 PROCEDURE — 3008F PR BODY MASS INDEX (BMI) DOCUMENTED: ICD-10-PCS | Mod: CPTII,S$GLB,, | Performed by: FAMILY MEDICINE

## 2020-01-13 PROCEDURE — 3008F BODY MASS INDEX DOCD: CPT | Mod: CPTII,S$GLB,, | Performed by: FAMILY MEDICINE

## 2020-01-13 PROCEDURE — 3075F PR MOST RECENT SYSTOLIC BLOOD PRESS GE 130-139MM HG: ICD-10-PCS | Mod: CPTII,S$GLB,, | Performed by: FAMILY MEDICINE

## 2020-01-13 PROCEDURE — 90670 PNEUMOCOCCAL CONJUGATE VACCINE 13-VALENT LESS THAN 5YO & GREATER THAN: ICD-10-PCS | Mod: S$GLB,,, | Performed by: FAMILY MEDICINE

## 2020-01-13 PROCEDURE — 90471 IMMUNIZATION ADMIN: CPT | Mod: S$GLB,,, | Performed by: FAMILY MEDICINE

## 2020-01-13 PROCEDURE — 1101F PR PT FALLS ASSESS DOC 0-1 FALLS W/OUT INJ PAST YR: ICD-10-PCS | Mod: CPTII,S$GLB,, | Performed by: FAMILY MEDICINE

## 2020-01-13 RX ORDER — AMOXICILLIN AND CLAVULANATE POTASSIUM 875; 125 MG/1; MG/1
1 TABLET, FILM COATED ORAL 2 TIMES DAILY
Qty: 20 TABLET | Refills: 0 | Status: SHIPPED | OUTPATIENT
Start: 2020-01-13 | End: 2020-06-08 | Stop reason: ALTCHOICE

## 2020-01-13 RX ORDER — FLUTICASONE PROPIONATE 50 MCG
1 SPRAY, SUSPENSION (ML) NASAL DAILY
Qty: 3 BOTTLE | Status: SHIPPED | OUTPATIENT
Start: 2020-01-13 | End: 2020-06-08

## 2020-01-13 RX ORDER — TRAZODONE HYDROCHLORIDE 50 MG/1
50 TABLET ORAL NIGHTLY PRN
Qty: 90 TABLET | Status: SHIPPED | OUTPATIENT
Start: 2020-01-13 | End: 2021-04-10

## 2020-01-13 RX ORDER — TIZANIDINE 4 MG/1
4 TABLET ORAL EVERY 6 HOURS PRN
Qty: 30 TABLET | Refills: 1 | Status: SHIPPED | OUTPATIENT
Start: 2020-01-13 | End: 2020-01-23

## 2020-01-13 RX ORDER — ESCITALOPRAM OXALATE 10 MG/1
10 TABLET ORAL NIGHTLY
Qty: 90 TABLET | Refills: 1 | Status: SHIPPED | OUTPATIENT
Start: 2020-01-13 | End: 2020-04-27

## 2020-01-13 RX ORDER — MONTELUKAST SODIUM 10 MG/1
10 TABLET ORAL NIGHTLY
Qty: 90 TABLET | Status: SHIPPED | OUTPATIENT
Start: 2020-01-13 | End: 2021-05-04

## 2020-01-13 NOTE — PROGRESS NOTES
Subjective:       Patient ID: Ang Oden Jr. is a 64 y.o. male.    Chief Complaint: Annual Exam    HPI  Review of Systems   Constitutional: Negative for activity change and unexpected weight change.   HENT: Positive for rhinorrhea. Negative for hearing loss and trouble swallowing.    Eyes: Negative for discharge and visual disturbance.   Respiratory: Negative for chest tightness and wheezing.    Cardiovascular: Negative for chest pain and palpitations.   Gastrointestinal: Negative for blood in stool, constipation, diarrhea and vomiting.   Endocrine: Negative for polydipsia and polyuria.   Genitourinary: Negative for difficulty urinating, hematuria and urgency.   Musculoskeletal: Negative for arthralgias, joint swelling and neck pain.   Neurological: Negative for weakness and headaches.   Psychiatric/Behavioral: Negative for confusion and dysphoric mood.       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    Your cholesterol is high.  Your 10 year risk of having a heart attack or a stroke is:The 10-year ASCVD risk score (Teddy BARROS Jr., et al., 2013) is: 13.6%    Values used to calculate the score:      Age: 64 years      Sex: Male      Is Non- : No      Diabetic: No      Tobacco smoker: No      Systolic Blood Pressure: 130 mmHg      Is BP treated: No      HDL Cholesterol: 56 mg/dL      Total Cholesterol: 232  mg/dL      Last gout 2 years and off allopurinol 1 year    urology Dr. Hartmann-[facility] , kidney stone. Having pain from it and on flomax , norco    Chronic intermittent LBP    Stress and anxious. Insomnia.  Worry too much , sad mood. Did well on celexa.   Objective:      Physical Exam   Constitutional: He is oriented to person, place, and time. He appears well-developed and well-nourished.   Cardiovascular: Normal rate, regular rhythm and normal heart sounds.   Pulmonary/Chest: Effort normal and breath sounds normal.   Musculoskeletal: He exhibits no edema.   Neurological: He is alert and oriented to person, place, and time.   Skin: Skin is warm and dry.   Psychiatric: He has a normal mood and affect.   Nursing note and vitals reviewed.      Assessment:       1. Lumbago due to displacement of intervertebral disc    2. At risk for coronary artery disease    3. Flu vaccine need    4. Need for pneumococcal vaccination    5. Seasonal allergic rhinitis due to pollen    6. Acute non-recurrent maxillary sinusitis    7. SURAJ (generalized anxiety disorder)    8. Mixed hyperlipidemia        Plan:         1. 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  Dispense: 30 tablet; Refill: 1    2. At risk for coronary artery disease  Screen and treat as indicated:    - CT Cardiac Scoring; Future    3. Flu vaccine need  Immunize today.  Counseled patient on risks, benefits and side effects.  Patient elected to proceed with vaccination.    - Influenza - High Dose (65+) (PF) (IM)    4. Need for pneumococcal vaccination  Immunize today.  Counseled patient on risks, benefits and side effects.  Patient elected to proceed with vaccination.    - (In Office Administered) Pneumococcal Conjugate Vaccine (13 Valent) (IM)    5. Seasonal allergic rhinitis due to  pollen  Recommend otc non-sedating antihistamine such as Loratadine and/or steroid nasal spray such as Flonase as directed and as needed.  Please return to clinic if symptoms persist after these interventions.      - montelukast (SINGULAIR) 10 mg tablet; Take 1 tablet (10 mg total) by mouth every evening.  Dispense: 90 tablet; Refill: prn  - fluticasone propionate (FLONASE) 50 mcg/actuation nasal spray; 1 spray (50 mcg total) by Each Nostril route once daily.  Dispense: 3 Bottle; Refill: prn    6. Acute non-recurrent maxillary sinus  - amoxicillin-clavulanate 875-125mg (AUGMENTIN) 875-125 mg per tablet; Take 1 tablet by mouth 2 (two) times daily.  Dispense: 20 tablet; Refill: 0    7. SURAJ (generalized anxiety disorder)  treat  - escitalopram oxalate (LEXAPRO) 10 MG tablet; Take 1 tablet (10 mg total) by mouth every evening.  Dispense: 90 tablet; Refill: 1    8. Mixed hyperlipidemia  I recommend a heart healthy diet rich in fiber, fresh vegetables and fruit and low in saturated fats (fried foods, red meat, etc.).  I also recommend regular exercise including a minimum of 150 minutes of cardio exercise per week and 2-30 minute workouts of strength training like light weights, yoga, pilates, etc.  You should work toward a body mass index of < 25.              Time spent with patient: 20 minutes    Patient with be reevaluated in 3 months or sooner prn    Greater than 50% of this visit was spent counseling as described in above documentation:Yes

## 2020-01-15 ENCOUNTER — TELEPHONE (OUTPATIENT)
Dept: ORTHOPEDICS | Facility: CLINIC | Age: 65
End: 2020-01-15

## 2020-01-16 ENCOUNTER — HOSPITAL ENCOUNTER (OUTPATIENT)
Dept: RADIOLOGY | Facility: HOSPITAL | Age: 65
Discharge: HOME | End: 2020-01-16
Attending: FAMILY MEDICINE
Payer: COMMERCIAL

## 2020-01-16 DIAGNOSIS — Z91.89 AT RISK FOR CORONARY ARTERY DISEASE: ICD-10-CM

## 2020-01-16 PROCEDURE — 75571 CT HRT W/O DYE W/CA TEST: CPT | Mod: 26,,, | Performed by: RADIOLOGY

## 2020-01-16 PROCEDURE — 75571 CT HRT W/O DYE W/CA TEST: CPT | Mod: TC

## 2020-01-16 PROCEDURE — 75571 CT CALCIUM SCORING CARDIAC: ICD-10-PCS | Mod: 26,,, | Performed by: RADIOLOGY

## 2020-02-01 DIAGNOSIS — R93.1 AGATSTON CORONARY ARTERY CALCIUM SCORE BETWEEN 100 AND 199: Primary | ICD-10-CM

## 2020-02-10 ENCOUNTER — PATIENT MESSAGE (OUTPATIENT)
Dept: FAMILY MEDICINE | Facility: CLINIC | Age: 65
End: 2020-02-10

## 2020-03-02 ENCOUNTER — TELEPHONE (OUTPATIENT)
Dept: FAMILY MEDICINE | Facility: CLINIC | Age: 65
End: 2020-03-02

## 2020-03-02 NOTE — TELEPHONE ENCOUNTER
----- Message from Chandler Ocasio sent at 3/2/2020  9:05 AM CST -----  Type: Needs Medical Advice    Who Called:  [facility] [place]  Symptoms (please be specific): How long has patient had these symptoms: Pharmacy name and phone #:  NA  Best Call Back Number: [phone]   Additional Information: The nurse requesting to speak with the nurse regarding sleep pap supplies.

## 2020-03-26 ENCOUNTER — LAB VISIT (OUTPATIENT)
Dept: LAB | Facility: HOSPITAL | Age: 65
End: 2020-03-26
Attending: INTERNAL MEDICINE
Payer: COMMERCIAL

## 2020-03-26 DIAGNOSIS — R07.9 CHEST PAIN, UNSPECIFIED: Primary | ICD-10-CM

## 2020-03-26 LAB
ALBUMIN SERPL BCP-MCNC: 3.7 G/DL (ref 3.5–5.2)
ALP SERPL-CCNC: 58 U/L (ref 55–135)
ALT SERPL W/O P-5'-P-CCNC: 47 U/L (ref 10–44)
ANION GAP SERPL CALC-SCNC: 13 MMOL/L (ref 8–16)
AST SERPL-CCNC: 49 U/L (ref 10–40)
BASOPHILS # BLD AUTO: 0.01 K/UL (ref 0–0.2)
BASOPHILS NFR BLD: 0.2 % (ref 0–1.9)
BILIRUB SERPL-MCNC: 0.6 MG/DL (ref 0.1–1)
BUN SERPL-MCNC: 14 MG/DL (ref 8–23)
CALCIUM SERPL-MCNC: 9.1 MG/DL (ref 8.7–10.5)
CHLORIDE SERPL-SCNC: 101 MMOL/L (ref 95–110)
CHOLEST SERPL-MCNC: 135 MG/DL (ref 120–199)
CHOLEST/HDLC SERPL: 4.1 {RATIO} (ref 2–5)
CO2 SERPL-SCNC: 24 MMOL/L (ref 23–29)
CREAT SERPL-MCNC: 1.1 MG/DL (ref 0.5–1.4)
DIFFERENTIAL METHOD: ABNORMAL
EOSINOPHIL # BLD AUTO: 0 K/UL (ref 0–0.5)
EOSINOPHIL NFR BLD: 0.7 % (ref 0–8)
ERYTHROCYTE [DISTWIDTH] IN BLOOD BY AUTOMATED COUNT: 13 % (ref 11.5–14.5)
EST. GFR  (AFRICAN AMERICAN): >60 ML/MIN/1.73 M^2
EST. GFR  (NON AFRICAN AMERICAN): >60 ML/MIN/1.73 M^2
GLUCOSE SERPL-MCNC: 74 MG/DL (ref 70–110)
HCT VFR BLD AUTO: 36.4 % (ref 40–54)
HDLC SERPL-MCNC: 33 MG/DL (ref 40–75)
HDLC SERPL: 24.4 % (ref 20–50)
HGB BLD-MCNC: 12.1 G/DL (ref 14–18)
IMM GRANULOCYTES # BLD AUTO: 0.01 K/UL (ref 0–0.04)
IMM GRANULOCYTES NFR BLD AUTO: 0.2 % (ref 0–0.5)
LDLC SERPL CALC-MCNC: 91 MG/DL (ref 63–159)
LYMPHOCYTES # BLD AUTO: 1.5 K/UL (ref 1–4.8)
LYMPHOCYTES NFR BLD: 37.1 % (ref 18–48)
MCH RBC QN AUTO: 30.6 PG (ref 27–31)
MCHC RBC AUTO-ENTMCNC: 33.2 G/DL (ref 32–36)
MCV RBC AUTO: 92 FL (ref 82–98)
MONOCYTES # BLD AUTO: 0.6 K/UL (ref 0.3–1)
MONOCYTES NFR BLD: 15.2 % (ref 4–15)
NEUTROPHILS # BLD AUTO: 1.9 K/UL (ref 1.8–7.7)
NEUTROPHILS NFR BLD: 46.6 % (ref 38–73)
NONHDLC SERPL-MCNC: 102 MG/DL
NRBC BLD-RTO: 0 /100 WBC
PLATELET # BLD AUTO: 148 K/UL (ref 150–350)
PMV BLD AUTO: 10 FL (ref 9.2–12.9)
POTASSIUM SERPL-SCNC: 3.7 MMOL/L (ref 3.5–5.1)
PROT SERPL-MCNC: 7.1 G/DL (ref 6–8.4)
RBC # BLD AUTO: 3.95 M/UL (ref 4.6–6.2)
SODIUM SERPL-SCNC: 138 MMOL/L (ref 136–145)
TRIGL SERPL-MCNC: 55 MG/DL (ref 30–150)
WBC # BLD AUTO: 4.02 K/UL (ref 3.9–12.7)

## 2020-03-26 PROCEDURE — 36415 COLL VENOUS BLD VENIPUNCTURE: CPT

## 2020-03-26 PROCEDURE — 85025 COMPLETE CBC W/AUTO DIFF WBC: CPT

## 2020-03-26 PROCEDURE — 80053 COMPREHEN METABOLIC PANEL: CPT

## 2020-03-26 PROCEDURE — 80061 LIPID PANEL: CPT

## 2020-04-27 ENCOUNTER — OFFICE VISIT (OUTPATIENT)
Dept: FAMILY MEDICINE | Facility: CLINIC | Age: 65
End: 2020-04-27
Payer: COMMERCIAL

## 2020-04-27 DIAGNOSIS — B35.6 TINEA CRURIS: ICD-10-CM

## 2020-04-27 DIAGNOSIS — F41.1 GAD (GENERALIZED ANXIETY DISORDER): ICD-10-CM

## 2020-04-27 DIAGNOSIS — E78.2 MIXED HYPERLIPIDEMIA: ICD-10-CM

## 2020-04-27 DIAGNOSIS — I10 ESSENTIAL HYPERTENSION: Primary | ICD-10-CM

## 2020-04-27 DIAGNOSIS — M51.26 LUMBAGO DUE TO DISPLACEMENT OF INTERVERTEBRAL DISC: ICD-10-CM

## 2020-04-27 DIAGNOSIS — E66.3 OVERWEIGHT (BMI 25.0-29.9): ICD-10-CM

## 2020-04-27 PROCEDURE — 99214 OFFICE O/P EST MOD 30 MIN: CPT | Mod: 95,,, | Performed by: NURSE PRACTITIONER

## 2020-04-27 PROCEDURE — 99214 PR OFFICE/OUTPT VISIT, EST, LEVL IV, 30-39 MIN: ICD-10-PCS | Mod: 95,,, | Performed by: NURSE PRACTITIONER

## 2020-04-27 PROCEDURE — 1101F PT FALLS ASSESS-DOCD LE1/YR: CPT | Mod: CPTII,,, | Performed by: NURSE PRACTITIONER

## 2020-04-27 PROCEDURE — 1101F PR PT FALLS ASSESS DOC 0-1 FALLS W/OUT INJ PAST YR: ICD-10-PCS | Mod: CPTII,,, | Performed by: NURSE PRACTITIONER

## 2020-04-27 RX ORDER — TAMSULOSIN HYDROCHLORIDE 0.4 MG/1
CAPSULE ORAL
Qty: 90 CAPSULE | Refills: 3 | Status: SHIPPED | OUTPATIENT
Start: 2020-04-27 | End: 2020-07-14 | Stop reason: ALTCHOICE

## 2020-04-27 RX ORDER — MOMETASONE FUROATE 1 MG/G
CREAM TOPICAL 2 TIMES DAILY PRN
Qty: 15 G | Refills: 3 | Status: SHIPPED | OUTPATIENT
Start: 2020-04-27 | End: 2021-09-07 | Stop reason: SDUPTHER

## 2020-04-27 RX ORDER — CITALOPRAM 20 MG/1
20 TABLET, FILM COATED ORAL DAILY
Qty: 30 TABLET | Refills: 11 | Status: SHIPPED | OUTPATIENT
Start: 2020-04-27 | End: 2021-09-02

## 2020-04-27 NOTE — PROGRESS NOTES
Subjective:       Patient ID: Ang Oden Jr. is a 64 y.o. male.    Chief Complaint: No chief complaint on file.    The patient location is: [place], [place]  The chief complaint leading to consultation is: chronic diease follow up  Visit type: audiovisual  Total time spent with patient: 20 minutes  Each patient to whom he or she provides medical services by telemedicine is:  (1) informed of the relationship between the physician and patient and the respective role of any other health care provider with respect to management of the patient; and (2) notified that he or she may decline to receive medical services by telemedicine and may withdraw from such care at any time.    Notes: Stress and anxious. On Lexapro.  Patient wants to start taking Celexa. Patient reports taking celexa in the past and it helped with his anxiety.. Insomnia has improved.  urology Dr. Hartmann-[facility] , kidney stone. Having pain from it and on flomax , norco     Chronic intermittent LBP           Past Medical History:   Diagnosis Date    Anemia     Anticoagulant long-term use     Chronic kidney disease     Chronic rhinitis     Disc     lower back    DJD (degenerative joint disease)     Foot pain     left foot    Gout, unspecified     HBP (high blood pressure)     High cholesterol     Kidney stone     Sleep apnea     Weight loss     15 pounds in two months       Review of patient's allergies indicates:   Allergen Reactions    No known drug allergies          Current Outpatient Medications:     amoxicillin-clavulanate 875-125mg (AUGMENTIN) 875-125 mg per tablet, Take 1 tablet by mouth 2 (two) times daily., Disp: 20 tablet, Rfl: 0    aspirin (ASPIR-81) 81 MG EC tablet, Take by mouth. 1 Tablet, Delayed Release (E.C.) Oral Every day, Disp: , Rfl:     citalopram (CELEXA) 20 MG tablet, Take 1 tablet (20 mg total) by mouth once daily., Disp: 30 tablet, Rfl: 11    fluticasone propionate (FLONASE) 50 mcg/actuation nasal spray, 1 spray (50  mcg total) by Each Nostril route once daily., Disp: 3 Bottle, Rfl: prn    HYDROcodone-acetaminophen (NORCO) 5-325 mg per tablet, Take 1 tablet by mouth every 6 (six) hours as needed for Pain., Disp: 20 tablet, Rfl: 0    mometasone 0.1% (ELOCON) 0.1 % cream, Apply topically 2 (two) times daily as needed., Disp: 15 g, Rfl: 3    montelukast (SINGULAIR) 10 mg tablet, Take 1 tablet (10 mg total) by mouth every evening., Disp: 90 tablet, Rfl: prn    tamsulosin (FLOMAX) 0.4 mg Cap, TAKE 1 CAPSULE BY MOUTH EVERY DAY, Disp: 90 capsule, Rfl: 3    traZODone (DESYREL) 50 MG tablet, Take 1 tablet (50 mg total) by mouth nightly as needed for Insomnia., Disp: 90 tablet, Rfl: prn    Review of Systems   Constitutional: Negative for unexpected weight change.   HENT: Negative for trouble swallowing.    Eyes: Negative for visual disturbance.   Respiratory: Negative for shortness of breath.    Cardiovascular: Negative for chest pain, palpitations and leg swelling.   Gastrointestinal: Negative for blood in stool.   Genitourinary: Negative for hematuria.   Skin: Negative for rash.   Allergic/Immunologic: Negative for immunocompromised state.   Neurological: Negative for headaches.   Hematological: Does not bruise/bleed easily.   Psychiatric/Behavioral: Negative for agitation. The patient is not nervous/anxious.        Objective:      There were no vitals taken for this visit.  Physical Exam    Assessment:       1. Essential hypertension    2. Mixed hyperlipidemia    3. SURAJ (generalized anxiety disorder)    4. Lumbago due to displacement of intervertebral disc    5. Tinea cruris    6. Overweight (BMI 25.0-29.9)        Plan:       Essential hypertension  Stable, continue medicaiton  Mixed hyperlipidemia  Cholesterol levels have improve, continue a low fat, low cholesterol diet  SURAJ (generalized anxiety disorder)  -     citalopram (CELEXA) 20 MG tablet; Take 1 tablet (20 mg total) by mouth once daily.  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  Dispense: 15 g; Refill: 3    Overweight (BMI 25.0-29.9)  Counseled patient on his ideal body weight, health consequences of being obese and current recommendations including weekly exercise and a heart healthy diet.  Current BMI is:Estimated body mass index is 28.87 kg/m² as calculated from the following:    Height as of 1/13/20: 5' 7" (1.702 m).    Weight as of 1/13/20: 83.6 kg (184 lb 4.9 oz)..  Patient is aware that ideal BMI < 25 or  .            Time spent with patient: 20 minutes    Patient with be reevaluated in 4 months or sooner prn    Greater than 50% of this visit was spent counseling as described in above documentation:Yes  "

## 2020-05-05 ENCOUNTER — PATIENT MESSAGE (OUTPATIENT)
Dept: ADMINISTRATIVE | Facility: HOSPITAL | Age: 65
End: 2020-05-05

## 2020-06-08 ENCOUNTER — TELEPHONE (OUTPATIENT)
Dept: FAMILY MEDICINE | Facility: CLINIC | Age: 65
End: 2020-06-08

## 2020-06-08 ENCOUNTER — OFFICE VISIT (OUTPATIENT)
Dept: FAMILY MEDICINE | Facility: CLINIC | Age: 65
End: 2020-06-08
Payer: COMMERCIAL

## 2020-06-08 ENCOUNTER — PATIENT MESSAGE (OUTPATIENT)
Dept: RHEUMATOLOGY | Facility: CLINIC | Age: 65
End: 2020-06-08

## 2020-06-08 DIAGNOSIS — J01.11 ACUTE RECURRENT FRONTAL SINUSITIS: Primary | ICD-10-CM

## 2020-06-08 PROCEDURE — 99214 PR OFFICE/OUTPT VISIT, EST, LEVL IV, 30-39 MIN: ICD-10-PCS | Mod: 95,,, | Performed by: NURSE PRACTITIONER

## 2020-06-08 PROCEDURE — 99214 OFFICE O/P EST MOD 30 MIN: CPT | Mod: 95,,, | Performed by: NURSE PRACTITIONER

## 2020-06-08 PROCEDURE — 1101F PT FALLS ASSESS-DOCD LE1/YR: CPT | Mod: CPTII,,, | Performed by: NURSE PRACTITIONER

## 2020-06-08 PROCEDURE — 1101F PR PT FALLS ASSESS DOC 0-1 FALLS W/OUT INJ PAST YR: ICD-10-PCS | Mod: CPTII,,, | Performed by: NURSE PRACTITIONER

## 2020-06-08 RX ORDER — AMOXICILLIN 500 MG/1
500 CAPSULE ORAL 3 TIMES DAILY
Qty: 21 CAPSULE | Refills: 0 | Status: SHIPPED | OUTPATIENT
Start: 2020-06-08 | End: 2020-06-15

## 2020-06-08 RX ORDER — FLUTICASONE PROPIONATE 50 MCG
2 SPRAY, SUSPENSION (ML) NASAL DAILY PRN
Qty: 9.9 ML | Refills: 1 | Status: SHIPPED | OUTPATIENT
Start: 2020-06-08 | End: 2020-08-09

## 2020-06-08 NOTE — PATIENT INSTRUCTIONS
Sinusitis (Antibiotic Treatment)    The sinuses are air-filled spaces within the bones of the face. They connect to the inside of the nose. Sinusitis is an inflammation of the tissue lining the sinus cavity. Sinus inflammation can occur during a cold. It can also be due to allergies to pollens and other particles in the air. Sinusitis can cause symptoms of sinus congestion and fullness. A sinus infection causes fever, headache and facial pain. There is often green or yellow drainage from the nose or into the back of the throat (post-nasal drip). You have been given antibiotics to treat this condition.  Home care:  · Take the full course of antibiotics as instructed. Do not stop taking them, even if you feel better.  · Drink plenty of water, hot tea, and other liquids. This may help thin mucus. It also may promote sinus drainage.  · Heat may help soothe painful areas of the face. Use a towel soaked in hot water. Or,  the shower and direct the hot spray onto your face. Using a vaporizer along with a menthol rub at night may also help.   · An expectorant containing guaifenesin may help thin the mucus and promote drainage from the sinuses.  · Over-the-counter decongestants may be used unless a similar medicine was prescribed. Nasal sprays work the fastest. Use one that contains phenylephrine or oxymetazoline. First blow the nose gently. Then use the spray. Do not use these medicines more often than directed on the label or symptoms may get worse. You may also use tablets containing pseudoephedrine. Avoid products that combine ingredients, because side effects may be increased. Read labels. You can also ask the pharmacist for help. (NOTE: Persons with high blood pressure should not use decongestants. They can raise blood pressure.)  · Over-the-counter antihistamines may help if allergies contributed to your sinusitis.    · Do not use nasal rinses or irrigation during an acute sinus infection, unless told to by  your health care provider. Rinsing may spread the infection to other sinuses.  · Use acetaminophen or ibuprofen to control pain, unless another pain medicine was prescribed. (If you have chronic liver or kidney disease or ever had a stomach ulcer, talk with your doctor before using these medicines. Aspirin should never be used in anyone under 18 years of age who is ill with a fever. It may cause severe liver damage.)  · Don't smoke. This can worsen symptoms.  Follow-up care  Follow up with your healthcare provider or our staff if you are not improving within the next week.  When to seek medical advice  Call your healthcare provider if any of these occur:  · Facial pain or headache becoming more severe  · Stiff neck  · Unusual drowsiness or confusion  · Swelling of the forehead or eyelids  · Vision problems, including blurred or double vision  · Fever of 100.4ºF (38ºC) or higher, or as directed by your healthcare provider  · Seizure  · Breathing problems  · Symptoms not resolving within 10 days  Date Last Reviewed: 4/13/2015  © 6829-7696 [employer]. [address] [place] [address]. All rights reserved. This information is not intended as a substitute for professional medical care. Always follow your healthcare professional's instructions.        Causes of Sinusitis  Mucus helps keep your sinuses clean. But mucus may build up in the sinuses because of colds, allergies, or blockages. These things get in the way of the natural drainage of mucus. This may lead to sinusitis. Sinusitis means sinus inflammation and infection.  · Acute sinusitis comes on suddenly. It often happens right after an upper respiratory infection, such as a cold. Viruses cause most acute sinus infections.  · Chronic sinusitis is ongoing swelling of the sinus lining. Doctors don't know what causes chronic sinusitis.      Colds and other infections  A cold or flu may cause your sinus and nasal linings to swell. Sinus openings  can become blocked. This causes mucus to back up. This backed-up mucus becomes an ideal place for bacteria to grow. Thick, yellow, or discolored mucus is one sign of infection.  Allergic reactions  You may be sensitive to certain substances. This causes the release of histamine in the body. Histamine makes your sinus and nasal linings swell. Long-term swelling clogs your sinuses. It prevents the tiny hairs (cilia) in the nasal lining from sweeping away mucus. Allergy symptoms can continue over time. But theyre less severe than with colds.  Blockages  · A polyp is a sac of swollen tissue. It can be the result of an allergy or infection. It may block the opening where most of your sinuses drain (middle meatus). It may even grow large enough to block your nose.  · A deviated septum is when the thin wall inside your nose is pushed to one side. It is often the result of injury. This can block your middle meatus.  People with chronic nasal problems or allergies are more likely to get acute sinusitis. Sinusitis is also more common if you have a weakened immune system, such as with HIV. You are also more likely to get sinusitis if you have cystic fibrosis or another condition that causes your body to make extra mucus.  Date Last Reviewed: 10/1/2016  © 3842-2759 [employer]. [address] [place] [address], [place] [address]. All rights reserved. This information is not intended as a substitute for professional medical care. Always follow your healthcare professional's instructions.        Preventing Sinusitis    Colds, flu, and allergies make it more likely for you to get sinusitis. Do your best to prevent sinusitis by preventing these problems. Do what you can to avoid getting colds and other infections. Stay away from things that cause allergies (allergens). Keep your sinuses as moist as you can.  Tips for air travel  When traveling on an airplane, use saline nasal spray to keep your sinuses moist. Drink plenty of  fluids. You may also want to take a decongestant before you get on the plane.   Prevent colds  Do what you can to avoid being exposed to colds and flu. When possible, take more time to rest when you feel something coming on.  · Wash your hands often. This is especially important during cold and flu season. Try not to touch your face.  · As much as possible, stay away from infected people.  · Follow these standbys for staying healthy: Eat balanced meals, exercise regularly, and get plenty of sleep.  Stay away from allergens  First find out what things youre allergic to. Then take steps to stay away from allergens or irritants in the air such as dust, pollution, and pollen.  · Wear a mask when you clean. Or consider hiring a  to help you stay away from dust.  · Sit in the nonsmoking sections of restaurants.  · Don't go outdoors during peak pollution hours such as rush hour.  · Keep an air conditioner on during allergy season. Clean its filter regularly.  · Ask your healthcare provider about a referral to have an allergy evaluation. Or ask for a referral to see an allergy specialist.  Boost moisture  Keeping your sinuses moist makes your mucus thinner. This allows your sinuses to drain better. And this helps prevent infection. Ask your doctor about these suggestions:  · Use a humidifier. Clean it often to remove any mold or mildew.  · Drink several glasses of water a day.  · Stay away from drying beverages such as alcohol and coffee.  · Stay away from all types of smoke, which dries out sinus linings. This includes tobacco smoke and chemical smoke in workplace settings.  · Use saltwater rinses.  Date Last Reviewed: 10/1/2016  © 6811-4426 [employer]. [address] [place] [address]. All rights reserved. This information is not intended as a substitute for professional medical care. Always follow your healthcare professional's instructions.        Understanding Sinus  Problems    You dont often think about your sinuses until theres a problem. One day you realize you cant smell dinner cooking. Or you find you often have headaches or problems breathing through your nose.  Symptoms of sinus problems  Sinus problems can cause uncomfortable symptoms. Your nose may run constantly. You might have trouble sleeping at night. You may even lose your sense of smell. Other symptoms can include:  · Nasal congestion  · Fullness in ears  · Green, yellow, or bloody drainage from the nose  · Trouble tasting food  · Frequent headaches  · Facial pain  · Cough  When sinuses are blocked  If something blocks the passages in the nose or sinuses, mucus cant drain. Mucus-filled sinuses often become infected.  · Colds cause the lining of the nose and sinuses to swell and make extra mucus. A buildup of mucus can lead to a more serious infection.  · Allergies irritate turbinates and other tissues. This causes swelling, which can cause a blockage. Over time, this irritation can also lead to sacs of swollen tissue (polyps).  · Polyps may form in both the sinuses and nose. Polyps can grow large enough to clog nasal passages and block drainage.  · A crooked (deviated) septum may block nasal passages. This is often the result of an injury.  Date Last Reviewed: 11/1/2016  © 8483-4355 [employer]. [address] [place] [address], [place] [address]. All rights reserved. This information is not intended as a substitute for professional medical care. Always follow your healthcare professional's instructions.

## 2020-06-08 NOTE — TELEPHONE ENCOUNTER
This message has been advised.     ----- Message from Ute Bingham sent at 6/8/2020  2:15 PM CDT -----  Contact: PT  Type: Needs Medical Advice    Who Called:  Pt  Best Call Back Number: [phone]  Additional Information: Requesting a call back regarding pt wanted to do a virtual appt and not come in to the office   Please Advise ---Thank you

## 2020-06-08 NOTE — TELEPHONE ENCOUNTER
Noted, and spoke with patient.      ----- Message from Robin Tang sent at 6/8/2020  3:30 PM CDT -----  Contact: pt  Type:  Patient Returning Call    Who Called:  pt  Who Left Message for Patient:  Camila NONA  Does the patient know what this is regarding?:  Appointment for today 5pm  Best Call Back Number:  [phone]  Additional Information:  PT prefers video visit currently scheduled

## 2020-06-08 NOTE — PROGRESS NOTES
Subjective:    The patient location is: [place], [place]  The chief complaint leading to consultation is: sinus pain    Visit type: audiovisual    Face to Face time with patient: 20 minutes of total time spent on the encounter, which includes face to face time and non-face to face time preparing to see the patient (eg, review of tests), Obtaining and/or reviewing separately obtained history, Documenting clinical information in the electronic or other health record, Independently interpreting results (not separately reported) and communicating results to the patient/family/caregiver, or Care coordination (not separately reported).     Each patient to whom he or she provides medical services by telemedicine is:  (1) informed of the relationship between the physician and patient and the respective role of any other health care provider with respect to management of the patient; and (2) notified that he or she may decline to receive medical services by telemedicine and may withdraw from such care at any time.     Patient ID: Ang Oden Jr. is a 64 y.o. male presents to televisit for sinus pain. This patient is new to me. History of recurrent sinusitis. Last seen by allergist in 1/13/13. His allergy test was negative and did not keep follow up appointment.     Chief Complaint: Sinus Problem    Sinusitis   This is a new problem. The current episode started 1 to 4 weeks ago. The problem is unchanged. There has been no fever. His pain is at a severity of 5/10. The pain is moderate. Associated symptoms include headaches and sinus pressure. Pertinent negatives include no chills, congestion, coughing, diaphoresis, ear pain, hoarse voice, neck pain, shortness of breath, sneezing, sore throat or swollen glands. Treatments tried: Ibuprofen. The treatment provided no relief.     Vitals: no vitals were taken this visit.   There were no vitals filed for this visit.  There is no height or weight on file to calculate BMI.    Past  Medical History:   Diagnosis Date    Anemia     Anticoagulant long-term use     Chronic kidney disease     Chronic rhinitis     Disc     lower back    DJD (degenerative joint disease)     Foot pain     left foot    Gout, unspecified     HBP (high blood pressure)     High cholesterol     Kidney stone     Sleep apnea     Weight loss     15 pounds in two months     Past Surgical History:   Procedure Laterality Date    APPENDECTOMY      VASECTOMY       Social History     Socioeconomic History    Marital status:      Spouse name: Not on file    Number of children: Not on file    Years of education: Not on file    Highest education level: Not on file   Occupational History    Not on file   Social Needs    Financial resource strain: Not on file    Food insecurity:     Worry: Not on file     Inability: Not on file    Transportation needs:     Medical: Not on file     Non-medical: Not on file   Tobacco Use    Smoking status: Former Smoker     Last attempt to quit: 9/10/1982     Years since quittin.7    Smokeless tobacco: Never Used   Substance and Sexual Activity    Alcohol use: Yes     Frequency: Never     Drinks per session: Patient refused     Binge frequency: Never     Comment: About 1/2 gallon of whiskey per week.  Patient drinks daily.    Drug use: No    Sexual activity: Not on file   Lifestyle    Physical activity:     Days per week: Not on file     Minutes per session: Not on file    Stress: To some extent   Relationships    Social connections:     Talks on phone: Patient refused     Gets together: Patient refused     Attends L[employer] service: Not on file     Active member of club or organization: Yes     Attends meetings of clubs or organizations: Never     Relationship status:    Other Topics Concern    Not on file   Social History Narrative    Not on file       Review of patient's allergies indicates:   Allergen Reactions    No known drug allergies         Current Outpatient Medications:     amoxicillin (AMOXIL) 500 MG capsule, Take 1 capsule (500 mg total) by mouth 3 (three) times daily. for 7 days, Disp: 21 capsule, Rfl: 0    aspirin (ASPIR-81) 81 MG EC tablet, Take by mouth. 1 Tablet, Delayed Release (E.C.) Oral Every day, Disp: , Rfl:     citalopram (CELEXA) 20 MG tablet, Take 1 tablet (20 mg total) by mouth once daily., Disp: 30 tablet, Rfl: 11    fluticasone propionate (FLONASE) 50 mcg/actuation nasal spray, 2 sprays (100 mcg total) by Each Nostril route daily as needed for Allergies., Disp: 9.9 mL, Rfl: 1    HYDROcodone-acetaminophen (NORCO) 5-325 mg per tablet, Take 1 tablet by mouth every 6 (six) hours as needed for Pain., Disp: 20 tablet, Rfl: 0    mometasone 0.1% (ELOCON) 0.1 % cream, Apply topically 2 (two) times daily as needed., Disp: 15 g, Rfl: 3    montelukast (SINGULAIR) 10 mg tablet, Take 1 tablet (10 mg total) by mouth every evening., Disp: 90 tablet, Rfl: prn    tamsulosin (FLOMAX) 0.4 mg Cap, TAKE 1 CAPSULE BY MOUTH EVERY DAY, Disp: 90 capsule, Rfl: 3    traZODone (DESYREL) 50 MG tablet, Take 1 tablet (50 mg total) by mouth nightly as needed for Insomnia., Disp: 90 tablet, Rfl: prn    Review of Systems   Constitutional: Negative for activity change, appetite change, chills, diaphoresis, fatigue, fever and unexpected weight change.   HENT: Positive for sinus pressure and sinus pain. Negative for congestion, dental problem, drooling, ear discharge, ear pain, facial swelling, hearing loss, hoarse voice, mouth sores, nosebleeds, postnasal drip, rhinorrhea, sneezing, sore throat, tinnitus, trouble swallowing and voice change.    Eyes: Negative for photophobia, pain, discharge and visual disturbance.   Respiratory: Negative for apnea, cough, chest tightness, shortness of breath and wheezing.    Cardiovascular: Negative for chest pain, palpitations and leg swelling.   Gastrointestinal: Negative for abdominal distention, abdominal pain,  constipation, diarrhea, nausea and vomiting.   Endocrine: Negative for polydipsia, polyphagia and polyuria.   Genitourinary: Negative for decreased urine volume, difficulty urinating, dysuria, flank pain, frequency and urgency.   Musculoskeletal: Negative for arthralgias, back pain, gait problem, joint swelling and neck pain.   Skin: Negative for color change, pallor, rash and wound.   Neurological: Positive for headaches. Negative for dizziness, facial asymmetry, speech difficulty, weakness, light-headedness and numbness.   Hematological: Negative for adenopathy.   Psychiatric/Behavioral: Negative for agitation, behavioral problems, confusion, decreased concentration, hallucinations, sleep disturbance and suicidal ideas. The patient is not nervous/anxious.        Objective:      Physical Exam   Constitutional: He is oriented to person, place, and time. He appears well-nourished. No distress.   HENT:   Head: Normocephalic and atraumatic.   Mouth/Throat: Oropharynx is clear and moist. No oropharyngeal exudate.   Tenderness to palpation of the frontal sinuses on patient self-exam  No tenderness to palpation of the maxillary sinuses on patient self-exam   Eyes: EOM are normal.   Neck:   No cervical adenopathy palpated on patient self-exam   Cardiovascular:   no fabian oral cyanosis  Capillary refill less than 5 sec on patient self-exam   Pulmonary/Chest: Effort normal.   No retractions, increased work of breathing, and audible wheezes   Abdominal:   No abdominal TTP on patient self-exam,   No CVA tenderness as palpated by patient   Neurological: He is alert and oriented to person, place, and time.   Skin:   No observed rashes/bruises.   Psychiatric: He has a normal mood and affect. His behavior is normal.       Assessment:       1. Acute recurrent frontal sinusitis        Plan:     Ang was seen today for sinus problem. I am concerned about his recurrent sinusitis. I placed a referral for allergy for further testing.      Diagnoses and all orders for this visit:    Acute recurrent frontal sinusitis  -     fluticasone propionate (FLONASE) 50 mcg/actuation nasal spray; 2 sprays (100 mcg total) by Each Nostril route daily as needed for Allergies.  -     amoxicillin (AMOXIL) 500 MG capsule; Take 1 capsule (500 mg total) by mouth 3 (three) times daily. for 7 days  -     Ambulatory referral/consult to Allergy; Future    Irrigating the nasal cavities with saline immediately prior to administration of other intranasal medications, so that the mucosa is freshly cleansed when the medications are introduced. This reduce postnasal drainage, removes secretions, and rinses away allergens and irritants.      Follow up if symptoms worsen or fail to improve.

## 2020-06-23 ENCOUNTER — OFFICE VISIT (OUTPATIENT)
Dept: ALLERGY | Facility: CLINIC | Age: 65
End: 2020-06-23
Payer: COMMERCIAL

## 2020-06-23 VITALS — HEIGHT: 67 IN | BODY MASS INDEX: 27.66 KG/M2 | WEIGHT: 176.25 LBS | HEART RATE: 68 BPM | OXYGEN SATURATION: 98 %

## 2020-06-23 DIAGNOSIS — J31.0 CHRONIC RHINITIS: ICD-10-CM

## 2020-06-23 DIAGNOSIS — J32.9 CHRONIC SINUSITIS, UNSPECIFIED LOCATION: Primary | ICD-10-CM

## 2020-06-23 DIAGNOSIS — R51.9 NONINTRACTABLE EPISODIC HEADACHE, UNSPECIFIED HEADACHE TYPE: ICD-10-CM

## 2020-06-23 PROCEDURE — 99204 PR OFFICE/OUTPT VISIT, NEW, LEVL IV, 45-59 MIN: ICD-10-PCS | Mod: S$GLB,,, | Performed by: ALLERGY & IMMUNOLOGY

## 2020-06-23 PROCEDURE — 99999 PR PBB SHADOW E&M-EST. PATIENT-LVL III: CPT | Mod: PBBFAC,,, | Performed by: ALLERGY & IMMUNOLOGY

## 2020-06-23 PROCEDURE — 3008F PR BODY MASS INDEX (BMI) DOCUMENTED: ICD-10-PCS | Mod: CPTII,S$GLB,, | Performed by: ALLERGY & IMMUNOLOGY

## 2020-06-23 PROCEDURE — 1101F PT FALLS ASSESS-DOCD LE1/YR: CPT | Mod: CPTII,S$GLB,, | Performed by: ALLERGY & IMMUNOLOGY

## 2020-06-23 PROCEDURE — 99999 PR PBB SHADOW E&M-EST. PATIENT-LVL III: ICD-10-PCS | Mod: PBBFAC,,, | Performed by: ALLERGY & IMMUNOLOGY

## 2020-06-23 PROCEDURE — 99204 OFFICE O/P NEW MOD 45 MIN: CPT | Mod: S$GLB,,, | Performed by: ALLERGY & IMMUNOLOGY

## 2020-06-23 PROCEDURE — 3008F BODY MASS INDEX DOCD: CPT | Mod: CPTII,S$GLB,, | Performed by: ALLERGY & IMMUNOLOGY

## 2020-06-23 PROCEDURE — 1101F PR PT FALLS ASSESS DOC 0-1 FALLS W/OUT INJ PAST YR: ICD-10-PCS | Mod: CPTII,S$GLB,, | Performed by: ALLERGY & IMMUNOLOGY

## 2020-06-23 RX ORDER — AMOXICILLIN AND CLAVULANATE POTASSIUM 875; 125 MG/1; MG/1
1 TABLET, FILM COATED ORAL 2 TIMES DAILY
Qty: 42 TABLET | Refills: 0 | Status: SHIPPED | OUTPATIENT
Start: 2020-06-23 | End: 2020-07-14

## 2020-06-23 RX ORDER — PSEUDOEPHEDRINE HCL 30 MG
30 TABLET ORAL EVERY 6 HOURS PRN
Qty: 30 TABLET | Refills: 0 | Status: SHIPPED | OUTPATIENT
Start: 2020-06-23 | End: 2020-07-03

## 2020-06-23 RX ORDER — AZELASTINE 1 MG/ML
1 SPRAY, METERED NASAL 2 TIMES DAILY
Qty: 30 ML | Refills: 11 | Status: SHIPPED | OUTPATIENT
Start: 2020-06-23 | End: 2021-06-08

## 2020-06-23 NOTE — PROGRESS NOTES
ALLERGY & IMMUNOLOGY CLINIC -  INITIAL CONSULTATION     HISTORY OF PRESENT ILLNESS     Patient ID: Ang Oden Jr. is a 64 y.o. male    CC: sinus issues    HPI: 65 yo man presents for evaluation, referred by Dr. Anderson for possible allergies.     He has been seen once before by Dr. Mendoza in 2013. He had extensive immunocaps done at that time - all were negative.     Today he reports nasal congestion going on for months, this has progressed to two weeks of headaches, with pain localized over eyes and forehead, and worse on the right side. He is not prone to headaches, the headaches are new. Historically, he has had headaches when he has had a bad sinus infection. He reports that he rarely experiences rhinorrhea or post-nasal drip. Occasional tooth pain. Sense of smell is intact, sometimes he smells a foul-smelling odor when he leans forward. If he is able to blow mucous out of his nose, it is thick and green or yellow in color. No fevers or chills. No cough or wheeze or dyspnea.     Currently taking:   Flonase 2 SEN BID  Loratadine daily  Singulair  IBProfen prn headaches  Amoxicillin x 7 days, completed this course one week ago    This combination of medications has not helped with his symptoms.      REVIEW OF SYSTEMS     CONST: no F/C/NS, no unintentional weight changes  NEURO: + H/A, no weakness, no paresthesias  EYES: no discharge, + pruritus, no erythema  EARS: + hearing loss, + sensation of fullness  NOSE: + congestion, no rhinorrhea, no itching, no sneezing  PULM: no SOB, no wheezing, no cough  CV: no CP, no palpitations, no leg swelling  GI: no dysphagia, no heartburn, no pain, no N/V/D  MSK: no joint pain, no muscle pain  DERM: no rashes, no skin breaks     MEDICAL HISTORY     MedHx: active problems reviewed  SurgHx: no ENT surgeries  SocHx: nonsmoker  FamHx: sister with sinus issues  Allergies: see below  Medications: MAR reviewed  Vaccines: UTD    H/o Asthma: denies  H/o Eczema: denies  H/o  "Rhinitis: yes  Oral Allergy:  denies  Food Allergy: denies  Venom Allergy: denies  Latex Allergy: denies  Other Allergies: denies  Env/Occ: works with industrial chemicals, plans to retire Fall 2020     PHYSICAL EXAM     VS: Pulse 68   Ht 5' 7" (1.702 m)   Wt 79.9 kg (176 lb 4.1 oz)   SpO2 98%   BMI 27.61 kg/m²   GENERAL: alert, NAD, well-appearing, cooperative  EYES: PERRL, EOMI, no conjunctival injection, no discharge, no infraorbital shiners  EARS: external auditory canals normal B/L, TM normal B/L  NOSE: NT 2-3+ and pink B/L, no stringing mucous, no polyps  ORAL: MMM, no ulcers, no thrush, no cobblestoning  NECK: supple, trachea midline, no thyromegaly, no LAD  LUNGS: CTAB, no w/r/c, no increased WOB  HEART: RRR, normal S1/S2, no m/g/r  EXTREMITIES: +2 distal pulses, no c/c/e  LYMPHATICS: no cervical/submandibular LAD  DERM: no rashes, no skin breaks, no dystrophic fingernails  NEURO: normal gait, no facial asymmetry     ALLERGEN TESTING     Immunocaps: 2013 - negative to all aeroallergens tested     IMAGING & OTHER DIAGNOSTICS     Sinus CT in 2012 shows chronic sinusitis in maxillary, ethmoid, sphenoid sinuses     ASSESSMENT & PLAN     Ang Oden Jr. is a 64 y.o. male with     Chronic rhinosinusitis  Nonallergic rhinitis  Headaches, likely due to sinus infection    Plan:   Augmentin x 21 days per guidelines for chronic sinusitis treatment  Flonase 2 SEN BID  Azelastine 1 SEN BID  Sudafed as needed for significant headaches, reviewed side effects of this medication, including increased blood pressure.    Follow up: 3 weeks, if no improvement, would order CT sinuses    Tri Bartlett MD  Allergy/Immunology      "

## 2020-07-14 ENCOUNTER — HOSPITAL ENCOUNTER (OUTPATIENT)
Dept: RADIOLOGY | Facility: HOSPITAL | Age: 65
Discharge: HOME | End: 2020-07-14
Attending: UROLOGY
Payer: COMMERCIAL

## 2020-07-14 ENCOUNTER — OFFICE VISIT (OUTPATIENT)
Dept: UROLOGY | Facility: CLINIC | Age: 65
End: 2020-07-14
Payer: COMMERCIAL

## 2020-07-14 VITALS
BODY MASS INDEX: 27.02 KG/M2 | RESPIRATION RATE: 18 BRPM | WEIGHT: 172.19 LBS | DIASTOLIC BLOOD PRESSURE: 68 MMHG | SYSTOLIC BLOOD PRESSURE: 107 MMHG | HEART RATE: 67 BPM | HEIGHT: 67 IN | TEMPERATURE: 98 F

## 2020-07-14 DIAGNOSIS — N40.0 BENIGN PROSTATIC HYPERPLASIA WITHOUT LOWER URINARY TRACT SYMPTOMS: ICD-10-CM

## 2020-07-14 DIAGNOSIS — N20.0 KIDNEY STONE: Primary | ICD-10-CM

## 2020-07-14 DIAGNOSIS — N20.0 KIDNEY STONE: ICD-10-CM

## 2020-07-14 LAB
BILIRUB SERPL-MCNC: NORMAL MG/DL
BLOOD URINE, POC: NORMAL
CLARITY, POC UA: CLEAR
COLOR, POC UA: YELLOW
GLUCOSE UR QL STRIP: NORMAL
KETONES UR QL STRIP: NORMAL
LEUKOCYTE ESTERASE URINE, POC: NORMAL
NITRITE, POC UA: NORMAL
PH, POC UA: 5.5
PROTEIN, POC: NORMAL
SPECIFIC GRAVITY, POC UA: 1.03
UROBILINOGEN, POC UA: 0.2

## 2020-07-14 PROCEDURE — 74018 XR ABDOMEN AP 1 VIEW: ICD-10-PCS | Mod: 26,,, | Performed by: RADIOLOGY

## 2020-07-14 PROCEDURE — 1101F PR PT FALLS ASSESS DOC 0-1 FALLS W/OUT INJ PAST YR: ICD-10-PCS | Mod: CPTII,S$GLB,, | Performed by: UROLOGY

## 2020-07-14 PROCEDURE — 74018 RADEX ABDOMEN 1 VIEW: CPT | Mod: 26,,, | Performed by: RADIOLOGY

## 2020-07-14 PROCEDURE — 3074F SYST BP LT 130 MM HG: CPT | Mod: CPTII,S$GLB,, | Performed by: UROLOGY

## 2020-07-14 PROCEDURE — 81002 URINALYSIS NONAUTO W/O SCOPE: CPT | Mod: S$GLB,,, | Performed by: UROLOGY

## 2020-07-14 PROCEDURE — 99999 PR PBB SHADOW E&M-EST. PATIENT-LVL III: ICD-10-PCS | Mod: PBBFAC,,, | Performed by: UROLOGY

## 2020-07-14 PROCEDURE — 99999 PR PBB SHADOW E&M-EST. PATIENT-LVL III: CPT | Mod: PBBFAC,,, | Performed by: UROLOGY

## 2020-07-14 PROCEDURE — 3008F BODY MASS INDEX DOCD: CPT | Mod: CPTII,S$GLB,, | Performed by: UROLOGY

## 2020-07-14 PROCEDURE — 81002 POCT URINE DIPSTICK WITHOUT MICROSCOPE: ICD-10-PCS | Mod: S$GLB,,, | Performed by: UROLOGY

## 2020-07-14 PROCEDURE — 74018 RADEX ABDOMEN 1 VIEW: CPT | Mod: TC,FY

## 2020-07-14 PROCEDURE — 99214 OFFICE O/P EST MOD 30 MIN: CPT | Mod: 25,S$GLB,, | Performed by: UROLOGY

## 2020-07-14 PROCEDURE — 3008F PR BODY MASS INDEX (BMI) DOCUMENTED: ICD-10-PCS | Mod: CPTII,S$GLB,, | Performed by: UROLOGY

## 2020-07-14 PROCEDURE — 3078F PR MOST RECENT DIASTOLIC BLOOD PRESSURE < 80 MM HG: ICD-10-PCS | Mod: CPTII,S$GLB,, | Performed by: UROLOGY

## 2020-07-14 PROCEDURE — 3078F DIAST BP <80 MM HG: CPT | Mod: CPTII,S$GLB,, | Performed by: UROLOGY

## 2020-07-14 PROCEDURE — 99214 PR OFFICE/OUTPT VISIT, EST, LEVL IV, 30-39 MIN: ICD-10-PCS | Mod: 25,S$GLB,, | Performed by: UROLOGY

## 2020-07-14 PROCEDURE — 3074F PR MOST RECENT SYSTOLIC BLOOD PRESSURE < 130 MM HG: ICD-10-PCS | Mod: CPTII,S$GLB,, | Performed by: UROLOGY

## 2020-07-14 PROCEDURE — 1101F PT FALLS ASSESS-DOCD LE1/YR: CPT | Mod: CPTII,S$GLB,, | Performed by: UROLOGY

## 2020-07-14 RX ORDER — HYDROCODONE BITARTRATE AND ACETAMINOPHEN 5; 325 MG/1; MG/1
1 TABLET ORAL EVERY 6 HOURS PRN
Qty: 20 TABLET | Refills: 0 | Status: SHIPPED | OUTPATIENT
Start: 2020-07-14 | End: 2020-11-17 | Stop reason: SDUPTHER

## 2020-07-14 RX ORDER — TIZANIDINE 4 MG/1
TABLET ORAL
COMMUNITY
Start: 2020-04-12 | End: 2021-01-19

## 2020-07-14 RX ORDER — TAMSULOSIN HYDROCHLORIDE 0.4 MG/1
0.4 CAPSULE ORAL DAILY
COMMUNITY
End: 2020-11-17 | Stop reason: SDUPTHER

## 2020-07-14 RX ORDER — ATORVASTATIN CALCIUM 10 MG/1
TABLET, FILM COATED ORAL
COMMUNITY
Start: 2020-05-21 | End: 2021-02-09

## 2020-07-14 NOTE — PROGRESS NOTES
OFFICE NOTE  [unfilled]  [id number]  7/14/2020       CHIEF COMPLAINT:   renal calculi     HISTORY OF PRESENT ILLNESS:   this 64-year-old male returns routine recheck.  He has a history of renal calculi has been doing well lately with no pain or discomfort.  He did request to have some pain medication available in case he has any episodes of pain.    Medical history update reveals that he has been having some recent sinus problems    Physical exam:  Abdomen - soft benign nontender no masses no hernias no organomegaly                             External genitalia - normal phallus with adequate meatus testes descended and feel normal no scrotal mass                                    PSA  - 0.85 on 01/07/2020     UA - negative pH 5.5     FINAL IMPRESSION:  Urinary calculi, BPH, history microscopic hematuria although none on today's visit       RECOMMENDATIONS:   KUB.  Norco for pain.

## 2020-08-18 ENCOUNTER — LAB VISIT (OUTPATIENT)
Dept: LAB | Facility: HOSPITAL | Age: 65
End: 2020-08-18
Attending: INTERNAL MEDICINE
Payer: COMMERCIAL

## 2020-08-18 DIAGNOSIS — R94.39 ABNORMAL BALLISTOCARDIOGRAM: Primary | ICD-10-CM

## 2020-08-18 DIAGNOSIS — E78.5 HYPERLIPEMIA: ICD-10-CM

## 2020-08-18 DIAGNOSIS — R07.9 CHEST PAIN, UNSPECIFIED: ICD-10-CM

## 2020-08-18 LAB
ALBUMIN SERPL BCP-MCNC: 3.9 G/DL (ref 3.5–5.2)
ALP SERPL-CCNC: 65 U/L (ref 55–135)
ALT SERPL W/O P-5'-P-CCNC: 21 U/L (ref 10–44)
ANION GAP SERPL CALC-SCNC: 11 MMOL/L (ref 8–16)
AST SERPL-CCNC: 21 U/L (ref 10–40)
BASOPHILS # BLD AUTO: 0.03 K/UL (ref 0–0.2)
BASOPHILS NFR BLD: 0.4 % (ref 0–1.9)
BILIRUB SERPL-MCNC: 1 MG/DL (ref 0.1–1)
BUN SERPL-MCNC: 10 MG/DL (ref 8–23)
CALCIUM SERPL-MCNC: 8.9 MG/DL (ref 8.7–10.5)
CHLORIDE SERPL-SCNC: 105 MMOL/L (ref 95–110)
CHOLEST SERPL-MCNC: 140 MG/DL (ref 120–199)
CHOLEST/HDLC SERPL: 2.9 {RATIO} (ref 2–5)
CO2 SERPL-SCNC: 26 MMOL/L (ref 23–29)
CREAT SERPL-MCNC: 0.9 MG/DL (ref 0.5–1.4)
DIFFERENTIAL METHOD: ABNORMAL
EOSINOPHIL # BLD AUTO: 0.1 K/UL (ref 0–0.5)
EOSINOPHIL NFR BLD: 1.9 % (ref 0–8)
ERYTHROCYTE [DISTWIDTH] IN BLOOD BY AUTOMATED COUNT: 13.2 % (ref 11.5–14.5)
EST. GFR  (AFRICAN AMERICAN): >60 ML/MIN/1.73 M^2
EST. GFR  (NON AFRICAN AMERICAN): >60 ML/MIN/1.73 M^2
GLUCOSE SERPL-MCNC: 78 MG/DL (ref 70–110)
HCT VFR BLD AUTO: 38.3 % (ref 40–54)
HDLC SERPL-MCNC: 48 MG/DL (ref 40–75)
HDLC SERPL: 34.3 % (ref 20–50)
HGB BLD-MCNC: 12.7 G/DL (ref 14–18)
IMM GRANULOCYTES # BLD AUTO: 0.01 K/UL (ref 0–0.04)
IMM GRANULOCYTES NFR BLD AUTO: 0.1 % (ref 0–0.5)
LDLC SERPL CALC-MCNC: 80.4 MG/DL (ref 63–159)
LYMPHOCYTES # BLD AUTO: 2.9 K/UL (ref 1–4.8)
LYMPHOCYTES NFR BLD: 41.7 % (ref 18–48)
MCH RBC QN AUTO: 31.1 PG (ref 27–31)
MCHC RBC AUTO-ENTMCNC: 33.2 G/DL (ref 32–36)
MCV RBC AUTO: 94 FL (ref 82–98)
MONOCYTES # BLD AUTO: 0.8 K/UL (ref 0.3–1)
MONOCYTES NFR BLD: 11 % (ref 4–15)
NEUTROPHILS # BLD AUTO: 3.1 K/UL (ref 1.8–7.7)
NEUTROPHILS NFR BLD: 44.9 % (ref 38–73)
NONHDLC SERPL-MCNC: 92 MG/DL
NRBC BLD-RTO: 0 /100 WBC
PLATELET # BLD AUTO: 175 K/UL (ref 150–350)
PMV BLD AUTO: 9.3 FL (ref 9.2–12.9)
POTASSIUM SERPL-SCNC: 3.6 MMOL/L (ref 3.5–5.1)
PROT SERPL-MCNC: 7 G/DL (ref 6–8.4)
RBC # BLD AUTO: 4.09 M/UL (ref 4.6–6.2)
SODIUM SERPL-SCNC: 142 MMOL/L (ref 136–145)
TRIGL SERPL-MCNC: 58 MG/DL (ref 30–150)
WBC # BLD AUTO: 6.89 K/UL (ref 3.9–12.7)

## 2020-08-18 PROCEDURE — 80061 LIPID PANEL: CPT

## 2020-08-18 PROCEDURE — 85025 COMPLETE CBC W/AUTO DIFF WBC: CPT

## 2020-08-18 PROCEDURE — 36415 COLL VENOUS BLD VENIPUNCTURE: CPT

## 2020-08-18 PROCEDURE — 80053 COMPREHEN METABOLIC PANEL: CPT

## 2020-11-16 ENCOUNTER — PATIENT OUTREACH (OUTPATIENT)
Dept: ADMINISTRATIVE | Facility: OTHER | Age: 65
End: 2020-11-16

## 2020-11-16 NOTE — PROGRESS NOTES
Chart was reviewed for overdue [facility] Encounters (BE[facility])  topics  Updates were requested from care everywhere  Health Maintenance has been updated  LINKS immunization registry triggered

## 2020-11-17 ENCOUNTER — HOSPITAL ENCOUNTER (OUTPATIENT)
Dept: RADIOLOGY | Facility: HOSPITAL | Age: 65
Discharge: HOME | End: 2020-11-17
Attending: UROLOGY
Payer: COMMERCIAL

## 2020-11-17 ENCOUNTER — OFFICE VISIT (OUTPATIENT)
Dept: UROLOGY | Facility: CLINIC | Age: 65
End: 2020-11-17
Payer: COMMERCIAL

## 2020-11-17 DIAGNOSIS — N40.0 BENIGN PROSTATIC HYPERPLASIA WITHOUT LOWER URINARY TRACT SYMPTOMS: ICD-10-CM

## 2020-11-17 DIAGNOSIS — N20.0 RENAL CALCULUS: ICD-10-CM

## 2020-11-17 DIAGNOSIS — N20.0 RENAL CALCULUS: Primary | ICD-10-CM

## 2020-11-17 PROCEDURE — 99999 PR PBB SHADOW E&M-EST. PATIENT-LVL II: CPT | Mod: PBBFAC,,, | Performed by: UROLOGY

## 2020-11-17 PROCEDURE — 74018 RADEX ABDOMEN 1 VIEW: CPT | Mod: TC,FY

## 2020-11-17 PROCEDURE — 74018 XR ABDOMEN AP 1 VIEW: ICD-10-PCS | Mod: 26,,, | Performed by: RADIOLOGY

## 2020-11-17 PROCEDURE — 74018 RADEX ABDOMEN 1 VIEW: CPT | Mod: 26,,, | Performed by: RADIOLOGY

## 2020-11-17 PROCEDURE — 99214 OFFICE O/P EST MOD 30 MIN: CPT | Mod: 25,S$GLB,, | Performed by: UROLOGY

## 2020-11-17 PROCEDURE — 81000 CHG URINALYSIS, NONAUTO, W/SCOPE: ICD-10-PCS | Mod: S$GLB,,, | Performed by: UROLOGY

## 2020-11-17 PROCEDURE — 1101F PR PT FALLS ASSESS DOC 0-1 FALLS W/OUT INJ PAST YR: ICD-10-PCS | Mod: CPTII,S$GLB,, | Performed by: UROLOGY

## 2020-11-17 PROCEDURE — 3288F PR FALLS RISK ASSESSMENT DOCUMENTED: ICD-10-PCS | Mod: CPTII,S$GLB,, | Performed by: UROLOGY

## 2020-11-17 PROCEDURE — 81000 URINALYSIS NONAUTO W/SCOPE: CPT | Mod: S$GLB,,, | Performed by: UROLOGY

## 2020-11-17 PROCEDURE — 99999 PR PBB SHADOW E&M-EST. PATIENT-LVL II: ICD-10-PCS | Mod: PBBFAC,,, | Performed by: UROLOGY

## 2020-11-17 PROCEDURE — 99214 PR OFFICE/OUTPT VISIT, EST, LEVL IV, 30-39 MIN: ICD-10-PCS | Mod: 25,S$GLB,, | Performed by: UROLOGY

## 2020-11-17 PROCEDURE — 1101F PT FALLS ASSESS-DOCD LE1/YR: CPT | Mod: CPTII,S$GLB,, | Performed by: UROLOGY

## 2020-11-17 PROCEDURE — 3288F FALL RISK ASSESSMENT DOCD: CPT | Mod: CPTII,S$GLB,, | Performed by: UROLOGY

## 2020-11-17 RX ORDER — HYDROCODONE BITARTRATE AND ACETAMINOPHEN 5; 325 MG/1; MG/1
1 TABLET ORAL EVERY 6 HOURS PRN
Qty: 20 TABLET | Refills: 0 | Status: SHIPPED | OUTPATIENT
Start: 2020-11-17 | End: 2021-01-19 | Stop reason: SDUPTHER

## 2020-11-17 RX ORDER — FLUTICASONE PROPIONATE 50 MCG
SPRAY, SUSPENSION (ML) NASAL
COMMUNITY
Start: 2020-10-27 | End: 2021-01-26

## 2020-11-17 RX ORDER — TAMSULOSIN HYDROCHLORIDE 0.4 MG/1
0.4 CAPSULE ORAL DAILY
Qty: 30 CAPSULE | Refills: 2 | Status: SHIPPED | OUTPATIENT
Start: 2020-11-17 | End: 2021-09-02

## 2020-11-17 NOTE — PROGRESS NOTES
OFFICE NOTE  [unfilled]  [id number]  11/17/2020       CHIEF COMPLAINT:   urinary calculi     HISTORY OF PRESENT ILLNESS:   this 64-year-old male returns routine recheck.  He has a history of renal calculi and he thinks he may have passed a stone several months ago as he did feel some right-sided discomfort at that time and think he may have passed the stone.  Denies any complaints on today's visit.  He also has a history BPH which continues to take Flomax and he also does have Norco available for any episodes of pain.    Other changes in his general health    Physical exam:  Abdomen - soft benign nontender no masses no hernias no organomegaly                             External genitalia - normal phallus with adequate meatus testes descended and feel normal no scrotal mass                             Rectal - 25 g smooth prostate no nodules normal sphincter tone         PSA  - 0.85 on 01/07/2020     UA - negative pH 6.0     FINAL IMPRESSION:  Urinary calculi, BPH       RECOMMENDATIONS:  KUB.  Continue on Flomax and Norco p.r.n. for pain.

## 2020-11-19 DIAGNOSIS — R10.9 ABDOMINAL PAIN, UNSPECIFIED ABDOMINAL LOCATION: ICD-10-CM

## 2020-11-19 DIAGNOSIS — N20.0 RENAL CALCULI: Primary | ICD-10-CM

## 2020-11-20 ENCOUNTER — HOSPITAL ENCOUNTER (OUTPATIENT)
Dept: RADIOLOGY | Facility: HOSPITAL | Age: 65
Discharge: HOME | End: 2020-11-20
Attending: UROLOGY
Payer: COMMERCIAL

## 2020-11-20 DIAGNOSIS — N20.0 RENAL CALCULI: ICD-10-CM

## 2020-11-20 DIAGNOSIS — R10.9 ABDOMINAL PAIN, UNSPECIFIED ABDOMINAL LOCATION: ICD-10-CM

## 2020-11-20 PROCEDURE — 74176 CT ABD & PELVIS W/O CONTRAST: CPT | Mod: 26,,, | Performed by: RADIOLOGY

## 2020-11-20 PROCEDURE — 74176 CT ABDOMEN PELVIS WITHOUT CONTRAST: ICD-10-PCS | Mod: 26,,, | Performed by: RADIOLOGY

## 2020-11-20 PROCEDURE — 74176 CT ABD & PELVIS W/O CONTRAST: CPT | Mod: TC

## 2021-01-13 ENCOUNTER — TELEPHONE (OUTPATIENT)
Dept: CARDIOLOGY | Facility: CLINIC | Age: 66
End: 2021-01-13

## 2021-01-15 ENCOUNTER — TELEPHONE (OUTPATIENT)
Dept: CARDIOLOGY | Facility: CLINIC | Age: 66
End: 2021-01-15

## 2021-01-15 ENCOUNTER — PATIENT OUTREACH (OUTPATIENT)
Dept: ADMINISTRATIVE | Facility: OTHER | Age: 66
End: 2021-01-15

## 2021-01-19 ENCOUNTER — HOSPITAL ENCOUNTER (OUTPATIENT)
Dept: RADIOLOGY | Facility: HOSPITAL | Age: 66
Discharge: HOME | End: 2021-01-19
Attending: UROLOGY
Payer: COMMERCIAL

## 2021-01-19 ENCOUNTER — OFFICE VISIT (OUTPATIENT)
Dept: UROLOGY | Facility: CLINIC | Age: 66
End: 2021-01-19
Payer: COMMERCIAL

## 2021-01-19 ENCOUNTER — TELEPHONE (OUTPATIENT)
Dept: CARDIOLOGY | Facility: CLINIC | Age: 66
End: 2021-01-19

## 2021-01-19 VITALS
DIASTOLIC BLOOD PRESSURE: 78 MMHG | SYSTOLIC BLOOD PRESSURE: 122 MMHG | BODY MASS INDEX: 27.02 KG/M2 | RESPIRATION RATE: 18 BRPM | HEIGHT: 67 IN | WEIGHT: 172.19 LBS | HEART RATE: 58 BPM

## 2021-01-19 DIAGNOSIS — N40.0 BENIGN PROSTATIC HYPERPLASIA WITHOUT LOWER URINARY TRACT SYMPTOMS: Primary | ICD-10-CM

## 2021-01-19 DIAGNOSIS — N20.0 RENAL CALCULI: ICD-10-CM

## 2021-01-19 PROCEDURE — 1126F AMNT PAIN NOTED NONE PRSNT: CPT | Mod: S$GLB,,, | Performed by: UROLOGY

## 2021-01-19 PROCEDURE — 81000 CHG URINALYSIS, NONAUTO, W/SCOPE: ICD-10-PCS | Mod: S$GLB,,, | Performed by: UROLOGY

## 2021-01-19 PROCEDURE — 3078F PR MOST RECENT DIASTOLIC BLOOD PRESSURE < 80 MM HG: ICD-10-PCS | Mod: CPTII,S$GLB,, | Performed by: UROLOGY

## 2021-01-19 PROCEDURE — 3074F SYST BP LT 130 MM HG: CPT | Mod: CPTII,S$GLB,, | Performed by: UROLOGY

## 2021-01-19 PROCEDURE — 1126F PR PAIN SEVERITY QUANTIFIED, NO PAIN PRESENT: ICD-10-PCS | Mod: S$GLB,,, | Performed by: UROLOGY

## 2021-01-19 PROCEDURE — 74018 RADEX ABDOMEN 1 VIEW: CPT | Mod: 26,,, | Performed by: RADIOLOGY

## 2021-01-19 PROCEDURE — 3008F BODY MASS INDEX DOCD: CPT | Mod: CPTII,S$GLB,, | Performed by: UROLOGY

## 2021-01-19 PROCEDURE — 3288F PR FALLS RISK ASSESSMENT DOCUMENTED: ICD-10-PCS | Mod: CPTII,S$GLB,, | Performed by: UROLOGY

## 2021-01-19 PROCEDURE — 99999 PR PBB SHADOW E&M-EST. PATIENT-LVL IV: ICD-10-PCS | Mod: PBBFAC,,, | Performed by: UROLOGY

## 2021-01-19 PROCEDURE — 74018 XR ABDOMEN AP 1 VIEW: ICD-10-PCS | Mod: 26,,, | Performed by: RADIOLOGY

## 2021-01-19 PROCEDURE — 1101F PT FALLS ASSESS-DOCD LE1/YR: CPT | Mod: CPTII,S$GLB,, | Performed by: UROLOGY

## 2021-01-19 PROCEDURE — 3288F FALL RISK ASSESSMENT DOCD: CPT | Mod: CPTII,S$GLB,, | Performed by: UROLOGY

## 2021-01-19 PROCEDURE — 1101F PR PT FALLS ASSESS DOC 0-1 FALLS W/OUT INJ PAST YR: ICD-10-PCS | Mod: CPTII,S$GLB,, | Performed by: UROLOGY

## 2021-01-19 PROCEDURE — 3078F DIAST BP <80 MM HG: CPT | Mod: CPTII,S$GLB,, | Performed by: UROLOGY

## 2021-01-19 PROCEDURE — 1159F PR MEDICATION LIST DOCUMENTED IN MEDICAL RECORD: ICD-10-PCS | Mod: S$GLB,,, | Performed by: UROLOGY

## 2021-01-19 PROCEDURE — 99214 PR OFFICE/OUTPT VISIT, EST, LEVL IV, 30-39 MIN: ICD-10-PCS | Mod: 25,S$GLB,, | Performed by: UROLOGY

## 2021-01-19 PROCEDURE — 3008F PR BODY MASS INDEX (BMI) DOCUMENTED: ICD-10-PCS | Mod: CPTII,S$GLB,, | Performed by: UROLOGY

## 2021-01-19 PROCEDURE — 99214 OFFICE O/P EST MOD 30 MIN: CPT | Mod: 25,S$GLB,, | Performed by: UROLOGY

## 2021-01-19 PROCEDURE — 74018 RADEX ABDOMEN 1 VIEW: CPT | Mod: TC,FY

## 2021-01-19 PROCEDURE — 99999 PR PBB SHADOW E&M-EST. PATIENT-LVL IV: CPT | Mod: PBBFAC,,, | Performed by: UROLOGY

## 2021-01-19 PROCEDURE — 3074F PR MOST RECENT SYSTOLIC BLOOD PRESSURE < 130 MM HG: ICD-10-PCS | Mod: CPTII,S$GLB,, | Performed by: UROLOGY

## 2021-01-19 PROCEDURE — 81000 URINALYSIS NONAUTO W/SCOPE: CPT | Mod: S$GLB,,, | Performed by: UROLOGY

## 2021-01-19 PROCEDURE — 1159F MED LIST DOCD IN RCRD: CPT | Mod: S$GLB,,, | Performed by: UROLOGY

## 2021-01-19 RX ORDER — HYDROCODONE BITARTRATE AND ACETAMINOPHEN 5; 325 MG/1; MG/1
1 TABLET ORAL EVERY 6 HOURS PRN
Qty: 16 TABLET | Refills: 0 | Status: SHIPPED | OUTPATIENT
Start: 2021-01-19 | End: 2021-05-04

## 2021-01-19 RX ORDER — HYDROCODONE BITARTRATE AND ACETAMINOPHEN 5; 325 MG/1; MG/1
1 TABLET ORAL EVERY 6 HOURS PRN
Qty: 20 TABLET | Refills: 0 | Status: SHIPPED | OUTPATIENT
Start: 2021-01-19 | End: 2021-05-04

## 2021-02-03 ENCOUNTER — LAB VISIT (OUTPATIENT)
Dept: LAB | Facility: HOSPITAL | Age: 66
End: 2021-02-03
Attending: INTERNAL MEDICINE
Payer: MEDICARE

## 2021-02-03 DIAGNOSIS — R07.9 CHEST PAIN, UNSPECIFIED: ICD-10-CM

## 2021-02-03 DIAGNOSIS — E78.5 HYPERLIPEMIA: Primary | ICD-10-CM

## 2021-02-03 LAB
ALBUMIN SERPL BCP-MCNC: 4.2 G/DL (ref 3.5–5.2)
ALP SERPL-CCNC: 57 U/L (ref 55–135)
ALT SERPL W/O P-5'-P-CCNC: 22 U/L (ref 10–44)
ANION GAP SERPL CALC-SCNC: 9 MMOL/L (ref 8–16)
AST SERPL-CCNC: 31 U/L (ref 10–40)
BASOPHILS # BLD AUTO: 0.03 K/UL (ref 0–0.2)
BASOPHILS NFR BLD: 0.5 % (ref 0–1.9)
BILIRUB SERPL-MCNC: 1.2 MG/DL (ref 0.1–1)
BUN SERPL-MCNC: 16 MG/DL (ref 8–23)
CALCIUM SERPL-MCNC: 9.9 MG/DL (ref 8.7–10.5)
CHLORIDE SERPL-SCNC: 105 MMOL/L (ref 95–110)
CHOLEST SERPL-MCNC: 187 MG/DL (ref 120–199)
CHOLEST/HDLC SERPL: 3.4 {RATIO} (ref 2–5)
CO2 SERPL-SCNC: 29 MMOL/L (ref 23–29)
CREAT SERPL-MCNC: 1 MG/DL (ref 0.5–1.4)
DIFFERENTIAL METHOD: ABNORMAL
EOSINOPHIL # BLD AUTO: 0.1 K/UL (ref 0–0.5)
EOSINOPHIL NFR BLD: 1.6 % (ref 0–8)
ERYTHROCYTE [DISTWIDTH] IN BLOOD BY AUTOMATED COUNT: 12.8 % (ref 11.5–14.5)
EST. GFR  (AFRICAN AMERICAN): >60 ML/MIN/1.73 M^2
EST. GFR  (NON AFRICAN AMERICAN): >60 ML/MIN/1.73 M^2
GLUCOSE SERPL-MCNC: 87 MG/DL (ref 70–110)
HCT VFR BLD AUTO: 42.6 % (ref 40–54)
HDLC SERPL-MCNC: 55 MG/DL (ref 40–75)
HDLC SERPL: 29.4 % (ref 20–50)
HGB BLD-MCNC: 14.8 G/DL (ref 14–18)
IMM GRANULOCYTES # BLD AUTO: 0.02 K/UL (ref 0–0.04)
IMM GRANULOCYTES NFR BLD AUTO: 0.4 % (ref 0–0.5)
LDLC SERPL CALC-MCNC: 114.8 MG/DL (ref 63–159)
LYMPHOCYTES # BLD AUTO: 1.8 K/UL (ref 1–4.8)
LYMPHOCYTES NFR BLD: 31.4 % (ref 18–48)
MCH RBC QN AUTO: 31.7 PG (ref 27–31)
MCHC RBC AUTO-ENTMCNC: 34.7 G/DL (ref 32–36)
MCV RBC AUTO: 91 FL (ref 82–98)
MONOCYTES # BLD AUTO: 0.6 K/UL (ref 0.3–1)
MONOCYTES NFR BLD: 10.2 % (ref 4–15)
NEUTROPHILS # BLD AUTO: 3.1 K/UL (ref 1.8–7.7)
NEUTROPHILS NFR BLD: 55.9 % (ref 38–73)
NONHDLC SERPL-MCNC: 132 MG/DL
NRBC BLD-RTO: 0 /100 WBC
PLATELET # BLD AUTO: 197 K/UL (ref 150–350)
PMV BLD AUTO: 9.2 FL (ref 9.2–12.9)
POTASSIUM SERPL-SCNC: 4.9 MMOL/L (ref 3.5–5.1)
PROT SERPL-MCNC: 7.5 G/DL (ref 6–8.4)
RBC # BLD AUTO: 4.67 M/UL (ref 4.6–6.2)
SODIUM SERPL-SCNC: 143 MMOL/L (ref 136–145)
TRIGL SERPL-MCNC: 86 MG/DL (ref 30–150)
WBC # BLD AUTO: 5.57 K/UL (ref 3.9–12.7)

## 2021-02-03 PROCEDURE — 36415 COLL VENOUS BLD VENIPUNCTURE: CPT

## 2021-02-03 PROCEDURE — 80061 LIPID PANEL: CPT

## 2021-02-03 PROCEDURE — 85025 COMPLETE CBC W/AUTO DIFF WBC: CPT

## 2021-02-03 PROCEDURE — 80053 COMPREHEN METABOLIC PANEL: CPT

## 2021-02-08 ENCOUNTER — HOSPITAL ENCOUNTER (OUTPATIENT)
Dept: CARDIOLOGY | Facility: CLINIC | Age: 66
Discharge: HOME | End: 2021-02-08
Attending: INTERNAL MEDICINE
Payer: MEDICARE

## 2021-02-08 VITALS — HEIGHT: 67 IN | WEIGHT: 172 LBS | BODY MASS INDEX: 27 KG/M2

## 2021-02-08 DIAGNOSIS — I10 HYPERTENSION, UNSPECIFIED TYPE: ICD-10-CM

## 2021-02-08 DIAGNOSIS — R94.31 ABNORMAL ELECTROCARDIOGRAM: ICD-10-CM

## 2021-02-08 PROCEDURE — 93306 TTE W/DOPPLER COMPLETE: CPT | Mod: S$GLB,,, | Performed by: INTERNAL MEDICINE

## 2021-02-08 PROCEDURE — 93306 ECHO (CUPID ONLY): ICD-10-PCS | Mod: S$GLB,,, | Performed by: INTERNAL MEDICINE

## 2021-02-22 ENCOUNTER — OFFICE VISIT (OUTPATIENT)
Dept: CARDIOLOGY | Facility: CLINIC | Age: 66
End: 2021-02-22
Payer: MEDICARE

## 2021-02-22 VITALS
DIASTOLIC BLOOD PRESSURE: 80 MMHG | OXYGEN SATURATION: 96 % | WEIGHT: 186 LBS | SYSTOLIC BLOOD PRESSURE: 138 MMHG | RESPIRATION RATE: 16 BRPM | HEART RATE: 72 BPM | BODY MASS INDEX: 29.19 KG/M2 | HEIGHT: 67 IN

## 2021-02-22 DIAGNOSIS — G47.33 OSA (OBSTRUCTIVE SLEEP APNEA): Chronic | ICD-10-CM

## 2021-02-22 DIAGNOSIS — Z91.89 CARDIOVASCULAR RISK FACTOR: ICD-10-CM

## 2021-02-22 DIAGNOSIS — I10 ESSENTIAL HYPERTENSION: ICD-10-CM

## 2021-02-22 DIAGNOSIS — M10.9 GOUT, ARTHRITIS: ICD-10-CM

## 2021-02-22 DIAGNOSIS — E78.2 MIXED HYPERLIPIDEMIA: ICD-10-CM

## 2021-02-22 LAB
AORTIC ROOT ANNULUS: 3.2 CM
AORTIC VALVE CUSP SEPERATION: 2.3 CM
AV INDEX (PROSTH): 0.73
AV MEAN GRADIENT: 3 MMHG
AV PEAK GRADIENT: 4 MMHG
AV VALVE AREA: 2.52 CM2
AV VELOCITY RATIO: 0.74
BSA FOR ECHO PROCEDURE: 1.92 M2
CV ECHO LV RWT: 0.41 CM
DOP CALC AO PEAK VEL: 1.06 M/S
DOP CALC AO VTI: 25.7 CM
DOP CALC LVOT AREA: 3.5 CM2
DOP CALC LVOT DIAMETER: 2.1 CM
DOP CALC LVOT PEAK VEL: 0.78 M/S
DOP CALC LVOT STROKE VOLUME: 64.74 CM3
DOP CALCLVOT PEAK VEL VTI: 18.7 CM
E WAVE DECELERATION TIME: 264 MS
E/A RATIO: 0.93
E/E' RATIO: 8.15 M/S
ECHO LV POSTERIOR WALL: 0.86 CM (ref 0.6–1.1)
FRACTIONAL SHORTENING: 41 % (ref 28–44)
INTERVENTRICULAR SEPTUM: 0.96 CM (ref 0.6–1.1)
IVRT: 116 MS
LEFT ATRIUM SIZE: 3 CM
LEFT INTERNAL DIMENSION IN SYSTOLE: 2.48 CM (ref 2.1–4)
LEFT VENTRICLE MASS INDEX: 64 G/M2
LEFT VENTRICULAR INTERNAL DIMENSION IN DIASTOLE: 4.21 CM (ref 3.5–6)
LEFT VENTRICULAR MASS: 120.94 G
LV LATERAL E/E' RATIO: 8.83 M/S
LV SEPTAL E/E' RATIO: 7.57 M/S
MV PEAK A VEL: 0.57 M/S
MV PEAK E VEL: 0.53 M/S
PISA TR MAX VEL: 2 M/S
RIGHT VENTRICULAR END-DIASTOLIC DIMENSION: 1.92 CM
TDI LATERAL: 0.06 M/S
TDI SEPTAL: 0.07 M/S
TDI: 0.07 M/S
TR MAX PG: 16 MMHG

## 2021-02-22 PROCEDURE — 3075F PR MOST RECENT SYSTOLIC BLOOD PRESS GE 130-139MM HG: ICD-10-PCS | Mod: CPTII,S$GLB,, | Performed by: INTERNAL MEDICINE

## 2021-02-22 PROCEDURE — 1126F AMNT PAIN NOTED NONE PRSNT: CPT | Mod: S$GLB,,, | Performed by: INTERNAL MEDICINE

## 2021-02-22 PROCEDURE — 3075F SYST BP GE 130 - 139MM HG: CPT | Mod: CPTII,S$GLB,, | Performed by: INTERNAL MEDICINE

## 2021-02-22 PROCEDURE — 3079F PR MOST RECENT DIASTOLIC BLOOD PRESSURE 80-89 MM HG: ICD-10-PCS | Mod: CPTII,S$GLB,, | Performed by: INTERNAL MEDICINE

## 2021-02-22 PROCEDURE — 3008F BODY MASS INDEX DOCD: CPT | Mod: CPTII,S$GLB,, | Performed by: INTERNAL MEDICINE

## 2021-02-22 PROCEDURE — 1159F MED LIST DOCD IN RCRD: CPT | Mod: S$GLB,,, | Performed by: INTERNAL MEDICINE

## 2021-02-22 PROCEDURE — 99214 PR OFFICE/OUTPT VISIT, EST, LEVL IV, 30-39 MIN: ICD-10-PCS | Mod: S$GLB,,, | Performed by: INTERNAL MEDICINE

## 2021-02-22 PROCEDURE — 3079F DIAST BP 80-89 MM HG: CPT | Mod: CPTII,S$GLB,, | Performed by: INTERNAL MEDICINE

## 2021-02-22 PROCEDURE — 1126F PR PAIN SEVERITY QUANTIFIED, NO PAIN PRESENT: ICD-10-PCS | Mod: S$GLB,,, | Performed by: INTERNAL MEDICINE

## 2021-02-22 PROCEDURE — 1159F PR MEDICATION LIST DOCUMENTED IN MEDICAL RECORD: ICD-10-PCS | Mod: S$GLB,,, | Performed by: INTERNAL MEDICINE

## 2021-02-22 PROCEDURE — 99214 OFFICE O/P EST MOD 30 MIN: CPT | Mod: S$GLB,,, | Performed by: INTERNAL MEDICINE

## 2021-02-22 PROCEDURE — 3008F PR BODY MASS INDEX (BMI) DOCUMENTED: ICD-10-PCS | Mod: CPTII,S$GLB,, | Performed by: INTERNAL MEDICINE

## 2021-04-10 RX ORDER — TRAZODONE HYDROCHLORIDE 50 MG/1
50 TABLET ORAL NIGHTLY PRN
Qty: 90 TABLET | Refills: 0 | Status: SHIPPED | OUTPATIENT
Start: 2021-04-10 | End: 2021-07-08

## 2021-05-04 ENCOUNTER — OFFICE VISIT (OUTPATIENT)
Dept: FAMILY MEDICINE | Facility: CLINIC | Age: 66
End: 2021-05-04
Payer: MEDICARE

## 2021-05-04 VITALS
DIASTOLIC BLOOD PRESSURE: 80 MMHG | SYSTOLIC BLOOD PRESSURE: 132 MMHG | HEIGHT: 67 IN | WEIGHT: 184.75 LBS | BODY MASS INDEX: 29 KG/M2 | HEART RATE: 60 BPM | OXYGEN SATURATION: 98 %

## 2021-05-04 DIAGNOSIS — J32.9 SINUSITIS, UNSPECIFIED CHRONICITY, UNSPECIFIED LOCATION: Primary | ICD-10-CM

## 2021-05-04 PROCEDURE — 99999 PR PBB SHADOW E&M-EST. PATIENT-LVL III: CPT | Mod: PBBFAC,,, | Performed by: FAMILY MEDICINE

## 2021-05-04 PROCEDURE — 99214 OFFICE O/P EST MOD 30 MIN: CPT | Mod: S$PBB,,, | Performed by: FAMILY MEDICINE

## 2021-05-04 PROCEDURE — 99214 PR OFFICE/OUTPT VISIT, EST, LEVL IV, 30-39 MIN: ICD-10-PCS | Mod: S$PBB,,, | Performed by: FAMILY MEDICINE

## 2021-05-04 PROCEDURE — 99213 OFFICE O/P EST LOW 20 MIN: CPT | Mod: PBBFAC,PO,25 | Performed by: FAMILY MEDICINE

## 2021-05-04 PROCEDURE — 96372 THER/PROPH/DIAG INJ SC/IM: CPT | Mod: PBBFAC,PO

## 2021-05-04 PROCEDURE — 99999 PR PBB SHADOW E&M-EST. PATIENT-LVL III: ICD-10-PCS | Mod: PBBFAC,,, | Performed by: FAMILY MEDICINE

## 2021-05-04 RX ORDER — AZITHROMYCIN 250 MG/1
250 TABLET, FILM COATED ORAL DAILY
COMMUNITY
End: 2021-09-02

## 2021-05-04 RX ORDER — MELOXICAM 15 MG/1
15 TABLET ORAL DAILY
Qty: 10 TABLET | Refills: 0 | Status: SHIPPED | OUTPATIENT
Start: 2021-05-04 | End: 2021-05-14

## 2021-05-04 RX ORDER — KETOROLAC TROMETHAMINE 30 MG/ML
60 INJECTION, SOLUTION INTRAMUSCULAR; INTRAVENOUS
Status: COMPLETED | OUTPATIENT
Start: 2021-05-04 | End: 2021-05-04

## 2021-05-04 RX ADMIN — KETOROLAC TROMETHAMINE 60 MG: 60 INJECTION, SOLUTION INTRAMUSCULAR at 01:05

## 2021-05-11 ENCOUNTER — TELEPHONE (OUTPATIENT)
Dept: UROLOGY | Facility: CLINIC | Age: 66
End: 2021-05-11

## 2021-08-18 ENCOUNTER — LAB VISIT (OUTPATIENT)
Dept: LAB | Facility: HOSPITAL | Age: 66
End: 2021-08-18
Attending: INTERNAL MEDICINE
Payer: MEDICARE

## 2021-08-18 DIAGNOSIS — Z91.89 CARDIOVASCULAR RISK FACTOR: ICD-10-CM

## 2021-08-18 DIAGNOSIS — E78.2 MIXED HYPERLIPIDEMIA: ICD-10-CM

## 2021-08-18 DIAGNOSIS — G47.33 OSA (OBSTRUCTIVE SLEEP APNEA): Chronic | ICD-10-CM

## 2021-08-18 DIAGNOSIS — I10 ESSENTIAL HYPERTENSION: ICD-10-CM

## 2021-08-18 LAB
ALBUMIN SERPL BCP-MCNC: 4.1 G/DL (ref 3.5–5.2)
ALP SERPL-CCNC: 56 U/L (ref 55–135)
ALT SERPL W/O P-5'-P-CCNC: 17 U/L (ref 10–44)
ANION GAP SERPL CALC-SCNC: 10 MMOL/L (ref 8–16)
AST SERPL-CCNC: 20 U/L (ref 10–40)
BILIRUB SERPL-MCNC: 1.1 MG/DL (ref 0.1–1)
BUN SERPL-MCNC: 13 MG/DL (ref 8–23)
CALCIUM SERPL-MCNC: 9.7 MG/DL (ref 8.7–10.5)
CHLORIDE SERPL-SCNC: 106 MMOL/L (ref 95–110)
CHOLEST SERPL-MCNC: 162 MG/DL (ref 120–199)
CHOLEST/HDLC SERPL: 3.3 {RATIO} (ref 2–5)
CO2 SERPL-SCNC: 25 MMOL/L (ref 23–29)
CREAT SERPL-MCNC: 1.1 MG/DL (ref 0.5–1.4)
ERYTHROCYTE [DISTWIDTH] IN BLOOD BY AUTOMATED COUNT: 13.2 % (ref 11.5–14.5)
EST. GFR  (AFRICAN AMERICAN): >60 ML/MIN/1.73 M^2
EST. GFR  (NON AFRICAN AMERICAN): >60 ML/MIN/1.73 M^2
GLUCOSE SERPL-MCNC: 98 MG/DL (ref 70–110)
HCT VFR BLD AUTO: 40 % (ref 40–54)
HDLC SERPL-MCNC: 49 MG/DL (ref 40–75)
HDLC SERPL: 30.2 % (ref 20–50)
HGB BLD-MCNC: 13.6 G/DL (ref 14–18)
LDLC SERPL CALC-MCNC: 88.2 MG/DL (ref 63–159)
MCH RBC QN AUTO: 31.2 PG (ref 27–31)
MCHC RBC AUTO-ENTMCNC: 34 G/DL (ref 32–36)
MCV RBC AUTO: 92 FL (ref 82–98)
NONHDLC SERPL-MCNC: 113 MG/DL
PLATELET # BLD AUTO: 210 K/UL (ref 150–450)
PMV BLD AUTO: 9.4 FL (ref 9.2–12.9)
POTASSIUM SERPL-SCNC: 4.2 MMOL/L (ref 3.5–5.1)
PROT SERPL-MCNC: 7.3 G/DL (ref 6–8.4)
RBC # BLD AUTO: 4.36 M/UL (ref 4.6–6.2)
SODIUM SERPL-SCNC: 141 MMOL/L (ref 136–145)
TRIGL SERPL-MCNC: 124 MG/DL (ref 30–150)
WBC # BLD AUTO: 6.07 K/UL (ref 3.9–12.7)

## 2021-08-18 PROCEDURE — 85027 COMPLETE CBC AUTOMATED: CPT | Performed by: INTERNAL MEDICINE

## 2021-08-18 PROCEDURE — 80053 COMPREHEN METABOLIC PANEL: CPT | Performed by: INTERNAL MEDICINE

## 2021-08-18 PROCEDURE — 36415 COLL VENOUS BLD VENIPUNCTURE: CPT | Performed by: INTERNAL MEDICINE

## 2021-08-18 PROCEDURE — 80061 LIPID PANEL: CPT | Performed by: INTERNAL MEDICINE

## 2021-08-25 ENCOUNTER — OFFICE VISIT (OUTPATIENT)
Dept: CARDIOLOGY | Facility: CLINIC | Age: 66
End: 2021-08-25
Payer: MEDICARE

## 2021-08-25 VITALS
HEART RATE: 65 BPM | OXYGEN SATURATION: 98 % | DIASTOLIC BLOOD PRESSURE: 72 MMHG | SYSTOLIC BLOOD PRESSURE: 122 MMHG | HEIGHT: 67 IN | BODY MASS INDEX: 28.88 KG/M2 | WEIGHT: 184 LBS

## 2021-08-25 DIAGNOSIS — G47.33 OSA (OBSTRUCTIVE SLEEP APNEA): Primary | Chronic | ICD-10-CM

## 2021-08-25 DIAGNOSIS — E78.2 MIXED HYPERLIPIDEMIA: ICD-10-CM

## 2021-08-25 DIAGNOSIS — M10.9 GOUT, ARTHRITIS: ICD-10-CM

## 2021-08-25 DIAGNOSIS — I07.1 MILD TRICUSPID REGURGITATION: ICD-10-CM

## 2021-08-25 DIAGNOSIS — R03.0 PRE-HYPERTENSION: ICD-10-CM

## 2021-08-25 DIAGNOSIS — Z91.89 CARDIOVASCULAR RISK FACTOR: ICD-10-CM

## 2021-08-25 PROBLEM — Z00.00 ANNUAL PHYSICAL EXAM: Status: ACTIVE | Noted: 2021-08-25

## 2021-08-25 PROCEDURE — 93005 EKG 12-LEAD: ICD-10-PCS | Mod: S$GLB,,, | Performed by: INTERNAL MEDICINE

## 2021-08-25 PROCEDURE — 99214 PR OFFICE/OUTPT VISIT, EST, LEVL IV, 30-39 MIN: ICD-10-PCS | Mod: S$GLB,,, | Performed by: INTERNAL MEDICINE

## 2021-08-25 PROCEDURE — 93010 EKG 12-LEAD: ICD-10-PCS | Mod: S$GLB,,, | Performed by: GENERAL PRACTICE

## 2021-08-25 PROCEDURE — 99214 OFFICE O/P EST MOD 30 MIN: CPT | Mod: S$GLB,,, | Performed by: INTERNAL MEDICINE

## 2021-08-25 PROCEDURE — 93010 ELECTROCARDIOGRAM REPORT: CPT | Mod: S$GLB,,, | Performed by: GENERAL PRACTICE

## 2021-08-25 PROCEDURE — 93005 ELECTROCARDIOGRAM TRACING: CPT | Mod: S$GLB,,, | Performed by: INTERNAL MEDICINE

## 2021-08-25 RX ORDER — ATORVASTATIN CALCIUM 10 MG/1
10 TABLET, FILM COATED ORAL DAILY
Qty: 90 TABLET | Refills: 3 | Status: SHIPPED | OUTPATIENT
Start: 2021-08-25 | End: 2022-09-02

## 2021-09-02 ENCOUNTER — OFFICE VISIT (OUTPATIENT)
Dept: FAMILY MEDICINE | Facility: CLINIC | Age: 66
End: 2021-09-02
Payer: MEDICARE

## 2021-09-02 VITALS
SYSTOLIC BLOOD PRESSURE: 120 MMHG | OXYGEN SATURATION: 96 % | HEIGHT: 67 IN | RESPIRATION RATE: 18 BRPM | WEIGHT: 183.63 LBS | HEART RATE: 66 BPM | DIASTOLIC BLOOD PRESSURE: 78 MMHG | BODY MASS INDEX: 28.82 KG/M2 | TEMPERATURE: 98 F

## 2021-09-02 DIAGNOSIS — N40.0 BENIGN PROSTATIC HYPERPLASIA, UNSPECIFIED WHETHER LOWER URINARY TRACT SYMPTOMS PRESENT: ICD-10-CM

## 2021-09-02 DIAGNOSIS — Z23 NEED FOR PNEUMOCOCCAL VACCINATION: ICD-10-CM

## 2021-09-02 DIAGNOSIS — Z12.11 COLON CANCER SCREENING: ICD-10-CM

## 2021-09-02 DIAGNOSIS — I10 ESSENTIAL HYPERTENSION: ICD-10-CM

## 2021-09-02 DIAGNOSIS — Z13.6 ENCOUNTER FOR ABDOMINAL AORTIC ANEURYSM (AAA) SCREENING: ICD-10-CM

## 2021-09-02 DIAGNOSIS — M77.8 WRIST TENDONITIS: ICD-10-CM

## 2021-09-02 DIAGNOSIS — M10.9 GOUT, ARTHRITIS: ICD-10-CM

## 2021-09-02 DIAGNOSIS — E78.2 MIXED HYPERLIPIDEMIA: Primary | ICD-10-CM

## 2021-09-02 PROCEDURE — 99999 PR PBB SHADOW E&M-EST. PATIENT-LVL V: CPT | Mod: PBBFAC,,, | Performed by: FAMILY MEDICINE

## 2021-09-02 PROCEDURE — 99214 OFFICE O/P EST MOD 30 MIN: CPT | Mod: S$PBB,,, | Performed by: FAMILY MEDICINE

## 2021-09-02 PROCEDURE — 99214 PR OFFICE/OUTPT VISIT, EST, LEVL IV, 30-39 MIN: ICD-10-PCS | Mod: S$PBB,,, | Performed by: FAMILY MEDICINE

## 2021-09-02 PROCEDURE — 99215 OFFICE O/P EST HI 40 MIN: CPT | Mod: PBBFAC,PO | Performed by: FAMILY MEDICINE

## 2021-09-02 PROCEDURE — 99999 PR PBB SHADOW E&M-EST. PATIENT-LVL V: ICD-10-PCS | Mod: PBBFAC,,, | Performed by: FAMILY MEDICINE

## 2021-09-02 RX ORDER — HYDROCODONE BITARTRATE AND ACETAMINOPHEN 5; 325 MG/1; MG/1
1 TABLET ORAL EVERY 6 HOURS PRN
Qty: 20 TABLET | Refills: 0 | Status: ON HOLD | OUTPATIENT
Start: 2021-09-02 | End: 2021-11-11 | Stop reason: CLARIF

## 2021-09-09 ENCOUNTER — HOSPITAL ENCOUNTER (OUTPATIENT)
Dept: RADIOLOGY | Facility: HOSPITAL | Age: 66
Discharge: HOME | End: 2021-09-09
Attending: FAMILY MEDICINE
Payer: MEDICARE

## 2021-09-09 DIAGNOSIS — Z13.6 ENCOUNTER FOR ABDOMINAL AORTIC ANEURYSM (AAA) SCREENING: ICD-10-CM

## 2021-09-09 PROCEDURE — 76775 US EXAM ABDO BACK WALL LIM: CPT | Mod: TC

## 2021-09-09 PROCEDURE — 76775 US ABDOMINAL AORTA: ICD-10-PCS | Mod: 26,,, | Performed by: RADIOLOGY

## 2021-09-09 PROCEDURE — 76775 US EXAM ABDO BACK WALL LIM: CPT | Mod: 26,,, | Performed by: RADIOLOGY

## 2021-09-23 ENCOUNTER — OFFICE VISIT (OUTPATIENT)
Dept: ORTHOPEDICS | Facility: CLINIC | Age: 66
End: 2021-09-23
Payer: MEDICARE

## 2021-09-23 ENCOUNTER — PATIENT OUTREACH (OUTPATIENT)
Dept: ADMINISTRATIVE | Facility: OTHER | Age: 66
End: 2021-09-23

## 2021-09-23 ENCOUNTER — HOSPITAL ENCOUNTER (OUTPATIENT)
Dept: RADIOLOGY | Facility: HOSPITAL | Age: 66
Discharge: HOME | End: 2021-09-23
Attending: ORTHOPAEDIC SURGERY
Payer: MEDICARE

## 2021-09-23 VITALS — BODY MASS INDEX: 28.72 KG/M2 | HEIGHT: 67 IN | WEIGHT: 183 LBS

## 2021-09-23 DIAGNOSIS — M25.532 ARTHRALGIA OF LEFT WRIST: Primary | ICD-10-CM

## 2021-09-23 DIAGNOSIS — M19.031 ARTHRITIS OF RIGHT WRIST: ICD-10-CM

## 2021-09-23 DIAGNOSIS — M77.8 WRIST TENDONITIS: ICD-10-CM

## 2021-09-23 DIAGNOSIS — M25.532 LEFT WRIST PAIN: ICD-10-CM

## 2021-09-23 DIAGNOSIS — M25.532 LEFT WRIST PAIN: Primary | ICD-10-CM

## 2021-09-23 DIAGNOSIS — M25.539 WRIST PAIN, ACUTE, UNSPECIFIED LATERALITY: ICD-10-CM

## 2021-09-23 PROCEDURE — 73110 PR  X-RAY WRIST 3+ VW: ICD-10-PCS | Mod: 26,LT,, | Performed by: RADIOLOGY

## 2021-09-23 PROCEDURE — 20605 DRAIN/INJ JOINT/BURSA W/O US: CPT | Mod: PBBFAC,PN | Performed by: ORTHOPAEDIC SURGERY

## 2021-09-23 PROCEDURE — 73110 X-RAY EXAM OF WRIST: CPT | Mod: TC,50,PN

## 2021-09-23 PROCEDURE — 99203 PR OFFICE/OUTPT VISIT, NEW, LEVL III, 30-44 MIN: ICD-10-PCS | Mod: S$PBB,25,, | Performed by: ORTHOPAEDIC SURGERY

## 2021-09-23 PROCEDURE — 73110 X-RAY EXAM OF WRIST: CPT | Mod: 26,LT,, | Performed by: RADIOLOGY

## 2021-09-23 PROCEDURE — 73110 X-RAY EXAM OF WRIST: CPT | Mod: 26,RT,, | Performed by: RADIOLOGY

## 2021-09-23 PROCEDURE — 20605 INTERMEDIATE JOINT ASPIRATION/INJECTION: R RADIOCARPAL: ICD-10-PCS | Mod: S$PBB,50,, | Performed by: ORTHOPAEDIC SURGERY

## 2021-09-23 PROCEDURE — 99203 OFFICE O/P NEW LOW 30 MIN: CPT | Mod: S$PBB,25,, | Performed by: ORTHOPAEDIC SURGERY

## 2021-09-23 PROCEDURE — 99999 PR PBB SHADOW E&M-EST. PATIENT-LVL III: CPT | Mod: PBBFAC,,, | Performed by: ORTHOPAEDIC SURGERY

## 2021-09-23 PROCEDURE — 99213 OFFICE O/P EST LOW 20 MIN: CPT | Mod: PBBFAC,PN,25 | Performed by: ORTHOPAEDIC SURGERY

## 2021-09-23 PROCEDURE — 99999 PR PBB SHADOW E&M-EST. PATIENT-LVL III: ICD-10-PCS | Mod: PBBFAC,,, | Performed by: ORTHOPAEDIC SURGERY

## 2021-09-23 RX ORDER — TRIAMCINOLONE ACETONIDE 40 MG/ML
20 INJECTION, SUSPENSION INTRA-ARTICULAR; INTRAMUSCULAR
Status: DISCONTINUED | OUTPATIENT
Start: 2021-09-23 | End: 2021-09-23 | Stop reason: HOSPADM

## 2021-09-23 RX ORDER — MELOXICAM 15 MG/1
15 TABLET ORAL DAILY
Qty: 30 TABLET | Refills: 1 | Status: SHIPPED | OUTPATIENT
Start: 2021-09-23 | End: 2021-10-20

## 2021-09-23 RX ADMIN — TRIAMCINOLONE ACETONIDE 20 MG: 40 INJECTION, SUSPENSION INTRA-ARTICULAR; INTRAMUSCULAR at 03:09

## 2021-09-24 ENCOUNTER — PATIENT MESSAGE (OUTPATIENT)
Dept: FAMILY MEDICINE | Facility: CLINIC | Age: 66
End: 2021-09-24

## 2021-09-27 ENCOUNTER — TELEPHONE (OUTPATIENT)
Dept: FAMILY MEDICINE | Facility: CLINIC | Age: 66
End: 2021-09-27

## 2021-09-27 DIAGNOSIS — F41.1 GAD (GENERALIZED ANXIETY DISORDER): ICD-10-CM

## 2021-09-29 RX ORDER — CITALOPRAM 20 MG/1
20 TABLET, FILM COATED ORAL DAILY
Qty: 90 TABLET | Refills: 1 | Status: SHIPPED | OUTPATIENT
Start: 2021-09-29 | End: 2022-03-28

## 2021-10-25 ENCOUNTER — TELEPHONE (OUTPATIENT)
Dept: FAMILY MEDICINE | Facility: CLINIC | Age: 66
End: 2021-10-25
Payer: MEDICARE

## 2021-10-25 DIAGNOSIS — Z12.11 COLON CANCER SCREENING: Primary | ICD-10-CM

## 2021-11-11 ENCOUNTER — HOSPITAL ENCOUNTER (OUTPATIENT)
Facility: HOSPITAL | Age: 66
Discharge: HOME | End: 2021-11-11
Attending: INTERNAL MEDICINE | Admitting: INTERNAL MEDICINE
Payer: MEDICARE

## 2021-11-11 ENCOUNTER — ANESTHESIA (OUTPATIENT)
Dept: ENDOSCOPY | Facility: HOSPITAL | Age: 66
End: 2021-11-11
Payer: MEDICARE

## 2021-11-11 ENCOUNTER — ANESTHESIA EVENT (OUTPATIENT)
Dept: ENDOSCOPY | Facility: HOSPITAL | Age: 66
End: 2021-11-11
Payer: MEDICARE

## 2021-11-11 DIAGNOSIS — K64.8 INTERNAL HEMORRHOIDS: ICD-10-CM

## 2021-11-11 DIAGNOSIS — K57.90 DIVERTICULOSIS: Chronic | ICD-10-CM

## 2021-11-11 DIAGNOSIS — Z86.010 HX OF COLONIC POLYPS: ICD-10-CM

## 2021-11-11 DIAGNOSIS — K63.5 POLYP OF COLON, UNSPECIFIED PART OF COLON, UNSPECIFIED TYPE: Primary | ICD-10-CM

## 2021-11-11 PROCEDURE — 45385 COLONOSCOPY W/LESION REMOVAL: CPT | Mod: PT | Performed by: INTERNAL MEDICINE

## 2021-11-11 PROCEDURE — 25000003 PHARM REV CODE 250: Performed by: INTERNAL MEDICINE

## 2021-11-11 PROCEDURE — 63600175 PHARM REV CODE 636 W HCPCS: Performed by: NURSE ANESTHETIST, CERTIFIED REGISTERED

## 2021-11-11 PROCEDURE — 27201089 HC SNARE, DISP (ANY): Performed by: INTERNAL MEDICINE

## 2021-11-11 PROCEDURE — D9220A PRA ANESTHESIA: ICD-10-PCS | Mod: PT,CRNA,, | Performed by: NURSE ANESTHETIST, CERTIFIED REGISTERED

## 2021-11-11 PROCEDURE — D9220A PRA ANESTHESIA: ICD-10-PCS | Mod: PT,ANES,, | Performed by: ANESTHESIOLOGY

## 2021-11-11 PROCEDURE — 88305 TISSUE EXAM BY PATHOLOGIST: CPT | Mod: 26,,, | Performed by: PATHOLOGY

## 2021-11-11 PROCEDURE — 88305 TISSUE EXAM BY PATHOLOGIST: ICD-10-PCS | Mod: 26,,, | Performed by: PATHOLOGY

## 2021-11-11 PROCEDURE — 88305 TISSUE EXAM BY PATHOLOGIST: CPT | Performed by: PATHOLOGY

## 2021-11-11 PROCEDURE — D9220A PRA ANESTHESIA: Mod: PT,CRNA,, | Performed by: NURSE ANESTHETIST, CERTIFIED REGISTERED

## 2021-11-11 PROCEDURE — D9220A PRA ANESTHESIA: Mod: PT,ANES,, | Performed by: ANESTHESIOLOGY

## 2021-11-11 PROCEDURE — 25000003 PHARM REV CODE 250: Performed by: NURSE ANESTHETIST, CERTIFIED REGISTERED

## 2021-11-11 PROCEDURE — 45385 COLONOSCOPY W/LESION REMOVAL: CPT | Mod: PT,,, | Performed by: INTERNAL MEDICINE

## 2021-11-11 PROCEDURE — 37000009 HC ANESTHESIA EA ADD 15 MINS: Performed by: INTERNAL MEDICINE

## 2021-11-11 PROCEDURE — 45385 PR COLONOSCOPY,REMV LESN,SNARE: ICD-10-PCS | Mod: PT,,, | Performed by: INTERNAL MEDICINE

## 2021-11-11 PROCEDURE — 37000008 HC ANESTHESIA 1ST 15 MINUTES: Performed by: INTERNAL MEDICINE

## 2021-11-11 RX ORDER — TAMSULOSIN HYDROCHLORIDE 0.4 MG/1
CAPSULE ORAL DAILY
COMMUNITY
End: 2022-07-07

## 2021-11-11 RX ORDER — PROPOFOL 10 MG/ML
VIAL (ML) INTRAVENOUS
Status: DISCONTINUED | OUTPATIENT
Start: 2021-11-11 | End: 2021-11-11

## 2021-11-11 RX ORDER — SODIUM CHLORIDE 9 MG/ML
INJECTION, SOLUTION INTRAVENOUS CONTINUOUS
Status: DISCONTINUED | OUTPATIENT
Start: 2021-11-11 | End: 2021-11-11 | Stop reason: HOSPADM

## 2021-11-11 RX ORDER — LIDOCAINE HCL/PF 100 MG/5ML
SYRINGE (ML) INTRAVENOUS
Status: DISCONTINUED | OUTPATIENT
Start: 2021-11-11 | End: 2021-11-11

## 2021-11-11 RX ADMIN — PROPOFOL 100 MG: 10 INJECTION, EMULSION INTRAVENOUS at 10:11

## 2021-11-11 RX ADMIN — PROPOFOL 30 MG: 10 INJECTION, EMULSION INTRAVENOUS at 10:11

## 2021-11-11 RX ADMIN — LIDOCAINE HYDROCHLORIDE 100 MG: 20 INJECTION INTRAVENOUS at 10:11

## 2021-11-11 RX ADMIN — SODIUM CHLORIDE: 0.9 INJECTION, SOLUTION INTRAVENOUS at 10:11

## 2021-11-12 VITALS
OXYGEN SATURATION: 96 % | WEIGHT: 170 LBS | BODY MASS INDEX: 26.63 KG/M2 | DIASTOLIC BLOOD PRESSURE: 74 MMHG | RESPIRATION RATE: 16 BRPM | SYSTOLIC BLOOD PRESSURE: 108 MMHG | TEMPERATURE: 98 F | HEART RATE: 70 BPM

## 2021-11-24 LAB
FINAL PATHOLOGIC DIAGNOSIS: NORMAL
GROSS: NORMAL
Lab: NORMAL

## 2021-11-29 PROBLEM — Z00.00 ANNUAL PHYSICAL EXAM: Status: RESOLVED | Noted: 2021-08-25 | Resolved: 2021-11-29

## 2021-11-30 ENCOUNTER — TELEPHONE (OUTPATIENT)
Dept: GASTROENTEROLOGY | Facility: CLINIC | Age: 66
End: 2021-11-30
Payer: MEDICARE

## 2022-01-07 DIAGNOSIS — M25.532 ARTHRALGIA OF LEFT WRIST: ICD-10-CM

## 2022-01-07 DIAGNOSIS — M19.031 ARTHRITIS OF RIGHT WRIST: ICD-10-CM

## 2022-01-07 RX ORDER — MELOXICAM 15 MG/1
15 TABLET ORAL DAILY
Qty: 30 TABLET | Refills: 2 | Status: SHIPPED | OUTPATIENT
Start: 2022-01-07 | End: 2022-03-30

## 2022-01-07 NOTE — TELEPHONE ENCOUNTER
----- Message from Ijeoma Siu sent at 1/7/2022 12:30 PM CST -----  Regarding: Refill  Contact: Pt [phone]  Rx Refill/Request    Is this a Refill or New Rx:  Refill    Rx Name and Strength:  meloxicam (MOBIC) 15 MG tablet # NEED 90 DAY SUPPLY#    Preferred Pharmacy with phone number:    [facility]/pharmacy #[id number] - [place] - [address]  [address] [address]  Phone: [phone] Fax: [phone]      Communication Preference:  [phone]        Additional Information:   Please fill at 90 day supply due to insurance not covering for 30 days.

## 2022-02-07 ENCOUNTER — TELEPHONE (OUTPATIENT)
Dept: FAMILY MEDICINE | Facility: CLINIC | Age: 67
End: 2022-02-07
Payer: MEDICARE

## 2022-02-07 DIAGNOSIS — G47.33 OSA ON CPAP: Primary | ICD-10-CM

## 2022-02-07 NOTE — TELEPHONE ENCOUNTER
Pt requesting an order for a new cpap machine and supplies. Will forward message to provider for further assistance.

## 2022-02-07 NOTE — TELEPHONE ENCOUNTER
----- Message from Ty CLARK Brendenmk sent at 2/7/2022  3:15 PM CST -----  Contact: patient  Type:  RX Refill Request    Who Called:  patient  Refill or New Rx:  new    C-Pap Supplies for new machine, Dream Station 2 Auto CPAP Advance  Chin Strap    Preferred Pharmacy with phone number:    [facility]  Phone number: [phone]    Ordering Provider:  Monica Freedman Call Back Number:  [phone]  Additional Information:  n/a

## 2022-02-11 ENCOUNTER — TELEPHONE (OUTPATIENT)
Dept: FAMILY MEDICINE | Facility: CLINIC | Age: 67
End: 2022-02-11
Payer: MEDICARE

## 2022-03-16 ENCOUNTER — TELEPHONE (OUTPATIENT)
Dept: FAMILY MEDICINE | Facility: CLINIC | Age: 67
End: 2022-03-16
Payer: MEDICARE

## 2022-03-27 DIAGNOSIS — F41.1 GAD (GENERALIZED ANXIETY DISORDER): ICD-10-CM

## 2022-03-27 NOTE — TELEPHONE ENCOUNTER
No new care gaps identified.  Powered by [employer]. Reference number: [id number].   3/27/2022 7:27:14 AM CDT

## 2022-03-28 ENCOUNTER — TELEPHONE (OUTPATIENT)
Dept: FAMILY MEDICINE | Facility: CLINIC | Age: 67
End: 2022-03-28
Payer: MEDICARE

## 2022-03-28 RX ORDER — CITALOPRAM 20 MG/1
TABLET, FILM COATED ORAL
Qty: 90 TABLET | Refills: 1 | Status: SHIPPED | OUTPATIENT
Start: 2022-03-28 | End: 2022-09-21

## 2022-04-11 ENCOUNTER — LAB VISIT (OUTPATIENT)
Dept: LAB | Facility: HOSPITAL | Age: 67
End: 2022-04-11
Attending: INTERNAL MEDICINE
Payer: MEDICARE

## 2022-04-11 DIAGNOSIS — G47.33 OSA (OBSTRUCTIVE SLEEP APNEA): Chronic | ICD-10-CM

## 2022-04-11 DIAGNOSIS — M10.9 GOUT, ARTHRITIS: ICD-10-CM

## 2022-04-11 DIAGNOSIS — Z91.89 CARDIOVASCULAR RISK FACTOR: ICD-10-CM

## 2022-04-11 DIAGNOSIS — I07.1 MILD TRICUSPID REGURGITATION: ICD-10-CM

## 2022-04-11 DIAGNOSIS — E78.2 MIXED HYPERLIPIDEMIA: ICD-10-CM

## 2022-04-11 DIAGNOSIS — R03.0 PRE-HYPERTENSION: ICD-10-CM

## 2022-04-11 LAB
ALBUMIN SERPL BCP-MCNC: 3.7 G/DL (ref 3.5–5.2)
ALP SERPL-CCNC: 46 U/L (ref 55–135)
ALT SERPL W/O P-5'-P-CCNC: 21 U/L (ref 10–44)
ANION GAP SERPL CALC-SCNC: 8 MMOL/L (ref 8–16)
AST SERPL-CCNC: 23 U/L (ref 10–40)
BILIRUB SERPL-MCNC: 0.6 MG/DL (ref 0.1–1)
BUN SERPL-MCNC: 13 MG/DL (ref 8–23)
CALCIUM SERPL-MCNC: 9.2 MG/DL (ref 8.7–10.5)
CHLORIDE SERPL-SCNC: 109 MMOL/L (ref 95–110)
CHOLEST SERPL-MCNC: 150 MG/DL (ref 120–199)
CHOLEST/HDLC SERPL: 3.7 {RATIO} (ref 2–5)
CO2 SERPL-SCNC: 25 MMOL/L (ref 23–29)
CREAT SERPL-MCNC: 1 MG/DL (ref 0.5–1.4)
ERYTHROCYTE [DISTWIDTH] IN BLOOD BY AUTOMATED COUNT: 13.4 % (ref 11.5–14.5)
EST. GFR  (AFRICAN AMERICAN): >60 ML/MIN/1.73 M^2
EST. GFR  (NON AFRICAN AMERICAN): >60 ML/MIN/1.73 M^2
GLUCOSE SERPL-MCNC: 106 MG/DL (ref 70–110)
HCT VFR BLD AUTO: 39.2 % (ref 40–54)
HDLC SERPL-MCNC: 41 MG/DL (ref 40–75)
HDLC SERPL: 27.3 % (ref 20–50)
HGB BLD-MCNC: 13 G/DL (ref 14–18)
LDLC SERPL CALC-MCNC: 91.6 MG/DL (ref 63–159)
MCH RBC QN AUTO: 31.3 PG (ref 27–31)
MCHC RBC AUTO-ENTMCNC: 33.2 G/DL (ref 32–36)
MCV RBC AUTO: 95 FL (ref 82–98)
NONHDLC SERPL-MCNC: 109 MG/DL
PLATELET # BLD AUTO: 191 K/UL (ref 150–450)
PMV BLD AUTO: 10 FL (ref 9.2–12.9)
POTASSIUM SERPL-SCNC: 4.7 MMOL/L (ref 3.5–5.1)
PROT SERPL-MCNC: 6.6 G/DL (ref 6–8.4)
RBC # BLD AUTO: 4.15 M/UL (ref 4.6–6.2)
SODIUM SERPL-SCNC: 142 MMOL/L (ref 136–145)
TRIGL SERPL-MCNC: 87 MG/DL (ref 30–150)
WBC # BLD AUTO: 6.12 K/UL (ref 3.9–12.7)

## 2022-04-11 PROCEDURE — 80061 LIPID PANEL: CPT | Performed by: INTERNAL MEDICINE

## 2022-04-11 PROCEDURE — 85027 COMPLETE CBC AUTOMATED: CPT | Performed by: INTERNAL MEDICINE

## 2022-04-11 PROCEDURE — 80053 COMPREHEN METABOLIC PANEL: CPT | Performed by: INTERNAL MEDICINE

## 2022-04-11 PROCEDURE — 36415 COLL VENOUS BLD VENIPUNCTURE: CPT | Performed by: INTERNAL MEDICINE

## 2022-04-12 ENCOUNTER — TELEPHONE (OUTPATIENT)
Dept: CARDIOLOGY | Facility: CLINIC | Age: 67
End: 2022-04-12
Payer: MEDICARE

## 2022-06-09 ENCOUNTER — TELEPHONE (OUTPATIENT)
Dept: CARDIOLOGY | Facility: CLINIC | Age: 67
End: 2022-06-09
Payer: MEDICARE

## 2022-06-09 NOTE — TELEPHONE ENCOUNTER
----- Message from Sadia Ocasio sent at 6/9/2022  4:57 PM CDT -----  Contact: JAMIL ARCE JR. [[id number]]  Type: Patient Call Back    Who called:JAMIL ARCE JR. [[id number]]    What is the request in detail: The patient would like to reschedule his 6mnth follow up     Can the clinic reply by [facility]?    Would the patient rather a call back or a response via [facility]?     Best call back number: [phone] (mobile)    Additional Information:

## 2022-07-07 ENCOUNTER — HOSPITAL ENCOUNTER (EMERGENCY)
Facility: HOSPITAL | Age: 67
Discharge: HOME | End: 2022-07-07
Attending: EMERGENCY MEDICINE
Payer: MEDICARE

## 2022-07-07 ENCOUNTER — OFFICE VISIT (OUTPATIENT)
Dept: CARDIOLOGY | Facility: CLINIC | Age: 67
End: 2022-07-07
Payer: MEDICARE

## 2022-07-07 VITALS
SYSTOLIC BLOOD PRESSURE: 141 MMHG | BODY MASS INDEX: 26.37 KG/M2 | DIASTOLIC BLOOD PRESSURE: 75 MMHG | WEIGHT: 168 LBS | HEART RATE: 57 BPM | RESPIRATION RATE: 16 BRPM | HEIGHT: 67 IN | OXYGEN SATURATION: 98 % | TEMPERATURE: 98 F

## 2022-07-07 VITALS
OXYGEN SATURATION: 98 % | RESPIRATION RATE: 16 BRPM | BODY MASS INDEX: 27 KG/M2 | HEART RATE: 54 BPM | WEIGHT: 172 LBS | HEIGHT: 67 IN | DIASTOLIC BLOOD PRESSURE: 78 MMHG | SYSTOLIC BLOOD PRESSURE: 132 MMHG

## 2022-07-07 DIAGNOSIS — R94.31 NONSPECIFIC ABNORMAL ELECTROCARDIOGRAM (ECG) (EKG): ICD-10-CM

## 2022-07-07 DIAGNOSIS — I10 ESSENTIAL HYPERTENSION: Primary | ICD-10-CM

## 2022-07-07 DIAGNOSIS — N20.0 BILATERAL RENAL STONES: ICD-10-CM

## 2022-07-07 DIAGNOSIS — I07.1 MILD TRICUSPID REGURGITATION: ICD-10-CM

## 2022-07-07 DIAGNOSIS — G47.33 OSA (OBSTRUCTIVE SLEEP APNEA): ICD-10-CM

## 2022-07-07 DIAGNOSIS — E78.2 MIXED HYPERLIPIDEMIA: ICD-10-CM

## 2022-07-07 DIAGNOSIS — R10.9 RIGHT FLANK PAIN: Primary | ICD-10-CM

## 2022-07-07 LAB
ALBUMIN SERPL BCP-MCNC: 4 G/DL (ref 3.5–5.2)
ALP SERPL-CCNC: 65 U/L (ref 55–135)
ALT SERPL W/O P-5'-P-CCNC: 20 U/L (ref 10–44)
ANION GAP SERPL CALC-SCNC: 10 MMOL/L (ref 8–16)
AST SERPL-CCNC: 22 U/L (ref 10–40)
BASOPHILS # BLD AUTO: 0.03 K/UL (ref 0–0.2)
BASOPHILS NFR BLD: 0.6 % (ref 0–1.9)
BILIRUB SERPL-MCNC: 1 MG/DL (ref 0.1–1)
BILIRUB UR QL STRIP: NEGATIVE
BUN SERPL-MCNC: 14 MG/DL (ref 8–23)
CALCIUM SERPL-MCNC: 9.2 MG/DL (ref 8.7–10.5)
CHLORIDE SERPL-SCNC: 104 MMOL/L (ref 95–110)
CLARITY UR: CLEAR
CO2 SERPL-SCNC: 26 MMOL/L (ref 23–29)
COLOR UR: YELLOW
CREAT SERPL-MCNC: 1.1 MG/DL (ref 0.5–1.4)
DIFFERENTIAL METHOD: ABNORMAL
EOSINOPHIL # BLD AUTO: 0.1 K/UL (ref 0–0.5)
EOSINOPHIL NFR BLD: 2.2 % (ref 0–8)
ERYTHROCYTE [DISTWIDTH] IN BLOOD BY AUTOMATED COUNT: 12.8 % (ref 11.5–14.5)
EST. GFR  (AFRICAN AMERICAN): >60 ML/MIN/1.73 M^2
EST. GFR  (NON AFRICAN AMERICAN): >60 ML/MIN/1.73 M^2
GLUCOSE SERPL-MCNC: 89 MG/DL (ref 70–110)
GLUCOSE UR QL STRIP: NEGATIVE
HCT VFR BLD AUTO: 39.2 % (ref 40–54)
HGB BLD-MCNC: 14 G/DL (ref 14–18)
HGB UR QL STRIP: NEGATIVE
IMM GRANULOCYTES # BLD AUTO: 0.01 K/UL (ref 0–0.04)
IMM GRANULOCYTES NFR BLD AUTO: 0.2 % (ref 0–0.5)
KETONES UR QL STRIP: NEGATIVE
LEUKOCYTE ESTERASE UR QL STRIP: NEGATIVE
LIPASE SERPL-CCNC: 7 U/L (ref 4–60)
LYMPHOCYTES # BLD AUTO: 1.7 K/UL (ref 1–4.8)
LYMPHOCYTES NFR BLD: 31.1 % (ref 18–48)
MCH RBC QN AUTO: 32.4 PG (ref 27–31)
MCHC RBC AUTO-ENTMCNC: 35.7 G/DL (ref 32–36)
MCV RBC AUTO: 91 FL (ref 82–98)
MONOCYTES # BLD AUTO: 0.5 K/UL (ref 0.3–1)
MONOCYTES NFR BLD: 9.7 % (ref 4–15)
NEUTROPHILS # BLD AUTO: 3.1 K/UL (ref 1.8–7.7)
NEUTROPHILS NFR BLD: 56.2 % (ref 38–73)
NITRITE UR QL STRIP: NEGATIVE
NRBC BLD-RTO: 0 /100 WBC
PH UR STRIP: 6 [PH] (ref 5–8)
PLATELET # BLD AUTO: 181 K/UL (ref 150–450)
PMV BLD AUTO: 9.5 FL (ref 9.2–12.9)
POTASSIUM SERPL-SCNC: 3.8 MMOL/L (ref 3.5–5.1)
PROT SERPL-MCNC: 7 G/DL (ref 6–8.4)
PROT UR QL STRIP: NEGATIVE
RBC # BLD AUTO: 4.32 M/UL (ref 4.6–6.2)
SODIUM SERPL-SCNC: 140 MMOL/L (ref 136–145)
SP GR UR STRIP: 1.02 (ref 1–1.03)
URN SPEC COLLECT METH UR: NORMAL
UROBILINOGEN UR STRIP-ACNC: NEGATIVE EU/DL
WBC # BLD AUTO: 5.44 K/UL (ref 3.9–12.7)

## 2022-07-07 PROCEDURE — 81003 URINALYSIS AUTO W/O SCOPE: CPT | Performed by: PHYSICIAN ASSISTANT

## 2022-07-07 PROCEDURE — 25000003 PHARM REV CODE 250: Performed by: PHYSICIAN ASSISTANT

## 2022-07-07 PROCEDURE — 99214 PR OFFICE/OUTPT VISIT, EST, LEVL IV, 30-39 MIN: ICD-10-PCS | Mod: S$GLB,,, | Performed by: INTERNAL MEDICINE

## 2022-07-07 PROCEDURE — 99214 OFFICE O/P EST MOD 30 MIN: CPT | Mod: S$GLB,,, | Performed by: INTERNAL MEDICINE

## 2022-07-07 PROCEDURE — 99285 EMERGENCY DEPT VISIT HI MDM: CPT | Mod: 25

## 2022-07-07 PROCEDURE — 93000 EKG 12-LEAD: ICD-10-PCS | Mod: S$GLB,,, | Performed by: INTERNAL MEDICINE

## 2022-07-07 PROCEDURE — 63600175 PHARM REV CODE 636 W HCPCS: Performed by: PHYSICIAN ASSISTANT

## 2022-07-07 PROCEDURE — 36415 COLL VENOUS BLD VENIPUNCTURE: CPT | Performed by: PHYSICIAN ASSISTANT

## 2022-07-07 PROCEDURE — 85025 COMPLETE CBC W/AUTO DIFF WBC: CPT | Performed by: PHYSICIAN ASSISTANT

## 2022-07-07 PROCEDURE — 83690 ASSAY OF LIPASE: CPT | Performed by: PHYSICIAN ASSISTANT

## 2022-07-07 PROCEDURE — 96375 TX/PRO/DX INJ NEW DRUG ADDON: CPT

## 2022-07-07 PROCEDURE — 93000 ELECTROCARDIOGRAM COMPLETE: CPT | Mod: S$GLB,,, | Performed by: INTERNAL MEDICINE

## 2022-07-07 PROCEDURE — 96374 THER/PROPH/DIAG INJ IV PUSH: CPT

## 2022-07-07 PROCEDURE — 80053 COMPREHEN METABOLIC PANEL: CPT | Performed by: PHYSICIAN ASSISTANT

## 2022-07-07 RX ORDER — TAMSULOSIN HYDROCHLORIDE 0.4 MG/1
0.4 CAPSULE ORAL DAILY
Qty: 5 CAPSULE | Refills: 0 | Status: SHIPPED | OUTPATIENT
Start: 2022-07-07 | End: 2022-07-14 | Stop reason: SDUPTHER

## 2022-07-07 RX ORDER — HYDROCODONE BITARTRATE AND ACETAMINOPHEN 5; 325 MG/1; MG/1
1 TABLET ORAL EVERY 6 HOURS PRN
Qty: 12 TABLET | Refills: 0 | Status: SHIPPED | OUTPATIENT
Start: 2022-07-07 | End: 2022-07-10

## 2022-07-07 RX ORDER — KETOROLAC TROMETHAMINE 10 MG/1
10 TABLET, FILM COATED ORAL EVERY 6 HOURS PRN
Qty: 12 TABLET | Refills: 0 | Status: SHIPPED | OUTPATIENT
Start: 2022-07-07 | End: 2022-07-10

## 2022-07-07 RX ORDER — KETOROLAC TROMETHAMINE 30 MG/ML
15 INJECTION, SOLUTION INTRAMUSCULAR; INTRAVENOUS
Status: COMPLETED | OUTPATIENT
Start: 2022-07-07 | End: 2022-07-07

## 2022-07-07 RX ORDER — ONDANSETRON 2 MG/ML
8 INJECTION INTRAMUSCULAR; INTRAVENOUS
Status: COMPLETED | OUTPATIENT
Start: 2022-07-07 | End: 2022-07-07

## 2022-07-07 RX ORDER — ONDANSETRON 8 MG/1
8 TABLET, ORALLY DISINTEGRATING ORAL 3 TIMES DAILY PRN
Qty: 20 TABLET | Refills: 0 | Status: SHIPPED | OUTPATIENT
Start: 2022-07-07 | End: 2022-08-16

## 2022-07-07 RX ORDER — TAMSULOSIN HYDROCHLORIDE 0.4 MG/1
0.4 CAPSULE ORAL
Status: COMPLETED | OUTPATIENT
Start: 2022-07-07 | End: 2022-07-07

## 2022-07-07 RX ADMIN — SODIUM CHLORIDE 1000 ML: 0.9 INJECTION, SOLUTION INTRAVENOUS at 11:07

## 2022-07-07 RX ADMIN — KETOROLAC TROMETHAMINE 15 MG: 30 INJECTION, SOLUTION INTRAMUSCULAR; INTRAVENOUS at 12:07

## 2022-07-07 RX ADMIN — TAMSULOSIN HYDROCHLORIDE 0.4 MG: 0.4 CAPSULE ORAL at 12:07

## 2022-07-07 RX ADMIN — ONDANSETRON 8 MG: 2 INJECTION INTRAMUSCULAR; INTRAVENOUS at 12:07

## 2022-07-07 NOTE — ED PROVIDER NOTES
Encounter Date: 7/7/2022    SCRIBE #1 NOTE: Britt LONDON am scribing for, and in the presence of, Janet Wong PA-C.       History     Chief Complaint   Patient presents with    Flank Pain     right     Time seen by provider: 11:37 AM on 07/07/2022    Ang Oden Jr. is a 66 y.o. male with a PMHx of HBP, anticoagulant therapy, CKD, and kidney stone who presents to the ED for evaluation of R flank pain that onset 1 week ago.  Patient has a frequent Hx of kidney stones and states that they usually pass on their own.  He notes that he passed a small stone this morning without improvement to pain.  Pain is described as constant and radiates to the R lower abdomen.  The patient denies fever, N/V, allergies to medications or any other symptoms at this time.  Daily medications include Flomax.  PSHx includes appendectomy and colonoscopy.      The history is provided by the patient.     Review of patient's allergies indicates:   Allergen Reactions    No known drug allergies      Past Medical History:   Diagnosis Date    Anemia     Anticoagulant long-term use     Chronic kidney disease     Chronic rhinitis     Disc     lower back    DJD (degenerative joint disease)     Foot pain     left foot    Gout, unspecified     HBP (high blood pressure)     High cholesterol     Kidney stone     Sleep apnea     Weight loss     15 pounds in two months     Past Surgical History:   Procedure Laterality Date    APPENDECTOMY      COLONOSCOPY N/A 11/11/2021    Procedure: COLONOSCOPY;  Surgeon: Christian Santiago MD;  Location: [facility];  Service: Endoscopy;  Laterality: N/A;    ELBOW SURGERY      VASECTOMY       Family History   Problem Relation Age of Onset    Cancer Mother     Heart disease Mother     Heart failure Mother     Liver disease Father         secondary to alcohol    Diabetes Maternal Grandmother     Allergies Son     Asthma Son     Urolithiasis Neg Hx     Prostate cancer Neg Hx      Kidney cancer Neg Hx     Eczema Neg Hx     Immunodeficiency Neg Hx     Angioedema Neg Hx     Allergic rhinitis Neg Hx     Atopy Neg Hx     Rhinitis Neg Hx     Urticaria Neg Hx      Social History     Tobacco Use    Smoking status: Former Smoker     Quit date: 9/10/1982     Years since quittin.8    Smokeless tobacco: Never Used   Substance Use Topics    Alcohol use: Yes     Comment: About 1/2 gallon of whiskey per week.  Patient drinks daily.    Drug use: No     Review of Systems   Constitutional: Negative for chills and fever.   Eyes: Negative for discharge.   Respiratory: Negative for cough, chest tightness, shortness of breath and wheezing.    Cardiovascular: Negative for chest pain and palpitations.   Gastrointestinal: Positive for abdominal pain. Negative for diarrhea, nausea and vomiting.   Genitourinary: Positive for flank pain (right-sided). Negative for dysuria and hematuria.   Musculoskeletal: Negative for arthralgias, back pain, joint swelling, myalgias, neck pain and neck stiffness.   Skin: Negative for color change, pallor, rash and wound.   Neurological: Negative for dizziness, syncope, weakness, light-headedness, numbness and headaches.   Hematological: Does not bruise/bleed easily.   Psychiatric/Behavioral: The patient is not nervous/anxious.        Physical Exam     Initial Vitals [22 1123]   BP Pulse Resp Temp SpO2   127/79 63 16 97.8 °F (36.6 °C) 97 %      MAP       --         Physical Exam    Nursing note and vitals reviewed.  Constitutional: He appears well-developed and well-nourished. He is not diaphoretic. No distress.   HENT:   Head: Normocephalic and atraumatic.   Neck: Neck supple.   Normal range of motion.  Cardiovascular: Normal rate, regular rhythm, normal heart sounds and intact distal pulses. Exam reveals no gallop and no friction rub.    No murmur heard.  Pulmonary/Chest: Breath sounds normal. No respiratory distress. He has no wheezes. He has no rhonchi. He has  no rales.   Abdominal: Abdomen is soft. He exhibits no distension and no mass. There is abdominal tenderness.   Mild TTP noted to right flank, extending into right lower abdomen.    Musculoskeletal:         General: No tenderness or edema. Normal range of motion.      Cervical back: Normal range of motion and neck supple.     Neurological: He is alert and oriented to person, place, and time. He has normal strength. No sensory deficit.   Skin: Skin is warm and dry. No rash and no abscess noted. No erythema.   Psychiatric: He has a normal mood and affect.         ED Course   Procedures  Labs Reviewed   CBC W/ AUTO DIFFERENTIAL - Abnormal; Notable for the following components:       Result Value    RBC 4.32 (*)     Hematocrit 39.2 (*)     MCH 32.4 (*)     All other components within normal limits   COMPREHENSIVE METABOLIC PANEL   LIPASE   URINALYSIS, REFLEX TO URINE CULTURE    Narrative:     Specimen Source->Urine          Imaging Results          CT Renal Stone Study ABD Pelvis WO (Final result)  Result time 07/07/22 13:04:19    Final result by Slava Palm Jr., MD (07/07/22 13:04:19)                 Impression:      There are at least 10 small stones of the right kidney and 12 small stones of the left kidney.  Hydronephrosis is not seen on either side.  Diverticulosis coli without CT evidence of diverticulitis.  Small umbilical hernia containing fat is noted.      Electronically signed by: Slava Palm MD  Date:    07/07/2022  Time:    13:04             Narrative:    EXAMINATION:  CT RENAL STONE STUDY ABD PELVIS WO    CLINICAL HISTORY:  Flank pain, kidney stone suspected;    TECHNIQUE:  Low dose axial images, sagittal and coronal reformations were obtained from the lung bases to the pubic symphysis.  Contrast was not administered.    COMPARISON:  CT abdomen of November 20, 2020    FINDINGS:  The liver is of normal size contour and CT density without focal defect.  The gallbladder is of normal size without  CT evidence of stone.  The pancreas is of normal contour and CT density without edema or mass.  The spleen is of normal size and CT density.    The adrenal glands are not enlarged.  The kidneys are of normal size contour and CT density for a noncontrast study.  On the right or 10 intrarenal calcifications consistent with stones.  The largest measures 6 mm.  Hydronephrosis or ureteral stone is not seen.  On the left are 12 intrarenal calcifications.  The largest measures 4 mm.  Hydronephrosis or ureteral stone is not seen on this side.  The abdominal aorta and inferior vena cava are of normal caliber.    The stomach is of normal configuration.  Small bowel dilatation or air-fluid levels are not seen the.  There is diverticulosis noted in the descending and sigmoid colon without CT evidence of diverticulitis.  Distention or mass of the rest of the colon is not seen.  Free fluid or free air is not seen in the peritoneum.    The bladder is of normal contour without mass or asymmetry.  The prostate is not enlarged.  There is a small umbilical hernia containing fat                                 Medications   ketorolac injection 15 mg (15 mg Intravenous Given 7/7/22 1201)   tamsulosin 24 hr capsule 0.4 mg (0.4 mg Oral Given 7/7/22 1201)   ondansetron injection 8 mg (8 mg Intravenous Given 7/7/22 1200)   sodium chloride 0.9% bolus 1,000 mL (1,000 mLs Intravenous New Bag 7/7/22 1159)     Medical Decision Making:   History:   Old Medical Records: I decided to obtain old medical records.  Old Records Summarized: records from clinic visits and records from previous admission(s).  Differential Diagnosis:   Renal stone   Pyelonephritis   UTI   Acute abdomen   Clinical Tests:   Lab Tests: Ordered and Reviewed  Radiological Study: Ordered and Reviewed       APC / Resident Notes:   Labs stable.  No infection on UA, but there are multiple, small renal stones bilaterally without hydronephrosis.  He is feeling better after medication  given here in the ED.  He will be discharged home with prescription for pain medication and Flomax.  He will be referred to Urology for re-evaluation and further treatment options as needed. He voices understanding and is agreeable to the plan.  He is given specific return precautions.       Scribe Attestation:   Scribe #1: I performed the above scribed service and the documentation accurately describes the services I performed. I attest to the accuracy of the note.               I, Janet Wong PA-C, personally performed the services described in this documentation. All medical record entries made by the scribe were at my direction and in my presence.  I have reviewed the chart and agree that the record reflects my personal performance and is accurate and complete. Janet Wong PA-C.  8:33 PM 07/07/2022      Clinical Impression:   Final diagnoses:  [R10.9] Right flank pain (Primary)  [N20.0] Bilateral renal stones          ED Disposition Condition    Discharge Stable        ED Prescriptions     Medication Sig Dispense Start Date End Date Auth. Provider    HYDROcodone-acetaminophen (NORCO) 5-325 mg per tablet Take 1 tablet by mouth every 6 (six) hours as needed for Pain. 12 tablet 7/7/2022 7/10/2022 Janet Wong PA-C    ondansetron (ZOFRAN-ODT) 8 MG TbDL Take 1 tablet (8 mg total) by mouth 3 (three) times daily as needed (nausea). 20 tablet 7/7/2022  Janet Wong PA-C    ketorolac (TORADOL) 10 mg tablet Take 1 tablet (10 mg total) by mouth every 6 (six) hours as needed for Pain. 12 tablet 7/7/2022 7/10/2022 Janet Wong PA-C    tamsulosin (FLOMAX) 0.4 mg Cap Take 1 capsule (0.4 mg total) by mouth once daily. for 5 days 5 capsule 7/7/2022 7/12/2022 Janet Wong PA-C        Follow-up Information     Follow up With Specialties Details Why Contact Info    [facility] Emergency Dept Emergency Medicine  As needed, If symptoms worsen [address] [place] [address]  [id number]  [phone]           Janet Wong PA-C  07/07/22 2033

## 2022-07-07 NOTE — ED TRIAGE NOTES
66 y.o. male to ED with c.o. right sided flank and groin pain. Patient endorses past medical history of kidney stones and states he passed one this morning but it still having pain. Patient denies hematuria, denies fever/chills, denies chest pain/ shortness of breath.

## 2022-07-07 NOTE — PROGRESS NOTES
Subjective:    Patient ID:  Ang Oden Jr. is a 66 y.o. male     Chief Complaint   Patient presents with    Hyperlipidemia    Hypertension       HPI:  Mr Ang Oden Jr. is a 66 y.o. male is here for follow-up.  Patient has been doing well no specific complaints at the present time.  His breathing has been good denies any shortness of breath or difficulty in breathing denies any chest pain or tightness or heaviness denies any dizziness or lightheadedness or loss    Review of patient's allergies indicates:   Allergen Reactions    No known drug allergies        Past Medical History:   Diagnosis Date    Anemia     Anticoagulant long-term use     Chronic kidney disease     Chronic rhinitis     Disc     lower back    DJD (degenerative joint disease)     Foot pain     left foot    Gout, unspecified     HBP (high blood pressure)     High cholesterol     Kidney stone     Sleep apnea     Weight loss     15 pounds in two months     Past Surgical History:   Procedure Laterality Date    APPENDECTOMY      COLONOSCOPY N/A 2021    Procedure: COLONOSCOPY;  Surgeon: Christian Santiago MD;  Location[facility];  Service: Endoscopy;  Laterality: N/A;    ELBOW SURGERY      VASECTOMY       Social History     Tobacco Use    Smoking status: Former Smoker     Quit date: 9/10/1982     Years since quittin.8    Smokeless tobacco: Never Used   Substance Use Topics    Alcohol use: Yes     Comment: About 1/2 gallon of whiskey per week.  Patient drinks daily.    Drug use: No     Family History   Problem Relation Age of Onset    Cancer Mother     Heart disease Mother     Heart failure Mother     Liver disease Father         secondary to alcohol    Diabetes Maternal Grandmother     Allergies Son     Asthma Son     Urolithiasis Neg Hx     Prostate cancer Neg Hx     Kidney cancer Neg Hx     Eczema Neg Hx     Immunodeficiency Neg Hx     Angioedema Neg Hx     Allergic rhinitis Neg Hx     Atopy  Neg Hx     Rhinitis Neg Hx     Urticaria Neg Hx         Review of Systems:   Constitution: Negative for diaphoresis and fever.   HEENT: Negative for nosebleeds.    Cardiovascular: Negative for chest pain       No dyspnea on exertion       No leg swelling        No palpitations  Respiratory: Negative for shortness of breath and wheezing.    Hematologic/Lymphatic: Negative for bleeding problem. Does not bruise/bleed easily.   Skin: Negative for color change and rash.   Musculoskeletal: Negative for falls and myalgias.   Gastrointestinal: Negative for hematemesis and hematochezia.   Genitourinary: Negative for hematuria.   Neurological: Negative for dizziness and light-headedness.   Psychiatric/Behavioral: Negative for altered mental status and memory loss.          Objective:        Vitals:    07/07/22 1647   BP: 132/78   Pulse: (!) 54   Resp: 16       Lab Results   Component Value Date    WBC 5.44 07/07/2022    HGB 14.0 07/07/2022    HCT 39.2 (L) 07/07/2022     07/07/2022    CHOL 150 04/11/2022    TRIG 87 04/11/2022    HDL 41 04/11/2022    ALT 20 07/07/2022    AST 22 07/07/2022     07/07/2022    K 3.8 07/07/2022     07/07/2022    CREATININE 1.1 07/07/2022    BUN 14 07/07/2022    CO2 26 07/07/2022    TSH 1.408 07/26/2016    PSA 0.46 12/07/2018    INR 1.0 09/07/2013    HGBA1C 5.4 07/26/2016        ECHOCARDIOGRAM RESULTS  Results for orders placed during the hospital encounter of 02/08/21    Echo Color Flow Doppler? Yes    Interpretation Summary  · The left ventricle is normal in size with normal systolic function. The estimated ejection fraction is 60%  · Normal left ventricular diastolic function.  · Normal right ventricular size with normal right ventricular systolic function.  · Mild tricuspid regurgitation.        CURRENT/PREVIOUS VISIT EKG  Results for orders placed or performed in visit on 08/25/21   IN OFFICE EKG 12-LEAD (to Erin)    Collection Time: 08/25/21  1:22 PM    Narrative    Test  Reason : R03.0,    Vent. Rate : 063 BPM     Atrial Rate : 063 BPM     P-R Int : 166 ms          QRS Dur : 092 ms      QT Int : 418 ms       P-R-T Axes : 028 012 056 degrees     QTc Int : 427 ms    Normal sinus rhythm  Normal ECG  When compared with ECG of 18-JUN-2019 16:19,  No significant change was found  Confirmed by Becki GARCIA, Sunil COOK ([id number]) on 8/26/2021 3:54:49 PM    Referred By:             Confirmed By:Sunil Connell MD     No valid procedures specified.   Results for orders placed during the hospital encounter of 06/25/19    Nuc Tread Stress test - Radiologist interpreted    Interpretation Summary    The EKG portion of this study is negative for ischemia.    Arrhythmias during stress: rare PVCs.    The patient reported no chest pain during the stress test.      Physical Exam:  CONSTITUTIONAL: No fever, no chills  HEENT: Normocephalic, atraumatic,pupils reactive to light                 NECK:  No JVD no carotid bruit  CVS: S1S2+, RRR, systolic murmurs,   LUNGS: Clear  ABDOMEN: Soft, NT, BS+  EXTREMITIES: No cyanosis, edema  : No jama catheter  NEURO: AAO X 3  PSY: Normal affect      Medication List with Changes/Refills   Current Medications    ASPIRIN (ECOTRIN) 81 MG EC TABLET    Take by mouth. 1 Tablet, Delayed Release (E.C.) Oral Every day    ATORVASTATIN (LIPITOR) 10 MG TABLET    Take 1 tablet (10 mg total) by mouth once daily.    AZELASTINE (ASTELIN) 137 MCG (0.1 %) NASAL SPRAY    SPRAY 1 SPRAY IN EACH NOSTRIL 2 TIMES A DAY    CITALOPRAM (CELEXA) 20 MG TABLET    TAKE 1 TABLET BY MOUTH EVERY DAY    FLUTICASONE PROPIONATE (FLONASE) 50 MCG/ACTUATION NASAL SPRAY    1 SPRAY (50 MCG TOTAL) BY EACH NOSTRIL ROUTE ONCE DAILY.    HYDROCODONE-ACETAMINOPHEN (NORCO) 5-325 MG PER TABLET    Take 1 tablet by mouth every 6 (six) hours as needed for Pain.    KETOROLAC (TORADOL) 10 MG TABLET    Take 1 tablet (10 mg total) by mouth every 6 (six) hours as needed for Pain.    MELOXICAM (MOBIC) 15 MG TABLET    TAKE 1  TABLET BY MOUTH EVERY DAY    MOMETASONE 0.1% (ELOCON) 0.1 % CREAM    Apply topically 2 (two) times daily as needed.    ONDANSETRON (ZOFRAN-ODT) 8 MG TBDL    Take 1 tablet (8 mg total) by mouth 3 (three) times daily as needed (nausea).    TAMSULOSIN (FLOMAX) 0.4 MG CAP    Take 1 capsule (0.4 mg total) by mouth once daily. for 5 days    TRAZODONE (DESYREL) 50 MG TABLET    TAKE 1 TABLET (50 MG TOTAL) BY MOUTH NIGHTLY AS NEEDED FOR INSOMNIA.             Assessment:       1. Essential hypertension    2. Mixed hyperlipidemia    3. KYLEIGH (obstructive sleep apnea), not using CPAP    4. Mild tricuspid regurgitation    5. Nonspecific abnormal electrocardiogram (ECG) (EKG)         Plan:     1. Essential hypertension  Patient's blood pressure has been stable is 132/78 and is currently on tamsulosin 0.4 mg p.o. q.day.  continue the same.  2. Mixed hyperlipidemia   Is currently on atorvastatin 10 mg p.o. q.day continue the same and his last blood work showed his total cholesterol at 150 HDL at 41 LDL at 91 and triglycerides at 87.  3. Obstructive sleep apnea  Patient has sleep apnea and he uses intermittent CPAP.  4. EKG  Reviewed his EKG independently patient is in sinus bradycardia with heart rate of 54 beats per minute normal intervals no acute ST T-wave changes essentially within normal limits.  5. Patient to continue his current management follow up with his primary physician.  And I will see him back in the office in 1 year's time          Problem List Items Addressed This Visit        Cardiac/Vascular    Essential hypertension - Primary    Hyperlipidemia       Other    KYLEIGH (obstructive sleep apnea), not using CPAP (Chronic)      Other Visit Diagnoses     Mild tricuspid regurgitation        Nonspecific abnormal electrocardiogram (ECG) (EKG)        Relevant Orders    IN OFFICE EKG 12-LEAD (to Dike)          No follow-ups on file.

## 2022-07-13 ENCOUNTER — TELEPHONE (OUTPATIENT)
Dept: UROLOGY | Facility: CLINIC | Age: 67
End: 2022-07-13
Payer: MEDICARE

## 2022-07-13 NOTE — TELEPHONE ENCOUNTER
----- Message from Ophelia Cook, Patient Care Assistant sent at 7/13/2022  4:00 PM CDT -----  Regarding: appointment  Type:  Sooner Appointment Request    Caller is requesting a sooner appointment.  Caller declined first available appointment listed below.  Caller will not accept being placed on the waitlist and is requesting a message be sent to doctor.    Name of Caller:  pt   When is the first available appointment?  10/2022  Symptoms:  new pt ED f/u   Best Call Back Number:  [phone] (home) [phone] (work)    Additional Information:  please call pt to advise. Thanks!

## 2022-07-13 NOTE — TELEPHONE ENCOUNTER
Spoke with patient.  Patient was informed of being scheduled with the NP for his ED follow up, is scheduled on 7/14 @ 11am with the NP. Understanding was verbalized.

## 2022-07-14 ENCOUNTER — OFFICE VISIT (OUTPATIENT)
Dept: UROLOGY | Facility: CLINIC | Age: 67
End: 2022-07-14
Payer: MEDICARE

## 2022-07-14 VITALS
SYSTOLIC BLOOD PRESSURE: 121 MMHG | BODY MASS INDEX: 27.16 KG/M2 | HEIGHT: 67 IN | DIASTOLIC BLOOD PRESSURE: 78 MMHG | WEIGHT: 173.06 LBS | HEART RATE: 66 BPM | RESPIRATION RATE: 18 BRPM

## 2022-07-14 DIAGNOSIS — N20.0 NEPHROLITHIASIS: Primary | ICD-10-CM

## 2022-07-14 DIAGNOSIS — Z12.5 SCREENING FOR PROSTATE CANCER: ICD-10-CM

## 2022-07-14 DIAGNOSIS — N40.0 BPH WITHOUT OBSTRUCTION/LOWER URINARY TRACT SYMPTOMS: ICD-10-CM

## 2022-07-14 PROCEDURE — 99214 OFFICE O/P EST MOD 30 MIN: CPT | Mod: S$PBB,,, | Performed by: NURSE PRACTITIONER

## 2022-07-14 PROCEDURE — 99999 PR PBB SHADOW E&M-EST. PATIENT-LVL V: ICD-10-PCS | Mod: PBBFAC,,, | Performed by: NURSE PRACTITIONER

## 2022-07-14 PROCEDURE — 99999 PR PBB SHADOW E&M-EST. PATIENT-LVL V: CPT | Mod: PBBFAC,,, | Performed by: NURSE PRACTITIONER

## 2022-07-14 PROCEDURE — 81002 URINALYSIS NONAUTO W/O SCOPE: CPT | Mod: PBBFAC,PN | Performed by: NURSE PRACTITIONER

## 2022-07-14 PROCEDURE — 99215 OFFICE O/P EST HI 40 MIN: CPT | Mod: PBBFAC,PN | Performed by: NURSE PRACTITIONER

## 2022-07-14 PROCEDURE — 99214 PR OFFICE/OUTPT VISIT, EST, LEVL IV, 30-39 MIN: ICD-10-PCS | Mod: S$PBB,,, | Performed by: NURSE PRACTITIONER

## 2022-07-14 RX ORDER — TAMSULOSIN HYDROCHLORIDE 0.4 MG/1
0.4 CAPSULE ORAL DAILY
Qty: 30 CAPSULE | Refills: 5 | Status: SHIPPED | OUTPATIENT
Start: 2022-07-14 | End: 2022-07-14 | Stop reason: SDUPTHER

## 2022-07-14 RX ORDER — TAMSULOSIN HYDROCHLORIDE 0.4 MG/1
0.4 CAPSULE ORAL DAILY
Qty: 30 CAPSULE | Refills: 5 | Status: SHIPPED | OUTPATIENT
Start: 2022-07-14 | End: 2023-01-04

## 2022-07-14 NOTE — PROGRESS NOTES
CHIEF COMPLAINT:    Mr. Oden is a 66 y.o. male presenting for kidney stones  PRESENTING ILLNESS:    Ang dOen Jr. is a 66 y.o. male with a PMH of BPH, kidney stones, HTN, Gout who presents for kidney stones. Last clinic visit was 1/19/21 with Dr. Hartmann.    Patient was seen in ED 7/7/22 for right flank pain. Patient reported that he passed a small stone that morning. Discharged home with norco, zofran, ketorolac, and flomax.  UA negative  CT RSS: The kidneys are of normal size contour and CT density for a noncontrast study.  On the right or 10 intrarenal calcifications consistent with stones.  The largest measures 6 mm.  Hydronephrosis or ureteral stone is not seen.  On the left are 12 intrarenal calcifications.  The largest measures 4 mm.  Hydronephrosis or ureteral stone is not seen on this side.  The bladder is of normal contour without mass or asymmetry    Today 7/14/22 patient presents to clinic for f/u kidney stones. Patient reports he has passed a few stones since ED visit. Only symptom is flank pain. Denies dysuria, gross hematuria, fever, chills, nausea or vomiting. Denies difficulty voiding. He takes flomax when he is passing stone and needs refill. He also request refill for pain medication. His previous urologist would give him pain medication and flomax to keep on hand if needed for stones. He was not able to collect any stones that he recently passed. Recent CT from ED showed no hydronephrosis or ureteral stone and UA negative. Patient reports he does pass stones very often and has not needed surgery to remove stones.     Drinks: does not drink as much water as he should    3/14/19 Stone analysis resulted 100% calcium oxalate stone  He does have hx of Gout but no uric acid stone that patient is aware of. No recent Gout attacks since he stopped drinking alcohol.    Urine cultures:   Lab Results   Component Value Date    LABURIN No growth 01/07/2020     REVIEW OF SYSTEMS:    Review of  Systems    Constitutional: Negative for fever and chills.   HENT: Negative for hearing loss.   Respiratory: Negative for shortness of breath.   Cardiovascular: Negative for chest pain.   Gastrointestinal: Negative for nausea, vomiting, and constipation.   Genitourinary: See above  Neurological: Negative for dizziness.   Hematological: Does not bruise/bleed easily.   Psychiatric/Behavioral: Negative for confusion.       PATIENT HISTORY:    Past Medical History:   Diagnosis Date    Anemia     Anticoagulant long-term use     Chronic kidney disease     Chronic rhinitis     Disc     lower back    DJD (degenerative joint disease)     Foot pain     left foot    Gout, unspecified     HBP (high blood pressure)     High cholesterol     Kidney stone     Sleep apnea     Weight loss     15 pounds in two months       Past Surgical History:   Procedure Laterality Date    APPENDECTOMY      COLONOSCOPY N/A 2021    Procedure: COLONOSCOPY;  Surgeon: Christian Santiago MD;  Location: [facility];  Service: Endoscopy;  Laterality: N/A;    ELBOW SURGERY      VASECTOMY         Family History   Problem Relation Age of Onset    Cancer Mother     Heart disease Mother     Heart failure Mother     Liver disease Father         secondary to alcohol    Diabetes Maternal Grandmother     Allergies Son     Asthma Son     Urolithiasis Neg Hx     Prostate cancer Neg Hx     Kidney cancer Neg Hx     Eczema Neg Hx     Immunodeficiency Neg Hx     Angioedema Neg Hx     Allergic rhinitis Neg Hx     Atopy Neg Hx     Rhinitis Neg Hx     Urticaria Neg Hx        Social History     Socioeconomic History    Marital status:    Tobacco Use    Smoking status: Former Smoker     Quit date: 9/10/1982     Years since quittin.8    Smokeless tobacco: Never Used   Substance and Sexual Activity    Alcohol use: Yes     Comment: About 1/2 gallon of whiskey per week.  Patient drinks daily.    Drug use: No        Allergies:  No known drug allergies    Medications:    Current Outpatient Medications:     aspirin (ECOTRIN) 81 MG EC tablet, Take by mouth. 1 Tablet, Delayed Release (E.C.) Oral Every day, Disp: , Rfl:     atorvastatin (LIPITOR) 10 MG tablet, Take 1 tablet (10 mg total) by mouth once daily., Disp: 90 tablet, Rfl: 3    azelastine (ASTELIN) 137 mcg (0.1 %) nasal spray, SPRAY 1 SPRAY IN EACH NOSTRIL 2 TIMES A DAY, Disp: 90 mL, Rfl: 3    citalopram (CELEXA) 20 MG tablet, TAKE 1 TABLET BY MOUTH EVERY DAY, Disp: 90 tablet, Rfl: 1    fluticasone propionate (FLONASE) 50 mcg/actuation nasal spray, 1 SPRAY (50 MCG TOTAL) BY EACH NOSTRIL ROUTE ONCE DAILY., Disp: 48 mL, Rfl: PRN    meloxicam (MOBIC) 15 MG tablet, TAKE 1 TABLET BY MOUTH EVERY DAY (Patient not taking: Reported on 7/14/2022), Disp: 90 tablet, Rfl: 0    mometasone 0.1% (ELOCON) 0.1 % cream, Apply topically 2 (two) times daily as needed., Disp: 15 g, Rfl: 3    ondansetron (ZOFRAN-ODT) 8 MG TbDL, Take 1 tablet (8 mg total) by mouth 3 (three) times daily as needed (nausea). (Patient not taking: Reported on 7/14/2022), Disp: 20 tablet, Rfl: 0    tamsulosin (FLOMAX) 0.4 mg Cap, Take 1 capsule (0.4 mg total) by mouth once daily., Disp: 30 capsule, Rfl: 5    traZODone (DESYREL) 50 MG tablet, TAKE 1 TABLET (50 MG TOTAL) BY MOUTH NIGHTLY AS NEEDED FOR INSOMNIA., Disp: 90 tablet, Rfl: 0    PHYSICAL EXAMINATION:    Constitutional: He is oriented to person, place, and time. He appears well-developed and well-nourished.  He is in no apparent distress.    Neck: Normal ROM.     Cardiovascular: Normal rate.      Pulmonary/Chest: Effort normal. No respiratory distress.     Abdominal:  He exhibits no distension.  There is no CVA tenderness.     Neurological: He is alert and oriented to person, place, and time.     Skin: Skin is warm and dry.     Psych: Cooperative with normal affect.    Genitourinary: The prostate is 25 g.  Prostate is smooth, non tender with no  nodules noted.    Physical Exam      LABS:    U/a: sp grav 1.030, pH 5.5, negative  Lab Results   Component Value Date    PSA 0.46 12/07/2018    PSA 0.40 03/08/2017    PSA 0.43 11/04/2015    PSADIAG 0.83 01/19/2021    PSADIAG 0.85 01/07/2020     Lab Results   Component Value Date    CREATININE 1.1 07/07/2022         IMPRESSION:    Encounter Diagnoses   Name Primary?    Nephrolithiasis Yes    Screening for prostate cancer     BPH without obstruction/lower urinary tract symptoms          PLAN:  -PSA ordered and scheduled  -Will continue to monitor for LUTS associated with BPH. No voiding issues today.  -For kidney stones, refilled flomax. Discussed with patient I am unable to prescribe norco for pain unless ureteral stone seen on imaging. UA today was negative. Patient request to repeat CT and check for ureteral stone. CT RSS ordered and scheduled.  -The patient was encouraged to drink 2-3 liters of water a day, limit iced tea and butch as well as foods high in oxalate. They were cautioned to try to limit salt and red meat intake. Low oxalate diet (limit spinach, rhubarb, nuts, beets, potatoes, chocolate).  We also discussed adding citrate to the diet with the addition of jerald or lemon juice to their water or alternatively with crystal light.    -Get prompt medical attention if any of the following occur:  · Severe pain that returns and not relieved by pain medicines  · Repeated vomiting or unable to keep down fluids  · Weakness, dizziness or fainting  · Fever of 101.4ºF (38ºC) or higher, or as directed by your healthcare provider  · Blood clots in urine  · Foul smelling or cloudy urine  · Unable to pass urine for 8 hours or increasing bladder pressure    -Patient would like to follow up with Dr. Her. Follow up appt made.     I encouraged him or any of his family members to call or email me with questions and/or concerns.      30 minutes of total time spent on the encounter, which includes face to face time  and non-face to face time preparing to see the patient (eg, review of tests), Obtaining and/or reviewing separately obtained history, Documenting clinical information in the electronic or other health record, Independently interpreting results (not separately reported) and communicating results to the patient/family/caregiver, or Care coordination (not separately reported).

## 2022-07-14 NOTE — PATIENT INSTRUCTIONS
-The patient was encouraged to drink 2-3 liters of water a day, limit iced tea and butch as well as foods high in oxalate. They were cautioned to try to limit salt and red meat intake. Low oxalate diet (limit spinach, rhubarb, nuts, beets, potatoes, chocolate).  We also discussed adding citrate to the diet with the addition of jerald or lemon juice to their water or alternatively with crystal light.      Get prompt medical attention if any of the following occur:  Severe pain that returns and not relieved by pain medicines  Repeated vomiting or unable to keep down fluids  Weakness, dizziness or fainting  Fever of 101.4ºF (38ºC) or higher, or as directed by your healthcare provider  Blood clots in urine  Foul smelling or cloudy urine  Unable to pass urine for 8 hours or increasing bladder pressure

## 2022-07-15 ENCOUNTER — PATIENT MESSAGE (OUTPATIENT)
Dept: UROLOGY | Facility: CLINIC | Age: 67
End: 2022-07-15
Payer: MEDICARE

## 2022-07-21 ENCOUNTER — HOSPITAL ENCOUNTER (OUTPATIENT)
Dept: RADIOLOGY | Facility: HOSPITAL | Age: 67
Discharge: HOME | End: 2022-07-21
Attending: NURSE PRACTITIONER
Payer: MEDICARE

## 2022-07-21 DIAGNOSIS — N20.0 NEPHROLITHIASIS: ICD-10-CM

## 2022-07-21 PROCEDURE — 74176 CT ABD & PELVIS W/O CONTRAST: CPT | Mod: TC

## 2022-07-21 PROCEDURE — 74176 CT ABD & PELVIS W/O CONTRAST: CPT | Mod: 26,,, | Performed by: RADIOLOGY

## 2022-07-21 PROCEDURE — 74176 CT RENAL STONE STUDY ABD PELVIS WO: ICD-10-PCS | Mod: 26,,, | Performed by: RADIOLOGY

## 2022-07-29 ENCOUNTER — PATIENT MESSAGE (OUTPATIENT)
Dept: UROLOGY | Facility: CLINIC | Age: 67
End: 2022-07-29
Payer: MEDICARE

## 2022-08-01 DIAGNOSIS — N20.0 RENAL STONES: Primary | ICD-10-CM

## 2022-08-02 ENCOUNTER — PATIENT MESSAGE (OUTPATIENT)
Dept: UROLOGY | Facility: CLINIC | Age: 67
End: 2022-08-02
Payer: MEDICARE

## 2022-08-02 ENCOUNTER — LAB VISIT (OUTPATIENT)
Dept: LAB | Facility: HOSPITAL | Age: 67
End: 2022-08-02
Attending: NURSE PRACTITIONER
Payer: MEDICARE

## 2022-08-02 DIAGNOSIS — N20.0 RENAL STONES: ICD-10-CM

## 2022-08-02 PROCEDURE — 82365 CALCULUS SPECTROSCOPY: CPT | Performed by: NURSE PRACTITIONER

## 2022-08-08 ENCOUNTER — PATIENT MESSAGE (OUTPATIENT)
Dept: UROLOGY | Facility: CLINIC | Age: 67
End: 2022-08-08
Payer: MEDICARE

## 2022-08-08 LAB
COMPN STONE: NORMAL
SPECIMEN SOURCE: NORMAL
STONE ANALYSIS IR-IMP: NORMAL

## 2022-08-16 ENCOUNTER — LAB VISIT (OUTPATIENT)
Dept: LAB | Facility: HOSPITAL | Age: 67
End: 2022-08-16
Attending: UROLOGY
Payer: MEDICARE

## 2022-08-16 ENCOUNTER — OFFICE VISIT (OUTPATIENT)
Dept: UROLOGY | Facility: CLINIC | Age: 67
End: 2022-08-16
Payer: MEDICARE

## 2022-08-16 VITALS
HEIGHT: 67 IN | BODY MASS INDEX: 27.15 KG/M2 | SYSTOLIC BLOOD PRESSURE: 131 MMHG | HEART RATE: 75 BPM | DIASTOLIC BLOOD PRESSURE: 86 MMHG | WEIGHT: 173 LBS

## 2022-08-16 DIAGNOSIS — N20.0 BILATERAL RENAL STONES: ICD-10-CM

## 2022-08-16 DIAGNOSIS — R10.9 RIGHT FLANK PAIN: ICD-10-CM

## 2022-08-16 LAB
BILIRUB SERPL-MCNC: NORMAL MG/DL
BLOOD URINE, POC: NORMAL
CLARITY, POC UA: CLEAR
COLOR, POC UA: YELLOW
GLUCOSE UR QL STRIP: NORMAL
KETONES UR QL STRIP: NORMAL
LEUKOCYTE ESTERASE URINE, POC: NORMAL
NITRITE, POC UA: NORMAL
PH, POC UA: 5.5
PROTEIN, POC: NORMAL
PTH-INTACT SERPL-MCNC: 46.8 PG/ML (ref 9–77)
SPECIFIC GRAVITY, POC UA: 1.03
UROBILINOGEN, POC UA: 0.2

## 2022-08-16 PROCEDURE — 36415 COLL VENOUS BLD VENIPUNCTURE: CPT | Performed by: UROLOGY

## 2022-08-16 PROCEDURE — 99214 PR OFFICE/OUTPT VISIT, EST, LEVL IV, 30-39 MIN: ICD-10-PCS | Mod: S$PBB,,, | Performed by: UROLOGY

## 2022-08-16 PROCEDURE — 81002 URINALYSIS NONAUTO W/O SCOPE: CPT | Mod: PBBFAC,PN | Performed by: UROLOGY

## 2022-08-16 PROCEDURE — 99214 OFFICE O/P EST MOD 30 MIN: CPT | Mod: S$PBB,,, | Performed by: UROLOGY

## 2022-08-16 PROCEDURE — 83970 ASSAY OF PARATHORMONE: CPT | Performed by: UROLOGY

## 2022-08-16 PROCEDURE — 99214 OFFICE O/P EST MOD 30 MIN: CPT | Mod: PBBFAC,PN | Performed by: UROLOGY

## 2022-08-16 PROCEDURE — 99999 PR PBB SHADOW E&M-EST. PATIENT-LVL IV: ICD-10-PCS | Mod: PBBFAC,,, | Performed by: UROLOGY

## 2022-08-16 PROCEDURE — 99999 PR PBB SHADOW E&M-EST. PATIENT-LVL IV: CPT | Mod: PBBFAC,,, | Performed by: UROLOGY

## 2022-08-16 RX ORDER — KETOROLAC TROMETHAMINE 10 MG/1
10 TABLET, FILM COATED ORAL EVERY 6 HOURS
Qty: 20 TABLET | Refills: 0 | Status: SHIPPED | OUTPATIENT
Start: 2022-08-16 | End: 2022-08-21

## 2022-08-16 NOTE — PROGRESS NOTES
CHIEF COMPLAINT:    Mr. Oden is a 66 y.o. male presenting for kidney stones  PRESENTING ILLNESS:    Ang Oden Jr. is a 66 y.o. male with a PMH of BPH, kidney stones, HTN, Gout who presents for kidney stones. Last clinic visit was 1/19/21 with Dr. Hartmann.    Patient was seen in ED 7/7/22 for right flank pain. Patient reported that he passed a small stone that morning. Discharged home with norco, zofran, ketorolac, and flomax.  UA negative  CT RSS: The kidneys are of normal size contour and CT density for a noncontrast study.  On the right or 10 intrarenal calcifications consistent with stones.  The largest measures 6 mm.  Hydronephrosis or ureteral stone is not seen.  On the left are 12 intrarenal calcifications.  The largest measures 4 mm.  Hydronephrosis or ureteral stone is not seen on this side.  The bladder is of normal contour without mass or asymmetry    7/14/22 visit with Karey patient presents to clinic for f/u kidney stones. Patient reports he has passed a few stones since ED visit. Only symptom is flank pain. Denies dysuria, gross hematuria, fever, chills, nausea or vomiting. Denies difficulty voiding. He takes flomax when he is passing stone and needs refill. He also request refill for pain medication. His previous urologist would give him pain medication and flomax to keep on hand if needed for stones. He was not able to collect any stones that he recently passed.  Patient reports he does pass stones very often and has not ever needed surgery to remove stones.     Drinks: does not drink as much water as he should    3/14/19 Stone analysis resulted 100% calcium oxalate stone    He does have hx of Gout but no uric acid stone that patient is aware of. No recent Gout attacks since he stopped drinking alcohol.    Interval history 8/16/22:   He was c/o pain and so she sent him for ct. Ct essentially unchanged. No ureteral stones, no hydro. However continues to pass small fragments. Does ride a  motorcycle about 4x a week. Stones 8/2: 100% calcium oxalate. Ca on last bmp 7/7/22 9.2   He has had stones for 10 to 15 years or more. No family hx. Review of ct in 2020 shows fewer stones. Doesn't appear to have done a 24 hour urine in the charts but thinks he's done before, parathyroid hormone or has never been a medication for kidney stones. He's passed stones as big as a qtip. Meds: no topamax, vit D or calcium.    Only takes flomax as needed although ct shows stones in prostate and intravesical portion of prostate. Otherwise no problems with urinating.    ua today is negative      ctrss 7/21/22:        Urine history: family history of kidney, bladder or prostate cancer:No, personal or family history of kidney stones: Yes - personal, no family ,tobacco use: Yes - quit 30 yrs ago -smoked for 10 years, anticoagulation: No  8/16/22 Neg  7/14/22 Neg  7/7/22  Neg  7/14/20 Neg  1/7/20  Ng, void: tr bld  2/17/19 2+bld  6/28/16 2+bld  11/4/15 Neg  9/25/15 3+bld  11/16/11 Neg     PSA History: no fam hx of prostate cancer  Component PSA, Screen PSA Diagnostic   Latest Ref Rng & Units 0.00 - 4.00 ng/mL 0.00 - 4.00 ng/mL   7/21/2022 0.68    1/19/2021  0.83   1/7/2020  0.85   12/7/2018 0.46    3/8/2017 0.40    11/4/2015 0.43    1/30/2014 0.38    11/3/2012 0.56    1/7/2012 0.52    12/28/2010 0.36      REVIEW OF SYSTEMS:    Review of Systems    Constitutional: Negative for fever and chills.   HENT: Negative for hearing loss.   Respiratory: Negative for shortness of breath.   Cardiovascular: Negative for chest pain.   Gastrointestinal: Negative for nausea, vomiting, and constipation.   Genitourinary: See above  Neurological: Negative for dizziness.   Hematological: Does not bruise/bleed easily.   Psychiatric/Behavioral: Negative for confusion.       PHYSICAL EXAMINATION:  Vitals:    08/16/22 1424   BP: 131/86   Pulse: 75      exam as above     Recent Labs   Lab 08/18/21  0919 04/11/22  0855 07/07/22  1141   WBC 6.07 6.12  5.44   Hemoglobin 13.6 L 13.0 L 14.0   Hematocrit 40.0 39.2 L 39.2 L   Platelets 210 191 181   ]  Recent Labs   Lab 08/18/21  0919 04/11/22  0855 07/07/22  1141   Sodium 141 142 140   Potassium 4.2 4.7 3.8   Chloride 106 109 104   CO2 25 25 26   BUN 13 13 14   Creatinine 1.1 1.0 1.1   Glucose 98 106 89   Calcium 9.7 9.2 9.2   Alkaline Phosphatase 56 46 L 65   Total Protein 7.3 6.6 7.0   Albumin 4.1 3.7 4.0   Total Bilirubin 1.1 H 0.6 1.0   AST 20 23 22   ALT 17 21 20   ]    Lab Results   Component Value Date    HGBA1C 5.4 07/26/2016         IMPRESSION:    Encounter Diagnoses   Name Primary?    Right flank pain     Bilateral renal stones      Although he's passed multiple stones, still has multiple b renal stones (much more than 2020). No obvious workup previously to help determine risk factors for stone formation other than self-reported low fluid intake.    Goal is to decrease risk of making stones fast. Not rid him of stones.     Plan:  · 24 hour urine collection  · pth level (soon)   · flomax to keep on hand if passing stone  · toradol to keep on hand if passing stone. Don't take with meloxicam. Take food and A LOT of water.   · Keep out of ER unless having fever or severe pain with nausea  · Can always start with rbus and kub to avoid ctrss  · ctrss only if kub/rbus is unclear  · See if motorcycle correlates with increased stone passage   · At  Minimum xray and ultrasound yearly  · Next psa due in July 2023 (recent psa low)     rtc after 24 hour urine

## 2022-08-16 NOTE — PATIENT INSTRUCTIONS
Encounter Diagnoses   Name Primary?    Right flank pain     Bilateral renal stones      Although he's passed multiple stones, still has multiple b renal stones (much more than 2020). No obvious workup previously to help determine risk factors for stone formation other than self-reported low fluid intake.    Goal is to decrease risk of making stones fast. Not rid him of stones.     Plan:  24 hour urine collection  pth level (soon)   flomax to keep on hand if passing stone  toradol to keep on hand if passing stone. Don't take with meloxicam. Take food and A LOT of water.   Keep out of ER unless having fever or severe pain with nausea  Can always start with rbus and kub to avoid ctrss  ctrss only if kub/rbus is unclear  See if motorcycle correlates with increased stone passage   At  Minimum xray and ultrasound yearly  Next psa due in July 2023 (recent psa low)     rtc after 24 hour urine

## 2022-08-17 ENCOUNTER — PATIENT MESSAGE (OUTPATIENT)
Dept: INTERNAL MEDICINE | Facility: CLINIC | Age: 67
End: 2022-08-17
Payer: MEDICARE

## 2022-08-18 ENCOUNTER — OFFICE VISIT (OUTPATIENT)
Dept: ORTHOPEDICS | Facility: CLINIC | Age: 67
End: 2022-08-18
Payer: MEDICARE

## 2022-08-18 VITALS — HEIGHT: 67 IN | WEIGHT: 173 LBS | BODY MASS INDEX: 27.15 KG/M2

## 2022-08-18 DIAGNOSIS — M19.032 ARTHRITIS OF LEFT WRIST: Primary | ICD-10-CM

## 2022-08-18 DIAGNOSIS — M19.031 ARTHRITIS OF RIGHT WRIST: ICD-10-CM

## 2022-08-18 PROCEDURE — 20605 INTERMEDIATE JOINT ASPIRATION/INJECTION: L RADIOCARPAL: ICD-10-PCS | Mod: S$PBB,50,, | Performed by: ORTHOPAEDIC SURGERY

## 2022-08-18 PROCEDURE — 20605 DRAIN/INJ JOINT/BURSA W/O US: CPT | Mod: PBBFAC,PN | Performed by: ORTHOPAEDIC SURGERY

## 2022-08-18 PROCEDURE — 99999 PR PBB SHADOW E&M-EST. PATIENT-LVL III: CPT | Mod: PBBFAC,,, | Performed by: ORTHOPAEDIC SURGERY

## 2022-08-18 PROCEDURE — 99999 PR PBB SHADOW E&M-EST. PATIENT-LVL III: ICD-10-PCS | Mod: PBBFAC,,, | Performed by: ORTHOPAEDIC SURGERY

## 2022-08-18 PROCEDURE — 99213 PR OFFICE/OUTPT VISIT, EST, LEVL III, 20-29 MIN: ICD-10-PCS | Mod: S$PBB,25,, | Performed by: ORTHOPAEDIC SURGERY

## 2022-08-18 PROCEDURE — 99213 OFFICE O/P EST LOW 20 MIN: CPT | Mod: PBBFAC,PN,25 | Performed by: ORTHOPAEDIC SURGERY

## 2022-08-18 PROCEDURE — 99213 OFFICE O/P EST LOW 20 MIN: CPT | Mod: S$PBB,25,, | Performed by: ORTHOPAEDIC SURGERY

## 2022-08-18 RX ORDER — TRIAMCINOLONE ACETONIDE 40 MG/ML
40 INJECTION, SUSPENSION INTRA-ARTICULAR; INTRAMUSCULAR
Status: DISCONTINUED | OUTPATIENT
Start: 2022-08-18 | End: 2022-08-18 | Stop reason: HOSPADM

## 2022-08-18 RX ADMIN — TRIAMCINOLONE ACETONIDE 40 MG: 40 INJECTION, SUSPENSION INTRA-ARTICULAR; INTRAMUSCULAR at 09:08

## 2022-08-18 NOTE — PROGRESS NOTES
[facility]  History & Physical  Orthopedics    SUBJECTIVE:      COVID-19 attestation:  This patient was treated during the COVID-19 pandemic.  This was discussed with the patient, they are aware of our current policies and procedures, were given the option of delaying their visit and or switching to a virtual visit, delaying their surgery when applicable, and they elect to proceed.    Chief Complaint:  Bilateral wrist pain    Referring Provider: Self, Aaareferral     History of Present Illness:  Patient is a 66 y.o. left hand dominant male who presents today with complaints of bilateral wrist pain.  The patient notes this has been present for approximately 5 years.  He does remember trauma certainly to left wrist with a known fracture that was treated conservatively and also potentially the right wrist.  He denies any recent injuries.  He notes that the left wrist was for sure placed into a cast at some point.  These fractures were years ago.  His pain is moderate and is worsening slowly over time.  Denies numbness or tingling or other complaints and presents today for initial evaluation.     Interval history August 18, 2022:  The patient returns today for re-evaluation of his bilateral wrist pain.  He notes that his prior corticosteroid injections helped tremendously but his pain has now returned.  He would like to discuss additional options today and has no new complaints.    The patient is a/an retired.    Onset of symptoms/DOI was 5 years ago.    Symptoms are aggravated by activity and movement.    Symptoms are alleviated by rest.    Symptoms consist of pain and decreased ROM.    The patient rates their pain as a 5/10.    Attempted treatment(s) and/or interventions include activity modifications, rest, rest and activity modification.     The patient denies any fevers, chills, N/V, D/C and presents for evaluation.       Past Medical History:   Diagnosis Date    Anemia     Anticoagulant  long-term use     Chronic kidney disease     Chronic rhinitis     Disc     lower back    DJD (degenerative joint disease)     Foot pain     left foot    Gout, unspecified     HBP (high blood pressure)     High cholesterol     Kidney stone     Sleep apnea     Weight loss     15 pounds in two months     Past Surgical History:   Procedure Laterality Date    APPENDECTOMY      COLONOSCOPY N/A 11/11/2021    Procedure: COLONOSCOPY;  Surgeon: Christian Santiago MD;  Location: [facility];  Service: Endoscopy;  Laterality: N/A;    ELBOW SURGERY      VASECTOMY       Review of patient's allergies indicates:   Allergen Reactions    No known drug allergies      Social History     Social History Narrative    Not on file     Family History   Problem Relation Age of Onset    Cancer Mother     Heart disease Mother     Heart failure Mother     Liver disease Father         secondary to alcohol    Diabetes Maternal Grandmother     Allergies Son     Asthma Son     Urolithiasis Neg Hx     Prostate cancer Neg Hx     Kidney cancer Neg Hx     Eczema Neg Hx     Immunodeficiency Neg Hx     Angioedema Neg Hx     Allergic rhinitis Neg Hx     Atopy Neg Hx     Rhinitis Neg Hx     Urticaria Neg Hx          Current Outpatient Medications:     aspirin (ECOTRIN) 81 MG EC tablet, Take by mouth. 1 Tablet, Delayed Release (E.C.) Oral Every day, Disp: , Rfl:     atorvastatin (LIPITOR) 10 MG tablet, Take 1 tablet (10 mg total) by mouth once daily., Disp: 90 tablet, Rfl: 3    azelastine (ASTELIN) 137 mcg (0.1 %) nasal spray, SPRAY 1 SPRAY IN EACH NOSTRIL 2 TIMES A DAY, Disp: 90 mL, Rfl: 3    citalopram (CELEXA) 20 MG tablet, TAKE 1 TABLET BY MOUTH EVERY DAY, Disp: 90 tablet, Rfl: 1    fluticasone propionate (FLONASE) 50 mcg/actuation nasal spray, 1 SPRAY (50 MCG TOTAL) BY EACH NOSTRIL ROUTE ONCE DAILY., Disp: 48 mL, Rfl: PRN    ketorolac (TORADOL) 10 mg tablet, Take 1 tablet (10 mg total) by mouth every 6 (six) hours.  "for 5 days, Disp: 20 tablet, Rfl: 0    meloxicam (MOBIC) 15 MG tablet, TAKE 1 TABLET BY MOUTH EVERY DAY, Disp: 90 tablet, Rfl: 0    mometasone 0.1% (ELOCON) 0.1 % cream, Apply topically 2 (two) times daily as needed., Disp: 15 g, Rfl: 3    tamsulosin (FLOMAX) 0.4 mg Cap, Take 1 capsule (0.4 mg total) by mouth once daily., Disp: 30 capsule, Rfl: 5    traZODone (DESYREL) 50 MG tablet, TAKE 1 TABLET (50 MG TOTAL) BY MOUTH NIGHTLY AS NEEDED FOR INSOMNIA., Disp: 90 tablet, Rfl: 0      Review of Systems:  Constitutional: no fever or chills  Eyes: no visual changes  ENT: no nasal congestion or sore throat  Respiratory: no cough or shortness of breath  Cardiovascular: no chest pain  Gastrointestinal: no nausea or vomiting, tolerating diet  Musculoskeletal: pain, soreness and decreased ROM    OBJECTIVE:      Vital Signs (Most Recent):  Vitals:    08/18/22 0958   Weight: 78.5 kg (173 lb)   Height: 5' 7" (1.702 m)     Body mass index is 27.1 kg/m².      Physical Exam:  Constitutional: The patient appears well-developed and well-nourished. No distress.   Skin: No lesions appreciated  Head: Normocephalic and atraumatic.   Nose: Nose normal.   Ears: No deformities seen  Eyes: Conjunctivae and EOM are normal.   Neck: No tracheal deviation present.   Cardiovascular: Normal rate and intact distal pulses.    Pulmonary/Chest: Effort normal. No respiratory distress.   Abdominal: There is no guarding.   Neurological: The patient is alert.   Psychiatric: The patient has a normal mood and affect.     Bilateral Hand/Wrist Examination:    Observation/Inspection:  Swelling  none    Deformity  none  Discoloration  none     Scars   none    Atrophy  None  Global tenderness to palpation bilateral wrists    HAND/WRIST EXAMINATION:  Finkelstein's Test   Neg  WHAT Test    Neg  Snuff box tenderness   Neg  Cervantes's Test    Neg  Hook of Hamate Tenderness  Neg  CMC grind    positive on the right  Circumduction test   positive on the " right    Neurovascular Exam:  Digits WWP, brisk CR < 3s throughout  NVI motor/LTS to M/R/U nerves, radial pulse 2+  Tinel's Test - Carpal Tunnel  Neg  Tinel's Test - Cubital Tunnel  Neg  Phalen's Test    Neg  Median Nerve Compression Test Neg    ROM hand full, painless    ROM wrist significantly limited in the left greater than right wrists; extension is to approximately 20 in flexion is to 20 on the left, 30-30 in the right    ROM elbow full, painless    Abdomen not guarded  Respirations nonlabored  Perfusion intact    Diagnostic Results:     Imaging - I independently viewed the patient's imaging as well as the radiology report.  Xrays of the patient's bilateral wrist  demonstrate some posttraumatic radius deformity with scapholunate advanced collapse bilaterally  EMG - none    ASSESSMENT/PLAN:      66 y.o. 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  We discussed the working diagnosis as well as several other potential alternative diagnoses.  Treatment options were discussed, both conservative and surgical.  Conservative treatment options would include things such as activity modifications, workplace modifications, a period of rest, oral vs topical OTC and prescription anti-inflammatory medications, occupational therapy, splinting/bracing, immobilization, corticosteroid injections, and others.  Surgical options were discussed as well.     At this time, the patient would like to proceed with a repeat corticosteroid injection to bilateral radiocarpal joints.  These will be performed today.  Recommend anti-inflammatory medications as needed for pain.        Should the patient's symptoms worsen, persist, or fail to improve they should return for reevaluation and I would be happy to see them back anytime.        Allen Silva M.D.     Please be aware that this note  has been generated with the assistance of [employer] voice-to-text.  Please excuse any spelling or grammatical errors.    Thank you for choosing Dr. Allen Silva for your orthopedic hand and upper extremity care. It is our goal to provide you with exceptional care that will help keep you healthy, active, and get you back in the game.     If you felt that you received exemplary care today, please consider leaving feedback for Dr. Silva on [email or web address] at [email or web address].    Please do not hesitate to reach out to us via email, phone, or MyChart with any questions, concerns, or feedback.

## 2022-08-18 NOTE — PROCEDURES
Intermediate Joint Aspiration/Injection: R radiocarpal    Date/Time: 8/18/2022 9:30 AM  Performed by: Allen Silva MD  Authorized by: Allen Silva MD     Consent Done?: Yes (Verbal)  Indications: Pain  Site marked: The procedure site was marked    Timeout: Prior to procedure the correct patient, procedure, and site was verified      Location:  Wrist  Site:  R radiocarpal  Prep: Patient was prepped and draped in usual sterile fashion    Ultrasonic Guidance for needle placement: No  Needle size:  25 G  Approach:  Dorsal  Medications:  40 mg triamcinolone acetonide 40 mg/mL  Patient tolerance:  Patient tolerated the procedure well with no immediate complications

## 2022-08-18 NOTE — PROCEDURES
Intermediate Joint Aspiration/Injection: L radiocarpal    Date/Time: 8/18/2022 9:30 AM  Performed by: Allen Silva MD  Authorized by: Allen Silva MD     Consent Done?: Yes (Verbal)  Indications: Pain  Site marked: The procedure site was marked    Timeout: Prior to procedure the correct patient, procedure, and site was verified      Location:  Wrist  Site:  L radiocarpal  Prep: Patient was prepped and draped in usual sterile fashion    Ultrasonic Guidance for needle placement: No  Needle size:  25 G  Approach:  Dorsal  Medications:  40 mg triamcinolone acetonide 40 mg/mL  Patient tolerance:  Patient tolerated the procedure well with no immediate complications

## 2022-08-30 ENCOUNTER — LAB VISIT (OUTPATIENT)
Dept: LAB | Facility: HOSPITAL | Age: 67
End: 2022-08-30
Attending: FAMILY MEDICINE
Payer: MEDICARE

## 2022-08-30 DIAGNOSIS — M10.9 GOUT, ARTHRITIS: ICD-10-CM

## 2022-08-30 DIAGNOSIS — E78.2 MIXED HYPERLIPIDEMIA: ICD-10-CM

## 2022-08-30 LAB
ALBUMIN SERPL BCP-MCNC: 3.8 G/DL (ref 3.5–5.2)
ALP SERPL-CCNC: 53 U/L (ref 55–135)
ALT SERPL W/O P-5'-P-CCNC: 22 U/L (ref 10–44)
ANION GAP SERPL CALC-SCNC: 8 MMOL/L (ref 8–16)
AST SERPL-CCNC: 23 U/L (ref 10–40)
BASOPHILS # BLD AUTO: 0.03 K/UL (ref 0–0.2)
BASOPHILS NFR BLD: 0.4 % (ref 0–1.9)
BILIRUB SERPL-MCNC: 1 MG/DL (ref 0.1–1)
BUN SERPL-MCNC: 21 MG/DL (ref 8–23)
CALCIUM SERPL-MCNC: 9.4 MG/DL (ref 8.7–10.5)
CHLORIDE SERPL-SCNC: 110 MMOL/L (ref 95–110)
CHOLEST SERPL-MCNC: 164 MG/DL (ref 120–199)
CHOLEST/HDLC SERPL: 3 {RATIO} (ref 2–5)
CO2 SERPL-SCNC: 25 MMOL/L (ref 23–29)
CREAT SERPL-MCNC: 1 MG/DL (ref 0.5–1.4)
DIFFERENTIAL METHOD: ABNORMAL
EOSINOPHIL # BLD AUTO: 0.2 K/UL (ref 0–0.5)
EOSINOPHIL NFR BLD: 2.5 % (ref 0–8)
ERYTHROCYTE [DISTWIDTH] IN BLOOD BY AUTOMATED COUNT: 13.2 % (ref 11.5–14.5)
EST. GFR  (NO RACE VARIABLE): >60 ML/MIN/1.73 M^2
GLUCOSE SERPL-MCNC: 90 MG/DL (ref 70–110)
HCT VFR BLD AUTO: 38.2 % (ref 40–54)
HDLC SERPL-MCNC: 55 MG/DL (ref 40–75)
HDLC SERPL: 33.5 % (ref 20–50)
HGB BLD-MCNC: 13.2 G/DL (ref 14–18)
IMM GRANULOCYTES # BLD AUTO: 0.01 K/UL (ref 0–0.04)
IMM GRANULOCYTES NFR BLD AUTO: 0.1 % (ref 0–0.5)
LDLC SERPL CALC-MCNC: 94.6 MG/DL (ref 63–159)
LYMPHOCYTES # BLD AUTO: 2 K/UL (ref 1–4.8)
LYMPHOCYTES NFR BLD: 28.6 % (ref 18–48)
MCH RBC QN AUTO: 31.4 PG (ref 27–31)
MCHC RBC AUTO-ENTMCNC: 34.6 G/DL (ref 32–36)
MCV RBC AUTO: 91 FL (ref 82–98)
MONOCYTES # BLD AUTO: 0.7 K/UL (ref 0.3–1)
MONOCYTES NFR BLD: 10.2 % (ref 4–15)
NEUTROPHILS # BLD AUTO: 4.1 K/UL (ref 1.8–7.7)
NEUTROPHILS NFR BLD: 58.2 % (ref 38–73)
NONHDLC SERPL-MCNC: 109 MG/DL
NRBC BLD-RTO: 0 /100 WBC
PLATELET # BLD AUTO: 186 K/UL (ref 150–450)
PMV BLD AUTO: 9.6 FL (ref 9.2–12.9)
POTASSIUM SERPL-SCNC: 4 MMOL/L (ref 3.5–5.1)
PROT SERPL-MCNC: 6.8 G/DL (ref 6–8.4)
RBC # BLD AUTO: 4.2 M/UL (ref 4.6–6.2)
SODIUM SERPL-SCNC: 143 MMOL/L (ref 136–145)
TRIGL SERPL-MCNC: 72 MG/DL (ref 30–150)
URATE SERPL-MCNC: 8.1 MG/DL (ref 3.4–7)
WBC # BLD AUTO: 7.07 K/UL (ref 3.9–12.7)

## 2022-08-30 PROCEDURE — 84550 ASSAY OF BLOOD/URIC ACID: CPT | Performed by: FAMILY MEDICINE

## 2022-08-30 PROCEDURE — 36415 COLL VENOUS BLD VENIPUNCTURE: CPT | Performed by: FAMILY MEDICINE

## 2022-08-30 PROCEDURE — 80061 LIPID PANEL: CPT | Performed by: FAMILY MEDICINE

## 2022-08-30 PROCEDURE — 80053 COMPREHEN METABOLIC PANEL: CPT | Performed by: FAMILY MEDICINE

## 2022-08-30 PROCEDURE — 85025 COMPLETE CBC W/AUTO DIFF WBC: CPT | Performed by: FAMILY MEDICINE

## 2022-09-06 ENCOUNTER — OFFICE VISIT (OUTPATIENT)
Dept: FAMILY MEDICINE | Facility: CLINIC | Age: 67
End: 2022-09-06
Payer: MEDICARE

## 2022-09-06 VITALS
OXYGEN SATURATION: 98 % | BODY MASS INDEX: 27.86 KG/M2 | WEIGHT: 177.5 LBS | DIASTOLIC BLOOD PRESSURE: 60 MMHG | HEART RATE: 68 BPM | TEMPERATURE: 99 F | HEIGHT: 67 IN | RESPIRATION RATE: 16 BRPM | SYSTOLIC BLOOD PRESSURE: 112 MMHG

## 2022-09-06 DIAGNOSIS — N40.0 BENIGN PROSTATIC HYPERPLASIA, UNSPECIFIED WHETHER LOWER URINARY TRACT SYMPTOMS PRESENT: Chronic | ICD-10-CM

## 2022-09-06 DIAGNOSIS — G47.00 INSOMNIA, UNSPECIFIED TYPE: ICD-10-CM

## 2022-09-06 DIAGNOSIS — Z23 NEED FOR PNEUMOCOCCAL VACCINATION: ICD-10-CM

## 2022-09-06 DIAGNOSIS — Z12.5 PROSTATE CANCER SCREENING: ICD-10-CM

## 2022-09-06 DIAGNOSIS — M19.031 ARTHRITIS OF RIGHT WRIST: ICD-10-CM

## 2022-09-06 DIAGNOSIS — F41.9 ANXIETY: ICD-10-CM

## 2022-09-06 DIAGNOSIS — M25.532 ARTHRALGIA OF LEFT WRIST: ICD-10-CM

## 2022-09-06 DIAGNOSIS — I10 ESSENTIAL HYPERTENSION: ICD-10-CM

## 2022-09-06 DIAGNOSIS — M10.9 GOUT, ARTHRITIS: ICD-10-CM

## 2022-09-06 DIAGNOSIS — E78.2 MIXED HYPERLIPIDEMIA: Primary | ICD-10-CM

## 2022-09-06 PROCEDURE — 99214 PR OFFICE/OUTPT VISIT, EST, LEVL IV, 30-39 MIN: ICD-10-PCS | Mod: S$PBB,,, | Performed by: FAMILY MEDICINE

## 2022-09-06 PROCEDURE — 99213 OFFICE O/P EST LOW 20 MIN: CPT | Mod: PBBFAC,PO | Performed by: FAMILY MEDICINE

## 2022-09-06 PROCEDURE — 99999 PR PBB SHADOW E&M-EST. PATIENT-LVL III: ICD-10-PCS | Mod: PBBFAC,,, | Performed by: FAMILY MEDICINE

## 2022-09-06 PROCEDURE — 99214 OFFICE O/P EST MOD 30 MIN: CPT | Mod: S$PBB,,, | Performed by: FAMILY MEDICINE

## 2022-09-06 PROCEDURE — 99999 PR PBB SHADOW E&M-EST. PATIENT-LVL III: CPT | Mod: PBBFAC,,, | Performed by: FAMILY MEDICINE

## 2022-09-06 PROCEDURE — 90677 PCV20 VACCINE IM: CPT | Mod: PBBFAC,PO

## 2022-09-06 RX ORDER — TRAZODONE HYDROCHLORIDE 50 MG/1
50 TABLET ORAL NIGHTLY
Qty: 90 TABLET | Refills: 3 | Status: SHIPPED | OUTPATIENT
Start: 2022-09-06 | End: 2022-12-31

## 2022-09-06 RX ORDER — TRAZODONE HYDROCHLORIDE 50 MG/1
50 TABLET ORAL NIGHTLY
COMMUNITY
End: 2022-09-06 | Stop reason: SDUPTHER

## 2022-09-06 RX ORDER — MELOXICAM 15 MG/1
15 TABLET ORAL DAILY PRN
Qty: 90 TABLET | Status: SHIPPED | OUTPATIENT
Start: 2022-09-06 | End: 2022-12-31

## 2022-09-06 NOTE — PROGRESS NOTES
Subjective:       Patient ID: Ang Oden Jr. is a 66 y.o. male.    Chief Complaint: Annual Exam    HPI  Review of Systems   Constitutional:  Negative for fatigue and unexpected weight change.   Respiratory:  Negative for chest tightness and shortness of breath.    Cardiovascular:  Negative for chest pain, palpitations and leg swelling.   Gastrointestinal:  Negative for abdominal pain.   Musculoskeletal:  Positive for arthralgias and back pain.   Neurological:  Negative for dizziness, syncope, light-headedness and headaches.     Patient Active Problem List   Diagnosis    Olecranon bursitis    Gout, arthritis    Hyperlipidemia    Anemia, Hgb 13.1    Lumbago due to displacement of intervertebral disc    Male hypogonadism    Alcohol abuse with intoxication, daily-quit 2016    Alcohol induced fatty liver    KYLEIGH (obstructive sleep apnea), not using CPAP    Kidney calculi    BPH (benign prostatic hypertrophy)    Diverticulosis    Cardiovascular risk factor, FCVRS 5% on Zocor    Insomnia    Anxiety    Allergic rhinitis    Essential hypertension    History of alcohol abuse    Dysphagia    Pre-hypertension     Patient is here for a chronic conditions follow up.    Reviewed labs 8/2022    Ortho Dr. Silva treating left wrist arthritis. Steroid injections help some. Takes meloxicam prn. C/o lebron wrist pain left > right. 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        Card Dr. EVELYN sheriff treating KYLEIGH not using CPAP. HPL controlled     Uric acid 8.1. Last gout 2 years and off allopurinol 1 year     urology Dr. Hartmann/now [facility] -bph , kidney stone.   PSA .83 1/2021. Uses flomax only when has kidney stones.  No LUTS from BPH. Has been passing more stones recently.      Chronic intermittent LBP     Stress and anxious. Insomnia.  Worry too much , sad mood. Did well on celexa.-no longer taking      Colonoscopy Dr. Santiago 2021 1 hyperplastic polyp. On  5 year surveillance  Objective:      Physical Exam  Vitals and nursing note reviewed.   Constitutional:       Appearance: He is well-developed.   Cardiovascular:      Rate and Rhythm: Normal rate and regular rhythm.      Heart sounds: Normal heart sounds.   Pulmonary:      Effort: Pulmonary effort is normal.      Breath sounds: Normal breath sounds.   Musculoskeletal:      Right wrist: Tenderness present. No swelling or effusion. Normal range of motion.   Skin:     General: Skin is warm and dry.   Neurological:      Mental Status: He is alert and oriented to person, place, and time.       Assessment:       1. Mixed hyperlipidemia    2. Essential hypertension    3. Benign prostatic hyperplasia, unspecified whether lower urinary tract symptoms present    4. Gout, arthritis    5. Insomnia, unspecified type    6. Anxiety    7. Need for pneumococcal vaccination    8. Prostate cancer screening    9. Arthralgia of left wrist    10. Arthritis of right wrist        Plan:       1. Mixed hyperlipidemia  Controlled on current medications.  Continue current medications.    - CBC Auto Differential; Future  - Comprehensive Metabolic Panel; Future  - Lipid Panel; Future    2. Essential hypertension  Controlled on current medications.  Continue current medications.      3. Benign prostatic hyperplasia, unspecified whether lower urinary tract symptoms present  Cont urology monitoring and PSA yearly. Cont flomax prn    4. Gout, arthritis  Avoid high purine foods. Treat prn  - Uric Acid; Future    5. Insomnia, unspecified type  Controlled on current medications.  Continue current medications.    - traZODone (DESYREL) 50 MG tablet; Take 1 tablet (50 mg total) by mouth every evening.  Dispense: 90 tablet; Refill: 3    6. Anxiety  Cont current mgmt    7. Need for pneumococcal vaccination  Immunize today.  Counseled patient on risks, benefits and side effects.  Patient elected to proceed with vaccination.    - (In Office Administered)  Pneumococcal Conjugate Vaccine (20 Valent) (IM)    8. Prostate cancer screening  Discussed benefits, risks and limitations of PSA testing and current USPSTF guidelines.  Patient expressed verbal understanding and wished to pursue screening.    - PSA, Screening; Future    9. Arthralgia of left wrist  Treat prn  - meloxicam (MOBIC) 15 MG tablet; Take 1 tablet (15 mg total) by mouth daily as needed for Pain.  Dispense: 90 tablet; Refill: PRN    10. Arthritis of right wrist  Treat prn  - meloxicam (MOBIC) 15 MG tablet; Take 1 tablet (15 mg total) by mouth daily as needed for Pain.  Dispense: 90 tablet; Refill: PRN      Time spent with patient: 20 minutes    Patient with be reevaluated in 1 year or sooner prn    Greater than 50% of this visit was spent counseling as described in above documentation:Yes

## 2022-10-18 ENCOUNTER — HOSPITAL ENCOUNTER (OUTPATIENT)
Dept: RADIOLOGY | Facility: HOSPITAL | Age: 67
Discharge: HOME | End: 2022-10-18
Attending: UROLOGY
Payer: MEDICARE

## 2022-10-18 ENCOUNTER — OFFICE VISIT (OUTPATIENT)
Dept: UROLOGY | Facility: CLINIC | Age: 67
End: 2022-10-18
Payer: MEDICARE

## 2022-10-18 VITALS
DIASTOLIC BLOOD PRESSURE: 73 MMHG | BODY MASS INDEX: 27.94 KG/M2 | HEART RATE: 63 BPM | HEIGHT: 67 IN | WEIGHT: 178 LBS | SYSTOLIC BLOOD PRESSURE: 118 MMHG

## 2022-10-18 DIAGNOSIS — N20.0 NEPHROLITHIASIS: ICD-10-CM

## 2022-10-18 DIAGNOSIS — R82.991 HYPOCITRATURIA: Primary | ICD-10-CM

## 2022-10-18 LAB
BILIRUBIN, UA POC OHS: NEGATIVE
BLOOD, UA POC OHS: NEGATIVE
CLARITY, UA POC OHS: CLEAR
COLOR, UA POC OHS: YELLOW
GLUCOSE, UA POC OHS: NEGATIVE
KETONES, UA POC OHS: NEGATIVE
LEUKOCYTES, UA POC OHS: NEGATIVE
NITRITE, UA POC OHS: NEGATIVE
PH, UA POC OHS: 6.5
PROTEIN, UA POC OHS: NEGATIVE
SPECIFIC GRAVITY, UA POC OHS: 1.02
UROBILINOGEN, UA POC OHS: 0.2

## 2022-10-18 PROCEDURE — 99214 OFFICE O/P EST MOD 30 MIN: CPT | Mod: PBBFAC,PN,25 | Performed by: UROLOGY

## 2022-10-18 PROCEDURE — 99214 PR OFFICE/OUTPT VISIT, EST, LEVL IV, 30-39 MIN: ICD-10-PCS | Mod: S$PBB,,, | Performed by: UROLOGY

## 2022-10-18 PROCEDURE — 99999 PR PBB SHADOW E&M-EST. PATIENT-LVL IV: CPT | Mod: PBBFAC,,, | Performed by: UROLOGY

## 2022-10-18 PROCEDURE — 99999 PR PBB SHADOW E&M-EST. PATIENT-LVL IV: ICD-10-PCS | Mod: PBBFAC,,, | Performed by: UROLOGY

## 2022-10-18 PROCEDURE — 81003 URINALYSIS AUTO W/O SCOPE: CPT | Mod: PBBFAC,PN | Performed by: UROLOGY

## 2022-10-18 PROCEDURE — 74176 CT RENAL STONE STUDY ABD PELVIS WO: ICD-10-PCS | Mod: 26,,, | Performed by: RADIOLOGY

## 2022-10-18 PROCEDURE — 74176 CT ABD & PELVIS W/O CONTRAST: CPT | Mod: TC

## 2022-10-18 PROCEDURE — 99214 OFFICE O/P EST MOD 30 MIN: CPT | Mod: S$PBB,,, | Performed by: UROLOGY

## 2022-10-18 PROCEDURE — 74176 CT ABD & PELVIS W/O CONTRAST: CPT | Mod: 26,,, | Performed by: RADIOLOGY

## 2022-10-18 NOTE — PROGRESS NOTES
Ctrss shows no obstructing stones. The right flank pain he is expierencing right now is not from a stone

## 2022-10-18 NOTE — PROGRESS NOTES
CHIEF COMPLAINT:    Mr. Oden is a 66 y.o. male presenting for kidney stones  PRESENTING ILLNESS:    Ang Oden Jr. is a 66 y.o. male with a PMH of BPH, kidney stones, HTN, Gout who presents for kidney stones. Last clinic visit was 1/19/21 with Dr. Hartmann.  Ctrss 2/17/19- multiple B renal stones  Ctrss 1/7/20- had multiple R renal stones. A few left renal stones.   Ctrss 11/20/20 -only 1 right renal stone. A few left renal stones.   Patient was seen in ED 7/7/22 for right flank pain. Patient reported that he passed a small stone that morning. Discharged home with norco, zofran, ketorolac, and flomax. UA negative  CT RSS 7/7/22: The kidneys are of normal size contour and CT density for a noncontrast study.  On the right or 10 intrarenal calcifications consistent with stones.  The largest measures 6 mm.  Hydronephrosis or ureteral stone is not seen.  On the left are 12 intrarenal calcifications.  The largest measures 4 mm.  Hydronephrosis or ureteral stone is not seen on this side.  The bladder is of normal contour without mass or asymmetry  7/14/22 visit with Karey patient presents to clinic for f/u kidney stones. Patient reports he has passed a few stones since ED visit. Only symptom is flank pain. Denies dysuria, gross hematuria, fever, chills, nausea or vomiting. Denies difficulty voiding. He takes flomax when he is passing stone and needs refill. He also request refill for pain medication. His previous urologist would give him pain medication and flomax to keep on hand if needed for stones. He was not able to collect any stones that he recently passed.  Patient reports he does pass stones very often and has not ever needed surgery to remove stones.   Drinks: does not drink as much water as he should  3/14/19 Stone analysis resulted 100% calcium oxalate stone  He does have hx of Gout but no uric acid stone that patient is aware of. No recent Gout attacks since he stopped drinking alcohol.    Interval  history 8/16/22:  He was c/o pain and so she sent him for ct. Ct essentially unchanged. No ureteral stones, no hydro. However continues to pass small fragments. Does ride a motorcycle about 4x a week. Stones 8/2: 100% calcium oxalate. Ca on last bmp 7/7/22 9.2  He has had stones for 10 to 15 years or more. No family hx. Review of ct in 2020 shows fewer stones. Doesn't appear to have done a 24 hour urine in the charts but thinks he's done before, parathyroid hormone or has never been a medication for kidney stones. He's passed stones as big as a qtip. Meds: no topamax, vit D or calcium.   Only takes flomax as needed although ct shows stones in prostate and intravesical portion of prostate. Otherwise no problems with urinating.   ua today is negative    ctrss 7/21/22:          Interval history 10/18/22:  Here to discuss 24 hour urine done 8/25/22. Shows low UOP: 1.03L and low citrate 284. Pth 46.8. his citrate in 2019 was normal at 689 and his ct then in 2019 was the same as ct now but ct in 2020 actually showed less stones in R kidney. He had low uop then as well. Says he doesn't like drinking too much fluids, causes him to bloat.           He said he's passed stones about 3x a week since I last saw him. He says he only likes to take flomax when he needs to- if he's passing a stone, but he's been taking every day for the last 2 months bc he's been passing stones.  He says he notices a stronger flow but wants to stop the medication if he can bc he doesn't like to take meds.   Largest stone he has passed is  about 6mm  He said he's still having R flank pain that is constant but worse when he's about to pass another stone. The right flank pain has never gone away since his ct in august. No images since august 2022.  Gave him toradol last visit. He says didn't help with pain.     Urine history: family history of kidney, bladder or prostate cancer:No, personal or family history of kidney stones: Yes - personal, no family  ,tobacco use: Yes - quit 30 yrs ago -smoked for 10 years, anticoagulation: No  10/18/22 Neg   8/16/22 Neg  7/14/22 Neg  7/7/22  Neg  7/14/20 Neg  1/7/20  Ng, void: tr bld  2/17/19 2+bld  6/28/16 2+bld  11/4/15 Neg  9/25/15 3+bld  11/16/11 Neg     PSA History: no fam hx of prostate cancer  Component PSA, Screen PSA Diagnostic   Latest Ref Rng & Units 0.00 - 4.00 ng/mL 0.00 - 4.00 ng/mL   7/21/2022 0.68    1/19/2021  0.83   1/7/2020  0.85   12/7/2018 0.46    3/8/2017 0.40    11/4/2015 0.43    1/30/2014 0.38    11/3/2012 0.56    1/7/2012 0.52    12/28/2010 0.36      REVIEW OF SYSTEMS:  As above    PHYSICAL EXAMINATION:  Vitals:    10/18/22 1443   BP: 118/73   Pulse: 63      exam as above     Recent Labs   Lab 04/11/22  0855 07/07/22  1141 08/30/22  0739   WBC 6.12 5.44 7.07   Hemoglobin 13.0 L 14.0 13.2 L   Hematocrit 39.2 L 39.2 L 38.2 L   Platelets 191 181 186     ]  Recent Labs   Lab 04/11/22  0855 07/07/22  1141 08/30/22  0739   Sodium 142 140 143   Potassium 4.7 3.8 4.0   Chloride 109 104 110   CO2 25 26 25   BUN 13 14 21   Creatinine 1.0 1.1 1.0   Glucose 106 89 90   Calcium 9.2 9.2 9.4   Alkaline Phosphatase 46 L 65 53 L   Total Protein 6.6 7.0 6.8   Albumin 3.7 4.0 3.8   Total Bilirubin 0.6 1.0 1.0   AST 23 22 23   ALT 21 20 22     ]      IMPRESSION:    Encounter Diagnoses   Name Primary?    Hypocitraturia Yes    Nephrolithiasis        Plan:  Asking for pain meds. Still has R flank pain that has not really improved. Will start with a rbus and kub. No images in 4 months. If he has hydro can send in tramadol or norco. If not, then hold off.   Increase citrate - lemon juice,  lemon concentrate (citrate was 267)  Increase fluid intake   Has flomax to keep on hand if passing stone  Has toradol to keep on hand if passing stone. Don't take with meloxicam.  Next psa in July 2023  If not passing stones rbus and kub in 6 months and f/u after

## 2022-11-14 DIAGNOSIS — M10.069: ICD-10-CM

## 2022-11-14 RX ORDER — COLCHICINE 0.6 MG/1
0.6 TABLET ORAL 2 TIMES DAILY
Qty: 30 TABLET | Refills: 2 | Status: SHIPPED | OUTPATIENT
Start: 2022-11-14 | End: 2022-11-15

## 2022-11-14 NOTE — TELEPHONE ENCOUNTER
No new care gaps identified.  [facility] Embedded Care Gaps. Reference number: [id number]. 11/14/2022   4:42:09 PM CST

## 2022-11-14 NOTE — TELEPHONE ENCOUNTER
----- Message from Ruth Alanis sent at 11/14/2022 11:25 AM CST -----  .Type:  Patient Call Back    Who Called: PT       Does the patient know what this is regarding?: PT CALLED TO GET GOUT MEDICATION SENT TO HIS PHARMACY PT DIDN'T HAVE THE NAME OF THE MEDICATION     Would the patient rather a call back YES     Best Call Back Number:  [phone]    Additional Information: Thank You

## 2022-12-31 ENCOUNTER — OFFICE VISIT (OUTPATIENT)
Dept: URGENT CARE | Facility: CLINIC | Age: 67
End: 2022-12-31
Payer: MEDICARE

## 2022-12-31 VITALS
WEIGHT: 182 LBS | HEIGHT: 68 IN | OXYGEN SATURATION: 98 % | BODY MASS INDEX: 27.58 KG/M2 | SYSTOLIC BLOOD PRESSURE: 112 MMHG | TEMPERATURE: 98 F | DIASTOLIC BLOOD PRESSURE: 73 MMHG | RESPIRATION RATE: 12 BRPM | HEART RATE: 67 BPM

## 2022-12-31 DIAGNOSIS — B96.89 BACTERIAL SINUSITIS: ICD-10-CM

## 2022-12-31 DIAGNOSIS — R05.9 COUGH, UNSPECIFIED TYPE: Primary | ICD-10-CM

## 2022-12-31 DIAGNOSIS — J32.9 BACTERIAL SINUSITIS: ICD-10-CM

## 2022-12-31 LAB
CTP QC/QA: YES
CTP QC/QA: YES
FLUAV AG NPH QL: NEGATIVE
FLUBV AG NPH QL: NEGATIVE
SARS-COV-2 AG RESP QL IA.RAPID: NEGATIVE

## 2022-12-31 PROCEDURE — 96372 PR INJECTION,THERAP/PROPH/DIAG2ST, IM OR SUBCUT: ICD-10-PCS | Mod: S$GLB,,,

## 2022-12-31 PROCEDURE — 99214 OFFICE O/P EST MOD 30 MIN: CPT | Mod: 25,S$GLB,,

## 2022-12-31 PROCEDURE — 87804 POCT INFLUENZA A/B: ICD-10-PCS | Mod: QW,,,

## 2022-12-31 PROCEDURE — 87804 INFLUENZA ASSAY W/OPTIC: CPT | Mod: QW,,,

## 2022-12-31 PROCEDURE — 87811 SARS CORONAVIRUS 2 ANTIGEN POCT, MANUAL READ: ICD-10-PCS | Mod: QW,CR,S$GLB,

## 2022-12-31 PROCEDURE — 99214 PR OFFICE/OUTPT VISIT, EST, LEVL IV, 30-39 MIN: ICD-10-PCS | Mod: 25,S$GLB,,

## 2022-12-31 PROCEDURE — 87811 SARS-COV-2 COVID19 W/OPTIC: CPT | Mod: QW,CR,S$GLB,

## 2022-12-31 PROCEDURE — 96372 THER/PROPH/DIAG INJ SC/IM: CPT | Mod: S$GLB,,,

## 2022-12-31 RX ORDER — BENZONATATE 200 MG/1
200 CAPSULE ORAL 3 TIMES DAILY PRN
Qty: 15 CAPSULE | Refills: 0 | Status: SHIPPED | OUTPATIENT
Start: 2022-12-31 | End: 2023-01-05

## 2022-12-31 RX ORDER — AMOXICILLIN AND CLAVULANATE POTASSIUM 875; 125 MG/1; MG/1
1 TABLET, FILM COATED ORAL EVERY 12 HOURS
Qty: 14 TABLET | Refills: 0 | Status: SHIPPED | OUTPATIENT
Start: 2022-12-31 | End: 2023-01-07

## 2022-12-31 RX ORDER — DEXAMETHASONE SODIUM PHOSPHATE 4 MG/ML
4 INJECTION, SOLUTION INTRA-ARTICULAR; INTRALESIONAL; INTRAMUSCULAR; INTRAVENOUS; SOFT TISSUE
Status: COMPLETED | OUTPATIENT
Start: 2022-12-31 | End: 2022-12-31

## 2022-12-31 RX ADMIN — DEXAMETHASONE SODIUM PHOSPHATE 4 MG: 4 INJECTION, SOLUTION INTRA-ARTICULAR; INTRALESIONAL; INTRAMUSCULAR; INTRAVENOUS; SOFT TISSUE at 09:12

## 2022-12-31 NOTE — PATIENT INSTRUCTIONS
For the next 2-3 days do a trail of Claritin  or zrytec .   Rotate tylenol and ibuprofen as needed if no contraindications for fever and pain.  Use Flonase and Astelin for nasal congestion.   Follow up with ENT in 1-2 weeks if symptoms do not resolve.   Mucinex D for 2-3 days. Monitor Blood pressure and discontinue if this is elevated. Coricidin if you have high blood pressure, do not take Mucinex D.   Tessalon as needed for cough.   Cepacol spray and warm salt gargles for sore throat.  ER for difficulty breathing not relieved by rest, excessive lethargy and/or change in mental status, oxygen level less than 93% or worsening of symptoms.       [facility]  Phone: [phone]  Email: [email or web address]  Address: [address] [place] [address]

## 2022-12-31 NOTE — PROGRESS NOTES
"Subjective:       Patient ID: Ang Oden Jr. is a 67 y.o. male.    Vitals:  height is 5' 8" (1.727 m) and weight is 82.6 kg (182 lb). His temperature is 98.2 °F (36.8 °C). His blood pressure is 112/73 and his pulse is 67. His respiration is 12 and oxygen saturation is 98%.     Chief Complaint: Sinus Problem    Pt thinks he has a sinus infection. States this happens to him year round. He is going on a cruise in a few weeks and would like to make sure hes better by then.   C/o Sore throat, ha, sinus pressure, ear pain and congestion, coughing up mucus. ( Yellow green mucus). Started on Monday. Pt would like a steroid shot ,.    Sinus Problem  This is a new problem. The current episode started in the past 7 days (5 days). The problem has been gradually worsening since onset. There has been no fever. Associated symptoms include congestion, coughing, ear pain, headaches, sinus pressure and a sore throat. Pertinent negatives include no chills, diaphoresis or shortness of breath. Treatments tried: alkaseltzer.     Constitution: Negative for activity change, appetite change, chills, sweating, fever and unexpected weight change.   HENT:  Positive for ear pain, congestion, sinus pressure and sore throat. Negative for postnasal drip and sinus pain.    Cardiovascular:  Negative for chest pain.   Eyes:  Negative for blurred vision.   Respiratory:  Positive for cough. Negative for chest tightness and shortness of breath.    Gastrointestinal:  Negative for abdominal pain.   Neurological:  Positive for headaches. Negative for dizziness, history of vertigo and altered mental status.   Psychiatric/Behavioral:  Negative for altered mental status.      Objective:      Physical Exam   Constitutional: He is oriented to person, place, and time. He appears well-developed.  Non-toxic appearance. He does not appear ill. No distress.   HENT:   Head: Normocephalic.   Nose: Rhinorrhea and congestion present. Right sinus exhibits maxillary " sinus tenderness and frontal sinus tenderness. Left sinus exhibits maxillary sinus tenderness and frontal sinus tenderness.   Mouth/Throat: Mucous membranes are moist. No oropharyngeal exudate or posterior oropharyngeal erythema.   Eyes: Conjunctivae and EOM are normal.   Cardiovascular: Normal rate and normal heart sounds.   Pulmonary/Chest: Effort normal and breath sounds normal. No respiratory distress. He has no wheezes. He has no rhonchi.   Neurological: no focal deficit. He is alert and oriented to person, place, and time.   Skin: Skin is warm, dry and not diaphoretic. Capillary refill takes 2 to 3 seconds.   Psychiatric: His behavior is normal. Mood normal.       Assessment:       1. Cough, unspecified type    2. Bacterial sinusitis          Plan:         Cough, unspecified type  -     SARS Coronavirus 2 Antigen, POCT Manual Read  -     POCT Influenza A/B  -     benzonatate (TESSALON) 200 MG capsule; Take 1 capsule (200 mg total) by mouth 3 (three) times daily as needed for Cough.  Dispense: 15 capsule; Refill: 0    Bacterial sinusitis  -     amoxicillin-clavulanate 875-125mg (AUGMENTIN) 875-125 mg per tablet; Take 1 tablet by mouth every 12 (twelve) hours. for 7 days  Dispense: 14 tablet; Refill: 0  -     dexAMETHasone injection 4 mg         Pt presents with cough and congestion x 1 week, reports history of sinus infections, states symptoms are consistent with previous episodes, covid/flu negative, reports yellow nasal discharge, is having cough, lungs clear, denies sob or cp, will continue supportive treatment, pt requesting steroid shot, denies steroids in past 3 months, No SE with steroids in past, not a DM, will cover for bacterial sinusitis.

## 2023-09-01 ENCOUNTER — LAB VISIT (OUTPATIENT)
Dept: LAB | Facility: HOSPITAL | Age: 68
End: 2023-09-01
Attending: FAMILY MEDICINE
Payer: MEDICARE

## 2023-09-01 DIAGNOSIS — E78.2 MIXED HYPERLIPIDEMIA: ICD-10-CM

## 2023-09-01 DIAGNOSIS — Z12.5 PROSTATE CANCER SCREENING: ICD-10-CM

## 2023-09-01 DIAGNOSIS — M10.9 GOUT, ARTHRITIS: ICD-10-CM

## 2023-09-01 LAB
ALBUMIN SERPL BCP-MCNC: 3.9 G/DL (ref 3.5–5.2)
ALP SERPL-CCNC: 57 U/L (ref 55–135)
ALT SERPL W/O P-5'-P-CCNC: 19 U/L (ref 10–44)
ANION GAP SERPL CALC-SCNC: 12 MMOL/L (ref 8–16)
AST SERPL-CCNC: 25 U/L (ref 10–40)
BASOPHILS # BLD AUTO: 0.04 K/UL (ref 0–0.2)
BASOPHILS NFR BLD: 0.5 % (ref 0–1.9)
BILIRUB SERPL-MCNC: 1 MG/DL (ref 0.1–1)
BUN SERPL-MCNC: 13 MG/DL (ref 8–23)
CALCIUM SERPL-MCNC: 9.3 MG/DL (ref 8.7–10.5)
CHLORIDE SERPL-SCNC: 110 MMOL/L (ref 95–110)
CHOLEST SERPL-MCNC: 171 MG/DL (ref 120–199)
CHOLEST/HDLC SERPL: 3.5 {RATIO} (ref 2–5)
CO2 SERPL-SCNC: 23 MMOL/L (ref 23–29)
COMPLEXED PSA SERPL-MCNC: 0.68 NG/ML (ref 0–4)
CREAT SERPL-MCNC: 1.1 MG/DL (ref 0.5–1.4)
DIFFERENTIAL METHOD: ABNORMAL
EOSINOPHIL # BLD AUTO: 0.3 K/UL (ref 0–0.5)
EOSINOPHIL NFR BLD: 3.7 % (ref 0–8)
ERYTHROCYTE [DISTWIDTH] IN BLOOD BY AUTOMATED COUNT: 13.3 % (ref 11.5–14.5)
EST. GFR  (NO RACE VARIABLE): >60 ML/MIN/1.73 M^2
GLUCOSE SERPL-MCNC: 90 MG/DL (ref 70–110)
HCT VFR BLD AUTO: 38 % (ref 40–54)
HDLC SERPL-MCNC: 49 MG/DL (ref 40–75)
HDLC SERPL: 28.7 % (ref 20–50)
HGB BLD-MCNC: 12.9 G/DL (ref 14–18)
IMM GRANULOCYTES # BLD AUTO: 0.01 K/UL (ref 0–0.04)
IMM GRANULOCYTES NFR BLD AUTO: 0.1 % (ref 0–0.5)
LDLC SERPL CALC-MCNC: 98.2 MG/DL (ref 63–159)
LYMPHOCYTES # BLD AUTO: 2.5 K/UL (ref 1–4.8)
LYMPHOCYTES NFR BLD: 33.7 % (ref 18–48)
MCH RBC QN AUTO: 31.5 PG (ref 27–31)
MCHC RBC AUTO-ENTMCNC: 33.9 G/DL (ref 32–36)
MCV RBC AUTO: 93 FL (ref 82–98)
MONOCYTES # BLD AUTO: 0.8 K/UL (ref 0.3–1)
MONOCYTES NFR BLD: 10.3 % (ref 4–15)
NEUTROPHILS # BLD AUTO: 3.8 K/UL (ref 1.8–7.7)
NEUTROPHILS NFR BLD: 51.7 % (ref 38–73)
NONHDLC SERPL-MCNC: 122 MG/DL
NRBC BLD-RTO: 0 /100 WBC
PLATELET # BLD AUTO: 199 K/UL (ref 150–450)
PMV BLD AUTO: 9.9 FL (ref 9.2–12.9)
POTASSIUM SERPL-SCNC: 3.9 MMOL/L (ref 3.5–5.1)
PROT SERPL-MCNC: 7.1 G/DL (ref 6–8.4)
RBC # BLD AUTO: 4.09 M/UL (ref 4.6–6.2)
SODIUM SERPL-SCNC: 145 MMOL/L (ref 136–145)
TRIGL SERPL-MCNC: 119 MG/DL (ref 30–150)
URATE SERPL-MCNC: 9.2 MG/DL (ref 3.4–7)
WBC # BLD AUTO: 7.31 K/UL (ref 3.9–12.7)

## 2023-09-01 PROCEDURE — 36415 COLL VENOUS BLD VENIPUNCTURE: CPT | Performed by: FAMILY MEDICINE

## 2023-09-01 PROCEDURE — 85025 COMPLETE CBC W/AUTO DIFF WBC: CPT | Performed by: FAMILY MEDICINE

## 2023-09-01 PROCEDURE — 84153 ASSAY OF PSA TOTAL: CPT | Performed by: FAMILY MEDICINE

## 2023-09-01 PROCEDURE — 84550 ASSAY OF BLOOD/URIC ACID: CPT | Performed by: FAMILY MEDICINE

## 2023-09-01 PROCEDURE — 80053 COMPREHEN METABOLIC PANEL: CPT | Performed by: FAMILY MEDICINE

## 2023-09-01 PROCEDURE — 80061 LIPID PANEL: CPT | Performed by: FAMILY MEDICINE

## 2023-09-07 ENCOUNTER — LAB VISIT (OUTPATIENT)
Dept: LAB | Facility: HOSPITAL | Age: 68
End: 2023-09-07
Attending: FAMILY MEDICINE
Payer: MEDICARE

## 2023-09-07 ENCOUNTER — OFFICE VISIT (OUTPATIENT)
Dept: FAMILY MEDICINE | Facility: CLINIC | Age: 68
End: 2023-09-07
Payer: MEDICARE

## 2023-09-07 VITALS
DIASTOLIC BLOOD PRESSURE: 80 MMHG | OXYGEN SATURATION: 96 % | RESPIRATION RATE: 17 BRPM | HEIGHT: 68 IN | WEIGHT: 180.75 LBS | HEART RATE: 67 BPM | SYSTOLIC BLOOD PRESSURE: 115 MMHG | BODY MASS INDEX: 27.39 KG/M2 | TEMPERATURE: 98 F

## 2023-09-07 DIAGNOSIS — F41.9 ANXIETY: ICD-10-CM

## 2023-09-07 DIAGNOSIS — E78.2 MIXED HYPERLIPIDEMIA: ICD-10-CM

## 2023-09-07 DIAGNOSIS — R79.9 ABNORMAL FINDING OF BLOOD CHEMISTRY, UNSPECIFIED: ICD-10-CM

## 2023-09-07 DIAGNOSIS — M10.9 GOUT, ARTHRITIS: ICD-10-CM

## 2023-09-07 DIAGNOSIS — Z13.1 DIABETES MELLITUS SCREENING: ICD-10-CM

## 2023-09-07 DIAGNOSIS — G47.33 OSA ON CPAP: ICD-10-CM

## 2023-09-07 DIAGNOSIS — D64.9 NORMOCYTIC ANEMIA: ICD-10-CM

## 2023-09-07 DIAGNOSIS — N40.0 BENIGN PROSTATIC HYPERPLASIA, UNSPECIFIED WHETHER LOWER URINARY TRACT SYMPTOMS PRESENT: ICD-10-CM

## 2023-09-07 DIAGNOSIS — I10 ESSENTIAL HYPERTENSION: ICD-10-CM

## 2023-09-07 DIAGNOSIS — D64.9 NORMOCYTIC ANEMIA: Primary | ICD-10-CM

## 2023-09-07 DIAGNOSIS — I70.0 AORTIC ATHEROSCLEROSIS: ICD-10-CM

## 2023-09-07 LAB
BASOPHILS # BLD AUTO: 0.03 K/UL (ref 0–0.2)
BASOPHILS NFR BLD: 0.4 % (ref 0–1.9)
DIFFERENTIAL METHOD: ABNORMAL
EOSINOPHIL # BLD AUTO: 0.3 K/UL (ref 0–0.5)
EOSINOPHIL NFR BLD: 3.4 % (ref 0–8)
ERYTHROCYTE [DISTWIDTH] IN BLOOD BY AUTOMATED COUNT: 13.3 % (ref 11.5–14.5)
ESTIMATED AVG GLUCOSE: 100 MG/DL (ref 68–131)
FERRITIN SERPL-MCNC: 168 NG/ML (ref 20–300)
HBA1C MFR BLD: 5.1 % (ref 4–5.6)
HCT VFR BLD AUTO: 39.2 % (ref 40–54)
HGB BLD-MCNC: 12.7 G/DL (ref 14–18)
IMM GRANULOCYTES # BLD AUTO: 0.02 K/UL (ref 0–0.04)
IMM GRANULOCYTES NFR BLD AUTO: 0.3 % (ref 0–0.5)
IRON SERPL-MCNC: 117 UG/DL (ref 45–160)
LYMPHOCYTES # BLD AUTO: 2.2 K/UL (ref 1–4.8)
LYMPHOCYTES NFR BLD: 29.9 % (ref 18–48)
MCH RBC QN AUTO: 30.4 PG (ref 27–31)
MCHC RBC AUTO-ENTMCNC: 32.4 G/DL (ref 32–36)
MCV RBC AUTO: 94 FL (ref 82–98)
MONOCYTES # BLD AUTO: 0.8 K/UL (ref 0.3–1)
MONOCYTES NFR BLD: 11 % (ref 4–15)
NEUTROPHILS # BLD AUTO: 4 K/UL (ref 1.8–7.7)
NEUTROPHILS NFR BLD: 55 % (ref 38–73)
NRBC BLD-RTO: 0 /100 WBC
PLATELET # BLD AUTO: 210 K/UL (ref 150–450)
PMV BLD AUTO: 10.9 FL (ref 9.2–12.9)
RBC # BLD AUTO: 4.18 M/UL (ref 4.6–6.2)
SATURATED IRON: 38 % (ref 20–50)
TOTAL IRON BINDING CAPACITY: 306 UG/DL (ref 250–450)
TRANSFERRIN SERPL-MCNC: 207 MG/DL (ref 200–375)
WBC # BLD AUTO: 7.29 K/UL (ref 3.9–12.7)

## 2023-09-07 PROCEDURE — 99214 OFFICE O/P EST MOD 30 MIN: CPT | Mod: S$PBB,,, | Performed by: FAMILY MEDICINE

## 2023-09-07 PROCEDURE — 99999 PR PBB SHADOW E&M-EST. PATIENT-LVL III: ICD-10-PCS | Mod: PBBFAC,,, | Performed by: FAMILY MEDICINE

## 2023-09-07 PROCEDURE — 99214 PR OFFICE/OUTPT VISIT, EST, LEVL IV, 30-39 MIN: ICD-10-PCS | Mod: S$PBB,,, | Performed by: FAMILY MEDICINE

## 2023-09-07 PROCEDURE — 85025 COMPLETE CBC W/AUTO DIFF WBC: CPT | Performed by: FAMILY MEDICINE

## 2023-09-07 PROCEDURE — 36415 COLL VENOUS BLD VENIPUNCTURE: CPT | Mod: PO | Performed by: FAMILY MEDICINE

## 2023-09-07 PROCEDURE — 84466 ASSAY OF TRANSFERRIN: CPT | Performed by: FAMILY MEDICINE

## 2023-09-07 PROCEDURE — 83540 ASSAY OF IRON: CPT | Performed by: FAMILY MEDICINE

## 2023-09-07 PROCEDURE — 99213 OFFICE O/P EST LOW 20 MIN: CPT | Mod: PBBFAC,PO | Performed by: FAMILY MEDICINE

## 2023-09-07 PROCEDURE — 82728 ASSAY OF FERRITIN: CPT | Performed by: FAMILY MEDICINE

## 2023-09-07 PROCEDURE — 83036 HEMOGLOBIN GLYCOSYLATED A1C: CPT | Performed by: FAMILY MEDICINE

## 2023-09-07 PROCEDURE — 99999 PR PBB SHADOW E&M-EST. PATIENT-LVL III: CPT | Mod: PBBFAC,,, | Performed by: FAMILY MEDICINE

## 2023-09-07 RX ORDER — MELOXICAM 7.5 MG/1
7.5 TABLET ORAL DAILY
COMMUNITY

## 2023-09-07 NOTE — PROGRESS NOTES
Subjective:       Patient ID: Ang Oden Jr. is a 67 y.o. male.    Chief Complaint: Annual Exam    HPI  Review of Systems   Constitutional:  Negative for fatigue and unexpected weight change.   Respiratory:  Negative for chest tightness and shortness of breath.    Cardiovascular:  Negative for chest pain, palpitations and leg swelling.   Gastrointestinal:  Negative for abdominal pain and blood in stool.   Musculoskeletal:  Negative for arthralgias.   Neurological:  Negative for dizziness, syncope, light-headedness and headaches.       Patient Active Problem List   Diagnosis    Olecranon bursitis    Gout, arthritis    Hyperlipidemia    Anemia, Hgb 13.1    Lumbago due to displacement of intervertebral disc    Male hypogonadism    Alcohol induced fatty liver    KYLEIGH (obstructive sleep apnea), not using CPAP    Kidney calculi    BPH (benign prostatic hypertrophy)    Diverticulosis    Cardiovascular risk factor, FCVRS 5% on Zocor    Insomnia    Anxiety    Allergic rhinitis    Essential hypertension    History of alcohol abuse    Dysphagia    Pre-hypertension    Aortic atherosclerosis     Patient is here for a chronic conditions follow up.    Reviewed labs 9/2023      Derm Dr. Charles treating basal cell CA and AK     Ortho Dr. Silva treating left wrist arthritis. Steroid injections help some. Takes meloxicam prn. C/o lebron wrist pain left > right. 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         Card Dr. EVELYN sheriff treating KYLEIGH not using CPAP. HPL controlled     Uric acid 9.2. Rare gout attacks- off allopurinol     urology Dr. Hartmann/now [facility] -bph , kidney stone.   Uses flomax only when has kidney stones.  No LUTS from BPH. Has been passing more stones recently.      Chronic intermittent LBP. See chiropractor  Dr. adeline Mendiola doing well on celexa. Wants to do trial off     Colonoscopy Dr. Santiago 2021 1 hyperplastic polyp. On 5 year  surveillance  Objective:      Physical Exam  Vitals and nursing note reviewed.   Constitutional:       Appearance: He is well-developed.   Cardiovascular:      Rate and Rhythm: Normal rate and regular rhythm.      Heart sounds: Normal heart sounds.   Pulmonary:      Effort: Pulmonary effort is normal.      Breath sounds: Normal breath sounds.   Skin:     General: Skin is warm and dry.   Neurological:      Mental Status: He is alert and oriented to person, place, and time.         Assessment:       1. Normocytic anemia    2. Diabetes mellitus screening    3. Abnormal finding of blood chemistry, unspecified    4. Mixed hyperlipidemia    5. Aortic atherosclerosis    6. Essential hypertension    7. Benign prostatic hyperplasia, unspecified whether lower urinary tract symptoms present    8. Gout, arthritis    9. KYLEIGH on CPAP    10. Anxiety        Plan:         1. Normocytic anemia  Screen and treat as indicated:  ? Diet related deficiency. Recommend MVI for men  - CBC Auto Differential; Future  - Ferritin; Future  - Iron and TIBC; Future    2. Diabetes mellitus screening  Screen and treat as indicated:    - Hemoglobin A1C; Future    3. Abnormal finding of blood chemistry, unspecified  Screen and treat as indicated:    - Hemoglobin A1C; Future    4. Mixed hyperlipidemia  Controlled on current medications.  Continue current medications.      5. Aortic atherosclerosis  Cont to lower risk factors    6. Essential hypertension  Controlled on current medications.  Continue current medications.      7. Benign prostatic hyperplasia, unspecified whether lower urinary tract symptoms present  Cont current mgmt    8. Gout, arthritis  Cont monitoring and treat if joint sx    9. KYLEIGH on CPAP  Cont CPAP consistently    10. Anxiety  Trial off celexa  '    Time spent with patient: 20 minutes    Patient with be reevaluated in 6 months or sooner prn    Greater than 50% of this visit was spent counseling as described in above  documentation:Yes

## 2023-09-11 ENCOUNTER — PATIENT MESSAGE (OUTPATIENT)
Dept: FAMILY MEDICINE | Facility: CLINIC | Age: 68
End: 2023-09-11
Payer: MEDICARE

## 2023-09-11 RX ORDER — ATORVASTATIN CALCIUM 10 MG/1
TABLET, FILM COATED ORAL
Qty: 30 TABLET | Refills: 0 | Status: SHIPPED | OUTPATIENT
Start: 2023-09-11 | End: 2024-03-07

## 2023-09-15 DIAGNOSIS — F41.1 GAD (GENERALIZED ANXIETY DISORDER): ICD-10-CM

## 2023-09-15 RX ORDER — CITALOPRAM 20 MG/1
TABLET, FILM COATED ORAL
Qty: 90 TABLET | Refills: 3 | OUTPATIENT
Start: 2023-09-15

## 2023-09-15 NOTE — TELEPHONE ENCOUNTER
Refill Decision Note   Ang Cecille  is requesting a refill authorization.  Brief Assessment and Rationale for Refill:  Quick Discontinue     Medication Therapy Plan:  Pt currently not taking celexa as of 9/7      Comments:     Note composed:8:57 AM 09/15/2023

## 2023-09-15 NOTE — TELEPHONE ENCOUNTER
No care due was identified.  [facility] Embedded Care Due Messages. Reference number: [id number].   9/15/2023 12:26:41 AM CDT

## 2023-09-25 ENCOUNTER — TELEPHONE (OUTPATIENT)
Dept: ORTHOPEDICS | Facility: CLINIC | Age: 68
End: 2023-09-25
Payer: MEDICARE

## 2023-09-25 NOTE — TELEPHONE ENCOUNTER
----- Message from Jacqui Virk MA sent at 9/25/2023  2:16 PM CDT -----  Contact: pt  Wants injections   Was seeing Ricardo.   Call back

## 2023-09-25 NOTE — TELEPHONE ENCOUNTER
Called and spoke with the pt and informed that his appt is scheduled on 09/27/23 at 8.45 am at [place] location, pt verbalized understanding and was thankful.

## 2023-09-26 DIAGNOSIS — M25.532 LEFT WRIST PAIN: Primary | ICD-10-CM

## 2023-09-27 ENCOUNTER — OFFICE VISIT (OUTPATIENT)
Dept: ORTHOPEDICS | Facility: CLINIC | Age: 68
End: 2023-09-27
Payer: MEDICARE

## 2023-09-27 ENCOUNTER — HOSPITAL ENCOUNTER (OUTPATIENT)
Dept: RADIOLOGY | Facility: CLINIC | Age: 68
Discharge: HOME | End: 2023-09-27
Attending: UROLOGY
Payer: MEDICARE

## 2023-09-27 ENCOUNTER — HOSPITAL ENCOUNTER (OUTPATIENT)
Dept: RADIOLOGY | Facility: HOSPITAL | Age: 68
Discharge: HOME | End: 2023-09-27
Attending: PHYSICIAN ASSISTANT
Payer: MEDICARE

## 2023-09-27 DIAGNOSIS — R82.991 HYPOCITRATURIA: ICD-10-CM

## 2023-09-27 DIAGNOSIS — M25.532 LEFT WRIST PAIN: ICD-10-CM

## 2023-09-27 DIAGNOSIS — N20.0 NEPHROLITHIASIS: ICD-10-CM

## 2023-09-27 DIAGNOSIS — M19.032 ARTHRITIS OF LEFT WRIST: Primary | ICD-10-CM

## 2023-09-27 PROCEDURE — 73110 X-RAY EXAM OF WRIST: CPT | Mod: 26,LT,, | Performed by: RADIOLOGY

## 2023-09-27 PROCEDURE — 99213 OFFICE O/P EST LOW 20 MIN: CPT | Mod: S$PBB,25,, | Performed by: PHYSICIAN ASSISTANT

## 2023-09-27 PROCEDURE — 99999PBSHW PR PBB SHADOW TECHNICAL ONLY FILED TO HB: ICD-10-PCS | Mod: PBBFAC,,,

## 2023-09-27 PROCEDURE — 74018 XR ABDOMEN AP 1 VIEW: ICD-10-PCS | Mod: 26,,, | Performed by: RADIOLOGY

## 2023-09-27 PROCEDURE — 99999 PR PBB SHADOW E&M-EST. PATIENT-LVL II: ICD-10-PCS | Mod: PBBFAC,,, | Performed by: PHYSICIAN ASSISTANT

## 2023-09-27 PROCEDURE — 73110 XR WRIST COMPLETE 3 VIEWS LEFT: ICD-10-PCS | Mod: 26,LT,, | Performed by: RADIOLOGY

## 2023-09-27 PROCEDURE — 99213 PR OFFICE/OUTPT VISIT, EST, LEVL III, 20-29 MIN: ICD-10-PCS | Mod: S$PBB,25,, | Performed by: PHYSICIAN ASSISTANT

## 2023-09-27 PROCEDURE — 20605 DRAIN/INJ JOINT/BURSA W/O US: CPT | Mod: PBBFAC,PO | Performed by: PHYSICIAN ASSISTANT

## 2023-09-27 PROCEDURE — 20605 DRAIN/INJ JOINT/BURSA W/O US: CPT | Mod: S$PBB,LT,, | Performed by: PHYSICIAN ASSISTANT

## 2023-09-27 PROCEDURE — 74018 RADEX ABDOMEN 1 VIEW: CPT | Mod: 26,,, | Performed by: RADIOLOGY

## 2023-09-27 PROCEDURE — 99999 PR PBB SHADOW E&M-EST. PATIENT-LVL II: CPT | Mod: PBBFAC,,, | Performed by: PHYSICIAN ASSISTANT

## 2023-09-27 PROCEDURE — 20605 INTERMEDIATE JOINT ASPIRATION/INJECTION: L RADIOCARPAL: ICD-10-PCS | Mod: S$PBB,LT,, | Performed by: PHYSICIAN ASSISTANT

## 2023-09-27 PROCEDURE — 74018 RADEX ABDOMEN 1 VIEW: CPT | Mod: TC,FY,PO

## 2023-09-27 PROCEDURE — 73110 X-RAY EXAM OF WRIST: CPT | Mod: TC,PO,LT

## 2023-09-27 PROCEDURE — 99212 OFFICE O/P EST SF 10 MIN: CPT | Mod: PBBFAC,PO | Performed by: PHYSICIAN ASSISTANT

## 2023-09-27 PROCEDURE — 99999PBSHW PR PBB SHADOW TECHNICAL ONLY FILED TO HB: Mod: PBBFAC,,,

## 2023-09-27 RX ORDER — TRIAMCINOLONE ACETONIDE 40 MG/ML
0.5 INJECTION, SUSPENSION INTRA-ARTICULAR; INTRAMUSCULAR
Status: DISCONTINUED | OUTPATIENT
Start: 2023-09-27 | End: 2023-09-27 | Stop reason: HOSPADM

## 2023-09-27 RX ADMIN — TRIAMCINOLONE ACETONIDE 0.5 ML: 40 INJECTION, SUSPENSION INTRA-ARTICULAR; INTRAMUSCULAR at 08:09

## 2023-09-27 NOTE — PROGRESS NOTES
[facility]  History & Physical  Orthopedics    SUBJECTIVE:      COVID-19 attestation:  This patient was treated during the COVID-19 pandemic.  This was discussed with the patient, they are aware of our current policies and procedures, were given the option of delaying their visit and or switching to a virtual visit, delaying their surgery when applicable, and they elect to proceed.    Chief Complaint:  Bilateral wrist pain    Referring Provider: No ref. provider found     History of Present Illness:  Patient is a 67 y.o. left hand dominant male who presents today with complaints of bilateral wrist pain.  The patient notes this has been present for approximately 5 years.  He does remember trauma certainly to left wrist with a known fracture that was treated conservatively and also potentially the right wrist.  He denies any recent injuries.  He notes that the left wrist was for sure placed into a cast at some point.  These fractures were years ago.  His pain is moderate and is worsening slowly over time.  Denies numbness or tingling or other complaints and presents today for initial evaluation.     Interval history August 18, 2022:  The patient returns today for re-evaluation of his bilateral wrist pain.  He notes that his prior corticosteroid injections helped tremendously but his pain has now returned.  He would like to discuss additional options today and has no new complaints.    Interval History 9/27/23: the patient returns today for repeat left wrist steroid injection. Previous bilateral wrist steroid injections performed in August 2022 with excellent pain relief lasting about 9 months. He reports holding off until his pain is significant. Currently, the right wrist is doing ok.     The patient is a/an retired.    Onset of symptoms/DOI was 6 years ago.    Symptoms are aggravated by activity and movement.    Symptoms are alleviated by rest.    Symptoms consist of pain and decreased  ROM.    The patient rates their pain as a 5/10.    Attempted treatment(s) and/or interventions include activity modifications, rest, rest and activity modification, steroid injections.     The patient denies any fevers, chills, N/V, D/C and presents for evaluation.       Past Medical History:   Diagnosis Date    Anemia     Anticoagulant long-term use     Chronic kidney disease     Chronic rhinitis     Disc     lower back    DJD (degenerative joint disease)     Foot pain     left foot    Gout, unspecified     HBP (high blood pressure)     High cholesterol     Kidney stone     Sleep apnea     Weight loss     15 pounds in two months     Past Surgical History:   Procedure Laterality Date    APPENDECTOMY      COLONOSCOPY N/A 11/11/2021    Procedure: COLONOSCOPY;  Surgeon: Christian Santiago MD;  Location: [facility];  Service: Endoscopy;  Laterality: N/A;    ELBOW SURGERY      VASECTOMY       Review of patient's allergies indicates:   Allergen Reactions    No known drug allergies      Social History     Social History Narrative    Not on file     Family History   Problem Relation Age of Onset    Cancer Mother     Heart disease Mother     Heart failure Mother     Liver disease Father         secondary to alcohol    Diabetes Maternal Grandmother     Allergies Son     Asthma Son     Urolithiasis Neg Hx     Prostate cancer Neg Hx     Kidney cancer Neg Hx     Eczema Neg Hx     Immunodeficiency Neg Hx     Angioedema Neg Hx     Allergic rhinitis Neg Hx     Atopy Neg Hx     Rhinitis Neg Hx     Urticaria Neg Hx          Current Outpatient Medications:     aspirin (ECOTRIN) 81 MG EC tablet, Take by mouth. 1 Tablet, Delayed Release (E.C.) Oral Every day, Disp: , Rfl:     atorvastatin (LIPITOR) 10 MG tablet, TAKE 1 TABLET BY MOUTH EVERY DAY, Disp: 30 tablet, Rfl: 0    azelastine (ASTELIN) 137 mcg (0.1 %) nasal spray, SPRAY 1 SPRAY IN EACH NOSTRIL 2 TIMES A DAY, Disp: 90 mL, Rfl: 3    fluticasone propionate (FLONASE) 50 mcg/actuation  nasal spray, 1 SPRAY (50 MCG TOTAL) BY EACH NOSTRIL ROUTE ONCE DAILY., Disp: 48 mL, Rfl: PRN    meloxicam (MOBIC) 7.5 MG tablet, Take 7.5 mg by mouth once daily., Disp: , Rfl:     mometasone 0.1% (ELOCON) 0.1 % cream, Apply topically 2 (two) times daily as needed., Disp: 15 g, Rfl: 3    tamsulosin (FLOMAX) 0.4 mg Cap, TAKE 1 CAPSULE BY MOUTH EVERY DAY, Disp: 90 capsule, Rfl: 1      Review of Systems:  Constitutional: no fever or chills  Eyes: no visual changes  ENT: no nasal congestion or sore throat  Respiratory: no cough or shortness of breath  Cardiovascular: no chest pain  Gastrointestinal: no nausea or vomiting, tolerating diet  Musculoskeletal: pain, soreness and decreased ROM    OBJECTIVE:      Vital Signs (Most Recent):  There were no vitals filed for this visit.    There is no height or weight on file to calculate BMI.      Physical Exam:  Constitutional: The patient appears well-developed and well-nourished. No distress.   Skin: No lesions appreciated  Head: Normocephalic and atraumatic.   Nose: Nose normal.   Ears: No deformities seen  Eyes: Conjunctivae and EOM are normal.   Neck: No tracheal deviation present.   Cardiovascular: Normal rate and intact distal pulses.    Pulmonary/Chest: Effort normal. No respiratory distress.   Abdominal: There is no guarding.   Neurological: The patient is alert.   Psychiatric: The patient has a normal mood and affect.     Bilateral Hand/Wrist Examination:    Observation/Inspection:  Swelling  none    Deformity  none  Discoloration  none     Scars   none    Atrophy  None  Global tenderness to palpation bilateral wrists    HAND/WRIST EXAMINATION:  Finkelstein's Test   Neg  WHAT Test    Neg  Snuff box tenderness   Neg  Cervantes's Test    Neg  Hook of Hamate Tenderness  Neg  CMC grind    positive on the right  Circumduction test   positive on the right    Neurovascular Exam:  Digits WWP, brisk CR < 3s throughout  NVI motor/LTS to M/R/U nerves, radial pulse 2+  Tinel's Test -  Carpal Tunnel  Neg  Tinel's Test - Cubital Tunnel  Neg  Phalen's Test    Neg  Median Nerve Compression Test Neg    ROM hand full, painless    ROM wrist significantly limited in the left greater than right wrists; extension is to approximately 20 in flexion is to 20 on the left, 30-30 in the right    ROM elbow full, painless    Abdomen not guarded  Respirations nonlabored  Perfusion intact    Diagnostic Results:     Imaging - I independently viewed the patient's imaging as well as the radiology report.  Xrays of the patient's bilateral wrist  demonstrate some posttraumatic radius deformity with scapholunate advanced collapse bilaterally  EMG - none    ASSESSMENT/PLAN:      67 y.o. 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  We discussed the working diagnosis as well as several other potential alternative diagnoses.  Treatment options were discussed, both conservative and surgical.  Conservative treatment options would include things such as activity modifications, workplace modifications, a period of rest, oral vs topical OTC and prescription anti-inflammatory medications, occupational therapy, splinting/bracing, immobilization, corticosteroid injections, and others.  Surgical options were discussed as well.     At this time, the patient would like to proceed with a repeat corticosteroid injection to left wrist radiocarpal joints, performed today without any issues.  Recommend anti-inflammatory medications as needed for pain.        Should the patient's symptoms worsen, persist, or fail to improve they should return for reevaluation and I would be happy to see them back anytime.      Jamie Russo-DiGeorge PA-C  [facility]

## 2023-09-27 NOTE — PROCEDURES
Intermediate Joint Aspiration/Injection: L radiocarpal    Date/Time: 9/27/2023 8:45 AM    Performed by: Russo-Digeorge, Jamie L., PA-C  Authorized by: Russo-Digeorge, Jamie L., PA-C    Consent Done?:  Yes (Verbal)  Indications:  Pain  Site marked: The procedure site was marked    Timeout: Prior to procedure the correct patient, procedure, and site was verified      Location:  Wrist  Site:  L radiocarpal  Prep: Patient was prepped and draped in usual sterile fashion    Ultrasonic Guidance for needle placement: No  Needle size:  25 G  Approach:  Dorsal  Medications:  0.5 mL triamcinolone acetonide 40 mg/mL  Patient tolerance:  Patient tolerated the procedure well with no immediate complications

## 2023-10-03 RX ORDER — ATORVASTATIN CALCIUM 10 MG/1
TABLET, FILM COATED ORAL
Qty: 30 TABLET | Refills: 0 | OUTPATIENT
Start: 2023-10-03

## 2023-10-05 ENCOUNTER — PATIENT MESSAGE (OUTPATIENT)
Dept: UROLOGY | Facility: CLINIC | Age: 68
End: 2023-10-05

## 2023-10-05 ENCOUNTER — HOSPITAL ENCOUNTER (OUTPATIENT)
Dept: RADIOLOGY | Facility: HOSPITAL | Age: 68
Discharge: HOME | End: 2023-10-05
Attending: UROLOGY
Payer: MEDICARE

## 2023-10-05 ENCOUNTER — OFFICE VISIT (OUTPATIENT)
Dept: UROLOGY | Facility: CLINIC | Age: 68
End: 2023-10-05
Payer: MEDICARE

## 2023-10-05 VITALS
WEIGHT: 178 LBS | HEART RATE: 62 BPM | SYSTOLIC BLOOD PRESSURE: 138 MMHG | BODY MASS INDEX: 26.98 KG/M2 | DIASTOLIC BLOOD PRESSURE: 83 MMHG | HEIGHT: 68 IN

## 2023-10-05 DIAGNOSIS — R82.991 HYPOCITRATURIA: ICD-10-CM

## 2023-10-05 DIAGNOSIS — N20.0 NEPHROLITHIASIS: Primary | ICD-10-CM

## 2023-10-05 DIAGNOSIS — N20.0 NEPHROLITHIASIS: ICD-10-CM

## 2023-10-05 DIAGNOSIS — Z12.5 ENCOUNTER FOR SCREENING FOR MALIGNANT NEOPLASM OF PROSTATE: ICD-10-CM

## 2023-10-05 LAB
BILIRUBIN, UA POC OHS: NEGATIVE
BLOOD, UA POC OHS: ABNORMAL
CLARITY, UA POC OHS: CLEAR
COLOR, UA POC OHS: YELLOW
GLUCOSE, UA POC OHS: NEGATIVE
KETONES, UA POC OHS: NEGATIVE
LEUKOCYTES, UA POC OHS: ABNORMAL
NITRITE, UA POC OHS: NEGATIVE
PH, UA POC OHS: 5.5
PROTEIN, UA POC OHS: NEGATIVE
SPECIFIC GRAVITY, UA POC OHS: >=1.03
UROBILINOGEN, UA POC OHS: 0.2

## 2023-10-05 PROCEDURE — 81003 URINALYSIS AUTO W/O SCOPE: CPT | Mod: PBBFAC,PO | Performed by: UROLOGY

## 2023-10-05 PROCEDURE — 99214 OFFICE O/P EST MOD 30 MIN: CPT | Mod: S$PBB,,, | Performed by: UROLOGY

## 2023-10-05 PROCEDURE — 76770 US EXAM ABDO BACK WALL COMP: CPT | Mod: 26,,, | Performed by: RADIOLOGY

## 2023-10-05 PROCEDURE — 99999 PR PBB SHADOW E&M-EST. PATIENT-LVL III: ICD-10-PCS | Mod: PBBFAC,,, | Performed by: UROLOGY

## 2023-10-05 PROCEDURE — 99999PBSHW POCT URINALYSIS(INSTRUMENT): Mod: PBBFAC,,,

## 2023-10-05 PROCEDURE — 76770 US RETROPERITONEAL COMPLETE: ICD-10-PCS | Mod: 26,,, | Performed by: RADIOLOGY

## 2023-10-05 PROCEDURE — 99213 OFFICE O/P EST LOW 20 MIN: CPT | Mod: PBBFAC,PO | Performed by: UROLOGY

## 2023-10-05 PROCEDURE — 99999PBSHW POCT URINALYSIS(INSTRUMENT): ICD-10-PCS | Mod: PBBFAC,,,

## 2023-10-05 PROCEDURE — 99214 PR OFFICE/OUTPT VISIT, EST, LEVL IV, 30-39 MIN: ICD-10-PCS | Mod: S$PBB,,, | Performed by: UROLOGY

## 2023-10-05 PROCEDURE — 76770 US EXAM ABDO BACK WALL COMP: CPT | Mod: TC

## 2023-10-05 PROCEDURE — 99999 PR PBB SHADOW E&M-EST. PATIENT-LVL III: CPT | Mod: PBBFAC,,, | Performed by: UROLOGY

## 2023-10-05 RX ORDER — POTASSIUM CITRATE 15 MEQ/1
15 TABLET, EXTENDED RELEASE ORAL 2 TIMES DAILY
Qty: 180 TABLET | Refills: 3 | Status: SHIPPED | OUTPATIENT
Start: 2023-10-05 | End: 2024-10-04

## 2023-10-05 RX ORDER — TRAZODONE HYDROCHLORIDE 50 MG/1
50 TABLET ORAL
COMMUNITY
Start: 2023-09-05

## 2023-10-05 RX ORDER — KETOROLAC TROMETHAMINE 10 MG/1
10 TABLET, FILM COATED ORAL EVERY 8 HOURS PRN
Qty: 10 TABLET | Refills: 0 | Status: SHIPPED | OUTPATIENT
Start: 2023-10-05 | End: 2023-10-10

## 2023-10-05 RX ORDER — TAMSULOSIN HYDROCHLORIDE 0.4 MG/1
1 CAPSULE ORAL DAILY
Qty: 90 CAPSULE | Refills: 1 | Status: SHIPPED | OUTPATIENT
Start: 2023-10-05 | End: 2024-01-02

## 2023-10-05 NOTE — PROGRESS NOTES
[facility], formerly known as [facility]  Group MD's:Rocío/Mary Kate/Salbador/Arden/Yessi  Group NP's: Angelica Siegel, Karey Clements    PCP: Jordana Anderson MD  Date of Service: 10/05/2023  Today's note written by: MD Rocío    CHIEF COMPLAINT:    Mr. Oden is a 67 y.o. male presenting for kidney stones  PRESENTING ILLNESS:    Ang Oden Jr. is a 67 y.o. male with a PMH of BPH, kidney stones, HTN, Gout who presents for kidney stones. Last clinic visit was 1/19/21 with Dr. Hartmann.  Ctrss 2/17/19- multiple B renal stones  Ctrss 1/7/20- had multiple R renal stones. A few left renal stones.   Ctrss 11/20/20 -only 1 right renal stone. A few left renal stones.   Patient was seen in ED 7/7/22 for right flank pain. Patient reported that he passed a small stone that morning. Discharged home with norco, zofran, ketorolac, and flomax. UA negative  CT RSS 7/7/22: The kidneys are of normal size contour and CT density for a noncontrast study.  On the right or 10 intrarenal calcifications consistent with stones.  The largest measures 6 mm.  Hydronephrosis or ureteral stone is not seen.  On the left are 12 intrarenal calcifications.  The largest measures 4 mm.  Hydronephrosis or ureteral stone is not seen on this side.  The bladder is of normal contour without mass or asymmetry  7/14/22 visit with Karey patient presents to clinic for f/u kidney stones. Patient reports he has passed a few stones since ED visit. Only symptom is flank pain. Denies dysuria, gross hematuria, fever, chills, nausea or vomiting. Denies difficulty voiding. He takes flomax when he is passing stone and needs refill. He also request refill for pain medication. His previous urologist would give him pain medication and flomax to keep on hand if needed for stones. He was not able to collect any stones that he recently passed.  Patient reports he does pass stones very often and has not ever needed surgery to  remove stones.   Drinks: does not drink as much water as he should  3/14/19 Stone analysis resulted 100% calcium oxalate stone  He does have hx of Gout but no uric acid stone that patient is aware of. No recent Gout attacks since he stopped drinking alcohol.    Interval history 8/16/22:  He was c/o pain and so she sent him for ct. Ct essentially unchanged. No ureteral stones, no hydro. However continues to pass small fragments. Does ride a motorcycle about 4x a week. Stones 8/2: 100% calcium oxalate. Ca on last bmp 7/7/22 9.2  He has had stones for 10 to 15 years or more. No family hx. Review of ct in 2020 shows fewer stones. Doesn't appear to have done a 24 hour urine in the charts but thinks he's done before, parathyroid hormone or has never been a medication for kidney stones. He's passed stones as big as a qtip. Meds: no topamax, vit D or calcium.   Only takes flomax as needed although ct shows stones in prostate and intravesical portion of prostate. Otherwise no problems with urinating.   ua today is negative    ctrss 7/21/22:          Interval history 10/18/22:  Here to discuss 24 hour urine done 8/25/22. Shows low UOP: 1.03L and low citrate 284. Pth 46.8. his citrate in 2019 was normal at 689 and his ct then in 2019 was the same as ct now but ct in 2020 actually showed less stones in R kidney. He had low uop then as well. Says he doesn't like drinking too much fluids, causes him to bloat.           He said he's passed stones about 3x a week since I last saw him. He says he only likes to take flomax when he needs to- if he's passing a stone, but he's been taking every day for the last 2 months bc he's been passing stones.  He says he notices a stronger flow but wants to stop the medication if he can bc he doesn't like to take meds.   Largest stone he has passed is  about 6mm  He said he's still having R flank pain that is constant but worse when he's about to pass another stone. The right flank pain has never  gone away since his ct in august. No images since august 2022.  Gave him toradol last visit. He says didn't help with pain.     Interval history by ME in CLINIC on 10/5/23 for kidney stones:  Had him do a PSA and it was 0.68 on 09/01/2023.  Same exact as last year on 07/21/2022.  Denies any difficulty urinating except for when he is trying to pass stones.    He says he passed a stone last week, about 2 mm.  A few months ago he passed stones daily for a month.  Right flank pain radiating to his right groin.  He is about to run out of Flomax.  His urine today shows trace blood and trace leukocyte.  No fevers or chills.    Review of his CT from last year showed that he would 10+ stones on each side, the largest measuring about 5 mm in the right.  His recent KUB (Xray of the abdomen to look for stones) on 09/27/2023 showed bilateral small stones.  None appearing in the ureter.  Did not get an ultrasound yet.    Has not really been purposely trying to increase citrate intake.  We discussed again today    Ctrss 10/18/22        Kub 9/27/23        Urine history: family history of kidney, bladder or prostate cancer:No, personal or family history of kidney stones: Yes - personal, no family ,tobacco use: Yes - quit 30 yrs ago -smoked for 10 years, anticoagulation: No  10/5/23 Tr leuk/tr bld  10/18/22 Neg   8/16/22 Neg  7/14/22 Neg  7/7/22  Neg  7/14/20 Neg  1/7/20  Ng, void: tr bld  2/17/19 2+bld  6/28/16 2+bld  11/4/15 Neg  9/25/15 3+bld  11/16/11 Neg     PSA History: no fam hx of prostate cancer\  9/1/23 0.68  Component PSA, Screen PSA Diagnostic   Latest Ref Rng & Units 0.00 - 4.00 ng/mL 0.00 - 4.00 ng/mL   7/21/2022 0.68    1/19/2021  0.83   1/7/2020  0.85   12/7/2018 0.46    3/8/2017 0.40    11/4/2015 0.43    1/30/2014 0.38    11/3/2012 0.56    1/7/2012 0.52    12/28/2010 0.36      REVIEW OF SYSTEMS:  As above    PHYSICAL EXAMINATION:  There were no vitals filed for this visit.     exam as above     Recent Labs   Lab  08/30/22  0739 09/01/23  0613 09/07/23  1510   WBC 7.07 7.31 7.29   Hemoglobin 13.2 L 12.9 L 12.7 L   Hematocrit 38.2 L 38.0 L 39.2 L   Platelets 186 199 210   ]  Recent Labs   Lab 07/07/22  1141 08/30/22  0739 09/01/23  0613   Sodium 140 143 145   Potassium 3.8 4.0 3.9   Chloride 104 110 110   CO2 26 25 23   BUN 14 21 13   Creatinine 1.1 1.0 1.1   Glucose 89 90 90   Calcium 9.2 9.4 9.3   Alkaline Phosphatase 65 53 L 57   Total Protein 7.0 6.8 7.1   Albumin 4.0 3.8 3.9   Total Bilirubin 1.0 1.0 1.0   AST 22 23 25   ALT 20 22 19   ]      IMPRESSION:    Ang Oden Jr. is a 67 y.o. male    Encounter Diagnoses   Name Primary?    Nephrolithiasis Yes    Hypocitraturia          Plan:  Sending in Toradol and Flomax for him to take as needed for stone passage.  Toradol as needed for pain.  Dispense 10.  Flomax 0.4 mg but take at night until he passed a stone fragments.  Both help relax the ureter to allow stone passage    Will obtain a renal bladder ultrasound at next availability to see if he has any hydro and if so may need a CT scan to see if he has any large obstructing stones that may be too difficult to pass    Counseled he should go to the ER if he develops any fevers or chills plus flank pain as he could get septic if urine is not allowed to pass    Will start potassium citrate 15 mEq twice a day.  Giant horse pill.  Will see in the stool.  The goal is to increase citrate.  Can also do this with diet.  Send him a message via [employer].  Also can do this with supplements that maybe more tolerable than potassium citrate pills itself.  Would take for moon stone citrate a day.  About 30 dollars a month.    At minimum renal bladder ultrasounds, KUB (Xray of the abdomen to look for stones) in PSA in a year

## 2023-10-05 NOTE — PATIENT INSTRUCTIONS
Encounter Diagnoses   Name Primary?    Nephrolithiasis Yes    Hypocitraturia          Plan:  Sending in Toradol and Flomax for him to take as needed for stone passage.  Toradol as needed for pain.  Dispense 10.  Flomax 0.4 mg but take at night until he passed a stone fragments.  Both help relax the ureter to allow stone passage    Will obtain a renal bladder ultrasound at next availability to see if he has any hydro and if so may need a CT scan to see if he has any large obstructing stones that may be too difficult to pass    Counseled he should go to the ER if he develops any fevers or chills plus flank pain as he could get septic if urine is not allowed to pass    Will start potassium citrate 15 mEq twice a day.  Giant horse pill.  Will see in the stool.  The goal is to increase citrate.  Can also do this with diet.  Send him a message via [employer].  Also can do this with supplements that maybe more tolerable than potassium citrate pills itself.  Would take for moon stone citrate a day.  About 30 dollars a month.    At minimum renal bladder ultrasounds, KUB (Xray of the abdomen to look for stones) in PSA in a year

## 2023-11-02 ENCOUNTER — PATIENT MESSAGE (OUTPATIENT)
Dept: FAMILY MEDICINE | Facility: CLINIC | Age: 68
End: 2023-11-02
Payer: MEDICARE

## 2023-11-02 DIAGNOSIS — F41.1 GAD (GENERALIZED ANXIETY DISORDER): ICD-10-CM

## 2023-11-02 RX ORDER — CITALOPRAM 20 MG/1
TABLET, FILM COATED ORAL
Qty: 90 TABLET | Refills: 3 | OUTPATIENT
Start: 2023-11-02

## 2023-11-02 RX ORDER — CITALOPRAM 20 MG/1
20 TABLET, FILM COATED ORAL DAILY
Qty: 90 TABLET | Refills: 3 | Status: SHIPPED | OUTPATIENT
Start: 2023-11-02

## 2023-11-02 NOTE — TELEPHONE ENCOUNTER
Refill Decision Note   Ang Oden  is requesting a refill authorization.  Brief Assessment and Rationale for Refill:  Quick Discontinue     Medication Therapy Plan:  The original prescription was discontinued on 9/7/2023 by Jordana Anderson MD      Comments:     Note composed:1:02 PM 11/02/2023             Appointments     Last Visit   9/7/2023 Jordana Anderson MD   Next Visit   3/7/2024 Jordana Anderson MD           Appointments     Last Visit   9/7/2023 Jordana Anderson MD   Next Visit   3/7/2024 Jordana Anderson MD

## 2023-11-02 NOTE — TELEPHONE ENCOUNTER
No care due was identified.  [facility] Embedded Care Due Messages. Reference number: [id number].   11/02/2023 12:42:29 PM CDT

## 2023-12-20 RX ORDER — ATORVASTATIN CALCIUM 10 MG/1
TABLET, FILM COATED ORAL
Qty: 30 TABLET | Refills: 0 | OUTPATIENT
Start: 2023-12-20

## 2023-12-30 DIAGNOSIS — N20.0 NEPHROLITHIASIS: ICD-10-CM

## 2024-01-02 RX ORDER — TAMSULOSIN HYDROCHLORIDE 0.4 MG/1
0.8 CAPSULE ORAL DAILY
Qty: 180 CAPSULE | Refills: 3 | Status: SHIPPED | OUTPATIENT
Start: 2024-01-02 | End: 2025-01-02

## 2024-01-04 RX ORDER — ATORVASTATIN CALCIUM 10 MG/1
10 TABLET, FILM COATED ORAL DAILY
Qty: 30 TABLET | Refills: 0 | OUTPATIENT
Start: 2024-01-04

## 2024-03-07 ENCOUNTER — OFFICE VISIT (OUTPATIENT)
Dept: FAMILY MEDICINE | Facility: CLINIC | Age: 69
End: 2024-03-07
Payer: MEDICARE

## 2024-03-07 VITALS
TEMPERATURE: 98 F | HEIGHT: 68 IN | WEIGHT: 179 LBS | OXYGEN SATURATION: 95 % | SYSTOLIC BLOOD PRESSURE: 120 MMHG | BODY MASS INDEX: 27.13 KG/M2 | DIASTOLIC BLOOD PRESSURE: 70 MMHG | RESPIRATION RATE: 18 BRPM | HEART RATE: 61 BPM

## 2024-03-07 DIAGNOSIS — F41.1 GAD (GENERALIZED ANXIETY DISORDER): ICD-10-CM

## 2024-03-07 DIAGNOSIS — D64.9 NORMOCYTIC ANEMIA: ICD-10-CM

## 2024-03-07 DIAGNOSIS — I70.0 AORTIC ATHEROSCLEROSIS: ICD-10-CM

## 2024-03-07 DIAGNOSIS — N20.0 KIDNEY CALCULI: ICD-10-CM

## 2024-03-07 DIAGNOSIS — Z12.5 PROSTATE CANCER SCREENING: ICD-10-CM

## 2024-03-07 DIAGNOSIS — K76.0 FATTY LIVER: ICD-10-CM

## 2024-03-07 DIAGNOSIS — I10 ESSENTIAL HYPERTENSION: Primary | ICD-10-CM

## 2024-03-07 DIAGNOSIS — G47.33 OSA ON CPAP: ICD-10-CM

## 2024-03-07 DIAGNOSIS — M25.532 LEFT WRIST PAIN: ICD-10-CM

## 2024-03-07 DIAGNOSIS — M10.9 GOUT, ARTHRITIS: ICD-10-CM

## 2024-03-07 DIAGNOSIS — E78.2 MIXED HYPERLIPIDEMIA: ICD-10-CM

## 2024-03-07 DIAGNOSIS — N40.0 BENIGN PROSTATIC HYPERPLASIA, UNSPECIFIED WHETHER LOWER URINARY TRACT SYMPTOMS PRESENT: ICD-10-CM

## 2024-03-07 PROCEDURE — 99999 PR PBB SHADOW E&M-EST. PATIENT-LVL IV: CPT | Mod: PBBFAC,,, | Performed by: FAMILY MEDICINE

## 2024-03-07 PROCEDURE — 99214 OFFICE O/P EST MOD 30 MIN: CPT | Mod: S$PBB,,, | Performed by: FAMILY MEDICINE

## 2024-03-07 PROCEDURE — G2211 COMPLEX E/M VISIT ADD ON: HCPCS | Mod: S$PBB,,, | Performed by: FAMILY MEDICINE

## 2024-03-07 PROCEDURE — 99214 OFFICE O/P EST MOD 30 MIN: CPT | Mod: PBBFAC,PO | Performed by: FAMILY MEDICINE

## 2024-03-07 NOTE — PROGRESS NOTES
Subjective:       Patient ID: Ang Oden Jr. is a 68 y.o. male.    Chief Complaint: Annual Exam    HPI  Review of Systems   Constitutional:  Negative for fatigue and unexpected weight change.   Respiratory:  Negative for chest tightness and shortness of breath.    Cardiovascular:  Negative for chest pain, palpitations and leg swelling.   Gastrointestinal:  Negative for abdominal pain.   Musculoskeletal:  Positive for arthralgias.   Neurological:  Negative for dizziness, syncope, light-headedness and headaches.       Patient Active Problem List   Diagnosis    Olecranon bursitis    Gout, arthritis    Hyperlipidemia    Anemia, Hgb 13.1    Lumbago due to displacement of intervertebral disc    Male hypogonadism    Alcohol induced fatty liver    KYLEIGH (obstructive sleep apnea), not using CPAP    Kidney calculi    BPH (benign prostatic hypertrophy)    Diverticulosis    Cardiovascular risk factor, FCVRS 5% on Zocor    Insomnia    Anxiety    Allergic rhinitis    Essential hypertension    History of alcohol abuse    Dysphagia    Pre-hypertension    Aortic atherosclerosis    KYLEIGH on CPAP    Normocytic anemia    SURAJ (generalized anxiety disorder)     Patient is here for a chronic conditions follow up.    Reviewed labs 9/2023        Derm Dr. Charles treating basal cell CA and AK     Ortho Dr. Silva treating left wrist arthritis. Steroid injections help some. Takes meloxicam prn. C/o lebron wrist pain left > right. 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         Card Dr. EVELYN sheriff treating KYLEIGH not using CPAP. HPL controlled     Uric acid 9.2. Rare gout attacks- off allopurinol     urology Dr. Hartmann/now Rocío -bph , kidney stone.   Uses flomax only when has kidney stones.  No LUTS from BPH. Has been passing more stones recently.      Chronic intermittent LBP. See chiropractor  Dr. adeline Mendiola doing well on celexa. Wants to do trial off      Colonoscopy Dr. Santiago 2021 1 hyperplastic polyp. On 5 year surveillance  Objective:      Physical Exam  Vitals and nursing note reviewed.   Constitutional:       Appearance: He is well-developed.   Cardiovascular:      Rate and Rhythm: Normal rate and regular rhythm.      Heart sounds: Normal heart sounds.   Pulmonary:      Effort: Pulmonary effort is normal.      Breath sounds: Normal breath sounds.   Musculoskeletal:      Left wrist: Tenderness, bony tenderness and snuff box tenderness present. No swelling. Decreased range of motion.   Skin:     General: Skin is warm and dry.   Neurological:      Mental Status: He is alert and oriented to person, place, and time.         Assessment:       1. Essential hypertension    2. Aortic atherosclerosis    3. Mixed hyperlipidemia    4. Gout, arthritis    5. Benign prostatic hyperplasia, unspecified whether lower urinary tract symptoms present    6. Normocytic anemia    7. KYLEIGH on CPAP    8. SURAJ (generalized anxiety disorder)    9. Kidney calculi    10. Alcohol induced fatty liver    11. Left wrist pain    12. Prostate cancer screening        Plan:         1. Essential hypertension  Controlled on current medications.  Continue current medications.      2. Aortic atherosclerosis  Cont to lower risk factors    3. Mixed hyperlipidemia  Stopped lipitor (ran out). Wants to do trial off. I recommend a heart healthy diet rich in fiber, fresh vegetables and fruit and low in saturated fats (fried foods, red meat, etc.).  I also recommend regular exercise including a minimum of 150 minutes of cardio exercise per week and 2-30 minute workouts of strength training like light weights, yoga, pilates, etc.  You should work toward a body mass index of < 25.    Stable condition.  Continue current medications.  Will adjust based on lab findings or if condition changes.    - Comprehensive Metabolic Panel; Future  - Lipid Panel; Future    4. Gout, arthritis  Screen and treat as  indicated:    - Uric Acid; Future    5. Benign prostatic hyperplasia, unspecified whether lower urinary tract symptoms present  Cont current mgmt    6. Normocytic anemia  Screen and treat as indicated:    - CBC Auto Differential; Future  - Ferritin; Future  - Iron and TIBC; Future    7. KYLEIGH on CPAP  Cont cpap use consistently    8. SURAJ (generalized anxiety disorder)  Cont current mgmt    9. Kidney calculi  Cont K citrate for prevention and urology care prn    10. Alcohol induced fatty liver    Avoid liver irritants like tylenol and alcohol, eat a low fat diet and work toward an ideal body weight to help lower liver inflammation.       11. Left wrist pain  Refer for eval and treat  - Ambulatory referral/consult to Orthopedics; Future    12. Prostate cancer screening  Discussed benefits, risks and limitations of PSA testing and current USPSTF guidelines.  Patient expressed verbal understanding and wished to pursue screening.    - PSA, Screening; Future      Time spent with patient: 20 minutes    Patient with be reevaluated in 6 months or sooner prn    Greater than 50% of this visit was spent counseling as described in above documentation:Yes

## 2024-03-11 ENCOUNTER — TELEPHONE (OUTPATIENT)
Dept: ORTHOPEDICS | Facility: CLINIC | Age: 69
End: 2024-03-11
Payer: MEDICARE

## 2024-03-14 ENCOUNTER — PATIENT MESSAGE (OUTPATIENT)
Dept: FAMILY MEDICINE | Facility: CLINIC | Age: 69
End: 2024-03-14
Payer: MEDICARE

## 2024-03-14 DIAGNOSIS — R53.83 FATIGUE, UNSPECIFIED TYPE: Primary | ICD-10-CM

## 2024-03-15 NOTE — TELEPHONE ENCOUNTER
I placed an order for testosterone if you would like to test it now or wait for labs 9/24.  Anemia is not typically associated with low testosterone.  However I am happy to test for it.

## 2024-03-16 ENCOUNTER — LAB VISIT (OUTPATIENT)
Dept: LAB | Facility: HOSPITAL | Age: 69
End: 2024-03-16
Attending: FAMILY MEDICINE
Payer: MEDICARE

## 2024-03-16 DIAGNOSIS — R53.83 FATIGUE, UNSPECIFIED TYPE: ICD-10-CM

## 2024-03-16 PROCEDURE — 36415 COLL VENOUS BLD VENIPUNCTURE: CPT | Performed by: FAMILY MEDICINE

## 2024-03-16 PROCEDURE — 84403 ASSAY OF TOTAL TESTOSTERONE: CPT | Performed by: FAMILY MEDICINE

## 2024-03-18 DIAGNOSIS — R79.89 LOW TESTOSTERONE: Primary | ICD-10-CM

## 2024-03-18 DIAGNOSIS — Z12.5 PROSTATE CANCER SCREENING: ICD-10-CM

## 2024-03-18 LAB — TESTOST SERPL-MCNC: 196 NG/DL (ref 264–916)

## 2024-03-19 ENCOUNTER — LAB VISIT (OUTPATIENT)
Dept: LAB | Facility: HOSPITAL | Age: 69
End: 2024-03-19
Attending: FAMILY MEDICINE
Payer: MEDICARE

## 2024-03-19 DIAGNOSIS — R79.89 LOW TESTOSTERONE: ICD-10-CM

## 2024-03-19 LAB
FSH SERPL-ACNC: 35.19 MIU/ML (ref 0.95–11.95)
LH SERPL-ACNC: 9.9 MIU/ML (ref 0.6–12.1)
TESTOST SERPL-MCNC: 372 NG/DL (ref 304–1227)

## 2024-03-19 PROCEDURE — 83002 ASSAY OF GONADOTROPIN (LH): CPT | Performed by: FAMILY MEDICINE

## 2024-03-19 PROCEDURE — 83001 ASSAY OF GONADOTROPIN (FSH): CPT | Performed by: FAMILY MEDICINE

## 2024-03-19 PROCEDURE — 36415 COLL VENOUS BLD VENIPUNCTURE: CPT | Mod: PO | Performed by: FAMILY MEDICINE

## 2024-03-19 PROCEDURE — 84403 ASSAY OF TOTAL TESTOSTERONE: CPT | Performed by: FAMILY MEDICINE

## 2024-03-22 ENCOUNTER — LAB VISIT (OUTPATIENT)
Dept: LAB | Facility: HOSPITAL | Age: 69
End: 2024-03-22
Attending: UROLOGY
Payer: MEDICARE

## 2024-03-22 ENCOUNTER — TELEPHONE (OUTPATIENT)
Dept: FAMILY MEDICINE | Facility: CLINIC | Age: 69
End: 2024-03-22
Payer: MEDICARE

## 2024-03-22 DIAGNOSIS — Z12.5 ENCOUNTER FOR SCREENING FOR MALIGNANT NEOPLASM OF PROSTATE: ICD-10-CM

## 2024-03-22 DIAGNOSIS — N20.0 NEPHROLITHIASIS: ICD-10-CM

## 2024-03-22 LAB — COMPLEXED PSA SERPL-MCNC: 0.71 NG/ML (ref 0–4)

## 2024-03-22 PROCEDURE — 36415 COLL VENOUS BLD VENIPUNCTURE: CPT | Performed by: UROLOGY

## 2024-03-22 PROCEDURE — 84153 ASSAY OF PSA TOTAL: CPT | Performed by: UROLOGY

## 2024-03-22 NOTE — TELEPHONE ENCOUNTER
----- Message from Aleta Hearn sent at 3/22/2024  4:07 PM CDT -----  Regarding: ret call  Contact: Kenny [phone]  Type:  Patient Returning Call    Who Called:  Kenny    Who Left Message for Patient:  clinic    Does the patient know what this is regarding?:  no    Best Call Back Number:  [phone]    Additional Information:  missed call, please call to advise.

## 2024-03-23 ENCOUNTER — PATIENT MESSAGE (OUTPATIENT)
Dept: FAMILY MEDICINE | Facility: CLINIC | Age: 69
End: 2024-03-23
Payer: MEDICARE

## 2024-03-25 ENCOUNTER — LAB VISIT (OUTPATIENT)
Dept: LAB | Facility: HOSPITAL | Age: 69
End: 2024-03-25
Attending: FAMILY MEDICINE
Payer: MEDICARE

## 2024-03-25 ENCOUNTER — TELEPHONE (OUTPATIENT)
Dept: FAMILY MEDICINE | Facility: CLINIC | Age: 69
End: 2024-03-25
Payer: MEDICARE

## 2024-03-25 DIAGNOSIS — R79.89 LOW TESTOSTERONE: Primary | ICD-10-CM

## 2024-03-25 DIAGNOSIS — R79.89 LOW TESTOSTERONE: ICD-10-CM

## 2024-03-25 PROCEDURE — 82040 ASSAY OF SERUM ALBUMIN: CPT | Performed by: FAMILY MEDICINE

## 2024-03-25 PROCEDURE — 84270 ASSAY OF SEX HORMONE GLOBUL: CPT | Performed by: FAMILY MEDICINE

## 2024-03-25 PROCEDURE — 36415 COLL VENOUS BLD VENIPUNCTURE: CPT | Performed by: FAMILY MEDICINE

## 2024-03-25 NOTE — TELEPHONE ENCOUNTER
----- Message from Opal Kleber sent at 3/25/2024  2:51 PM CDT -----  Regarding: Test results  Contact: pt  Type:  Test Results    Who Called: pt    Name of Test (Lab/Mammo/Etc):  blood work    Date of Test:  3/19    Ordering Provider:  henry    Where the test was performed:  [facility]    Would the patient rather a call back or a response via [facility]? Call back    Best Call Back Number: [phone]    Additional Information:  pt is requesting a call back to discuss test results.  Please advise.  Thank you.

## 2024-03-25 NOTE — TELEPHONE ENCOUNTER
Returned call and spoke to patient regarding lab orders to repeat his testosterone levels. Lab orders place by Dr. Anderson nurse Shellie Lombard, LPN

## 2024-04-01 ENCOUNTER — TELEPHONE (OUTPATIENT)
Dept: FAMILY MEDICINE | Facility: CLINIC | Age: 69
End: 2024-04-01
Payer: MEDICARE

## 2024-04-01 NOTE — TELEPHONE ENCOUNTER
----- Message from Kiley Ramachandran sent at 4/1/2024  7:07 AM CDT -----  Contact: Self  Type: Needs Medical Advice    Who Called:  Patient  What is this regarding?:  He had lab work done last week and never saw the results online.  Best Call Back Number:  [phone]  Additional Information:  Please call the patient back at the phone number listed above to advise. Thank you!

## 2024-04-02 DIAGNOSIS — R79.89 ABNORMAL FSH LEVEL: Primary | ICD-10-CM

## 2024-04-02 LAB
ALBUMIN SERPL-MCNC: 4 G/DL (ref 3.6–5.1)
SHBG SERPL-SCNC: 33 NMOL/L (ref 22–77)
TESTOST FREE SERPL-MCNC: 32 PG/ML (ref 46–224)
TESTOST SERPL-MCNC: 249 NG/DL (ref 250–1100)
TESTOSTERONE.FREE+WB SERPL-MCNC: 58.9 NG/DL

## 2024-04-04 ENCOUNTER — PATIENT MESSAGE (OUTPATIENT)
Dept: FAMILY MEDICINE | Facility: CLINIC | Age: 69
End: 2024-04-04
Payer: MEDICARE

## 2024-04-08 ENCOUNTER — OFFICE VISIT (OUTPATIENT)
Dept: ORTHOPEDICS | Facility: CLINIC | Age: 69
End: 2024-04-08
Payer: MEDICARE

## 2024-04-08 VITALS — WEIGHT: 179.25 LBS | HEIGHT: 68 IN | BODY MASS INDEX: 27.17 KG/M2

## 2024-04-08 DIAGNOSIS — M19.132 SCAPHOLUNATE ADVANCED COLLAPSE OF LEFT WRIST: ICD-10-CM

## 2024-04-08 DIAGNOSIS — M19.032 ARTHRITIS OF LEFT WRIST: Primary | ICD-10-CM

## 2024-04-08 DIAGNOSIS — M25.532 LEFT WRIST PAIN: ICD-10-CM

## 2024-04-08 PROCEDURE — 99214 OFFICE O/P EST MOD 30 MIN: CPT | Mod: PBBFAC | Performed by: ORTHOPAEDIC SURGERY

## 2024-04-08 PROCEDURE — 99999 PR PBB SHADOW E&M-EST. PATIENT-LVL IV: CPT | Mod: PBBFAC,,, | Performed by: ORTHOPAEDIC SURGERY

## 2024-04-08 PROCEDURE — 99214 OFFICE O/P EST MOD 30 MIN: CPT | Mod: S$PBB,,, | Performed by: ORTHOPAEDIC SURGERY

## 2024-04-08 RX ORDER — MUPIROCIN 20 MG/G
OINTMENT TOPICAL
Status: CANCELLED | OUTPATIENT
Start: 2024-04-08

## 2024-04-08 RX ORDER — CEFAZOLIN SODIUM 2 G/50ML
2 SOLUTION INTRAVENOUS
Status: CANCELLED | OUTPATIENT
Start: 2024-04-08

## 2024-04-08 NOTE — H&P (VIEW-ONLY)
Attestation signed by Allen Silva MD at 4/8/2024 10:00 AM     I have seen the patient, reviewed the Resident's progress note. I have personally interviewed and examined the patient at bedside and agree with the findings.      Patient with advanced SLAC arthritis left wrist.  He has undergone extensive and a year's worth of conservative treatment including multiple corticosteroid injections with only limited and temporary relief.  He presents today noting that his pain is located most focally at a palpable osteophyte coming from the scaphoid as seen on his imaging.  This is at the extreme radial aspect of his wrist.  He has no generalized or more central wrist pain with range of motion or palpation.  We discussed options today.  At this point in time it is his scaphoid osteophyte that is most symptomatic but he certainly does have significant and severe underlying SLAC arthritis.  He would like to proceed with a partial scaphoid excision as well as a radial styloidectomy on April 19, 2024 which I feel is reasonable.  He does understand that this will not address all the arthritis in his wrist and that additional surgery may be necessary in the future should that become symptomatic.  He would like to proceed as planned.       The patient has not responded to adequate non operative treatment at this time and/or non operative treatment is not indicated. Thus, the risks, benefits and alternatives to surgery were discussed with the patient in detail.  Specific risks include but are not limited to bleeding, infection, vessel and/or nerve damage, pain, numbness, tingling, compartment syndrome, need for additional surgery, failure to return to pre-injury and/or preoperative functional status, inability to return to work, scar sensitivity, delayed healing, complex regional pain syndrome, weakness, pulley injury, tendon injury, bowstringing, partial and/or incomplete relief of symptoms, persistence of and/or worsening of  symptoms, hardware and/or surgical failure, prominent and/or symptomatic hardware possibly necessitating future removal, osteomyelitis, amputation, loss of function, stiffness, rotational malalignment, functional debility, dysfunction, decreased  strength, need for prolonged postoperative rehabilitation, malunion, nonunion, deep venous thrombosis, pulmonary embolism, arthritis and death.  The patient states an understanding and wishes to proceed with surgery.   All questions were answered.  No guarantees were implied or stated.  Written informed consent was obtained.        Allen Silva MD  Hand Surgery  [facility]            Expand All Collapse All    Hand and Upper Extremity Center  History & Physical  Orthopedics     SUBJECTIVE:       COVID-19 attestation:  This patient was treated during the COVID-19 pandemic.  This was discussed with the patient, they are aware of our current policies and procedures, were given the option of delaying their visit and or switching to a virtual visit, delaying their surgery when applicable, and they elect to proceed.     Chief Complaint: L wrist pain     Referring Provider: Jordana Anderson MD      History of Present Illness:  Patient is a 68 y.o. left hand dominant male who presents today with complaints of left sided wrist pain. He's been seen in hand clinic before for this pain, and he last received an injection in the left wrist back in September of 2023, which he states gave him 3-4 months of relief. He is interested in surgical options for his wrist at this time.     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     Symptoms are aggravated by activity and movement.     Symptoms are alleviated by rest.     Symptoms consist of pain and decreased ROM.     The patient rates their pain as a 5/10.     Attempted treatment(s) and/or interventions include activity modifications, rest, rest and steroid injection.     The patient  denies any fevers, chills, N/V, D/C and presents for evaluation.             Past Medical History:   Diagnosis Date    Anemia      Anticoagulant long-term use      Chronic kidney disease      Chronic rhinitis      Disc       lower back    DJD (degenerative joint disease)      Foot pain       left foot    Gout, unspecified      HBP (high blood pressure)      High cholesterol      Kidney stone      Sleep apnea      Weight loss       15 pounds in two months            Past Surgical History:   Procedure Laterality Date    APPENDECTOMY        COLONOSCOPY N/A 11/11/2021     Procedure: COLONOSCOPY;  Surgeon: Christian Santiago MD;  Location: [facility];  Service: Endoscopy;  Laterality: N/A;    ELBOW SURGERY        VASECTOMY               Review of patient's allergies indicates:   Allergen Reactions    No known drug allergies        Social History          Social History Narrative    Not on file            Family History   Problem Relation Age of Onset    Cancer Mother      Heart disease Mother      Heart failure Mother      Liver disease Father           secondary to alcohol    Diabetes Maternal Grandmother      Allergies Son      Asthma Son      Urolithiasis Neg Hx      Prostate cancer Neg Hx      Kidney cancer Neg Hx      Eczema Neg Hx      Immunodeficiency Neg Hx      Angioedema Neg Hx      Allergic rhinitis Neg Hx      Atopy Neg Hx      Rhinitis Neg Hx      Urticaria Neg Hx              Current Outpatient Medications:     aspirin (ECOTRIN) 81 MG EC tablet, Take by mouth. 1 Tablet, Delayed Release (E.C.) Oral Every day, Disp: , Rfl:     azelastine (ASTELIN) 137 mcg (0.1 %) nasal spray, SPRAY 1 SPRAY IN EACH NOSTRIL 2 TIMES A DAY, Disp: 90 mL, Rfl: 3    citalopram (CELEXA) 20 MG tablet, Take 1 tablet (20 mg total) by mouth once daily., Disp: 90 tablet, Rfl: 3    fluticasone propionate (FLONASE) 50 mcg/actuation nasal spray, 1 SPRAY (50 MCG TOTAL) BY EACH NOSTRIL ROUTE ONCE DAILY., Disp: 48 mL, Rfl: PRN    meloxicam  "(MOBIC) 7.5 MG tablet, Take 7.5 mg by mouth once daily., Disp: , Rfl:     mometasone 0.1% (ELOCON) 0.1 % cream, Apply topically 2 (two) times daily as needed., Disp: 15 g, Rfl: 3    potassium citrate (UROCIT-K 15) 15 mEq TbSR, Take 15 mEq by mouth 2 (two) times a day. Take twice daily for stone prevention. Will see in still, Disp: 180 tablet, Rfl: 3    tamsulosin (FLOMAX) 0.4 mg Cap, Take 2 capsules (0.8 mg total) by mouth once daily. Take 1 nightly to help pass stone, Disp: 180 capsule, Rfl: 3    traZODone (DESYREL) 50 MG tablet, Take 50 mg by mouth., Disp: , Rfl:         Review of Systems:  As per HPI otherwise noncontributory     OBJECTIVE:       Vital Signs (Most Recent):  Vitals       Vitals:     04/08/24 0914   Weight: 81.3 kg (179 lb 3.7 oz)   Height: 5' 8" (1.727 m)         Body mass index is 27.25 kg/m².        Physical Exam:  Constitutional: The patient appears well-developed and well-nourished. No distress.   Skin: No lesions appreciated  Head: Normocephalic and atraumatic.   Nose: Nose normal.   Ears: No deformities seen  Eyes: Conjunctivae and EOM are normal.   Neck: No tracheal deviation present.   Cardiovascular: Normal rate and intact distal pulses.    Pulmonary/Chest: Effort normal. No respiratory distress.   Abdominal: There is no guarding.   Neurological: The patient is alert.   Psychiatric: The patient has a normal mood and affect.      Left Hand/Wrist Examination:     Observation/Inspection:  Swelling                       none                  Deformity                     Hard protuberance over the radial aspect of the anatomic snuff box  Discoloration               none                  Scars                           none                  Atrophy                        none  TTP over the scaphoid osteophyte  Nontender to palpation over the scapholunate interval     HAND/WRIST EXAMINATION:  Finkelstein's Test                                Neg  WHAT Test                                        "  Neg  Snuff box tenderness                          Neg  Cervantes's Test                                     Neg  Hook of Hamate Tenderness              Neg  CMC grind                                           Neg  Circumduction test                              Neg     Neurovascular Exam:  Digits WWP, brisk CR < 3s throughout  NVI motor/LTS to M/R/U nerves, radial pulse 2+  Tinel's Test - Carpal Tunnel                Neg  Tinel's Test - Cubital Tunnel               Neg  Phalen's Test                                      Neg  Median Nerve Compression Test       Neg     ROM hand full, painless     ROM wrist limited due to degenerative changes, decreased flexion, extension, radial and ulnar deviation    ROM elbow full, painless     Abdomen not guarded  Respirations nonlabored  Perfusion intact     Diagnostic Results:     Imaging - I independently viewed the patient's imaging as well as the radiology report.  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     ASSESSMENT/PLAN:       68 y.o. 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      Plan: The patient and I had a thorough discussion today.  We discussed the working diagnosis as well as several other potential alternative diagnoses.  Treatment options were discussed, both conservative and surgical.  Conservative treatment options would include things such as activity modifications, workplace modifications, a period of rest, oral vs topical OTC and prescription anti-inflammatory medications, occupational therapy, splinting/bracing, immobilization, corticosteroid injections, and others.  Surgical options were discussed as well.      At this time, the patient would like to proceed with a scaphoid osteophyte excision and radial styloid excision this Friday.     Should the patient's symptoms worsen, persist, or fail to improve they should return for reevaluation and I would be happy to see  them back anytime.          Allen Silva M.D.     Please be aware that this note has been generated with the assistance of [employer] voice-to-text.  Please excuse any spelling or grammatical errors.     Thank you for choosing Dr. Allen Silva for your orthopedic hand and upper extremity care. It is our goal to provide you with exceptional care that will help keep you healthy, active, and get you back in the game.     If you felt that you received exemplary care today, please consider leaving feedback for Dr. Silva on [employer] at [email or web address].     Please do not hesitate to reach out to us via email, phone, or MyChart with any questions, concerns, or feedback.

## 2024-04-08 NOTE — H&P
Attestation signed by Allen Silva MD at 4/8/2024 10:00 AM     I have seen the patient, reviewed the Resident's progress note. I have personally interviewed and examined the patient at bedside and agree with the findings.      Patient with advanced SLAC arthritis left wrist.  He has undergone extensive and a year's worth of conservative treatment including multiple corticosteroid injections with only limited and temporary relief.  He presents today noting that his pain is located most focally at a palpable osteophyte coming from the scaphoid as seen on his imaging.  This is at the extreme radial aspect of his wrist.  He has no generalized or more central wrist pain with range of motion or palpation.  We discussed options today.  At this point in time it is his scaphoid osteophyte that is most symptomatic but he certainly does have significant and severe underlying SLAC arthritis.  He would like to proceed with a partial scaphoid excision as well as a radial styloidectomy on April 19, 2024 which I feel is reasonable.  He does understand that this will not address all the arthritis in his wrist and that additional surgery may be necessary in the future should that become symptomatic.  He would like to proceed as planned.       The patient has not responded to adequate non operative treatment at this time and/or non operative treatment is not indicated. Thus, the risks, benefits and alternatives to surgery were discussed with the patient in detail.  Specific risks include but are not limited to bleeding, infection, vessel and/or nerve damage, pain, numbness, tingling, compartment syndrome, need for additional surgery, failure to return to pre-injury and/or preoperative functional status, inability to return to work, scar sensitivity, delayed healing, complex regional pain syndrome, weakness, pulley injury, tendon injury, bowstringing, partial and/or incomplete relief of symptoms, persistence of and/or worsening of  symptoms, hardware and/or surgical failure, prominent and/or symptomatic hardware possibly necessitating future removal, osteomyelitis, amputation, loss of function, stiffness, rotational malalignment, functional debility, dysfunction, decreased  strength, need for prolonged postoperative rehabilitation, malunion, nonunion, deep venous thrombosis, pulmonary embolism, arthritis and death.  The patient states an understanding and wishes to proceed with surgery.   All questions were answered.  No guarantees were implied or stated.  Written informed consent was obtained.        Allen Silva MD  Hand Surgery  [facility]            Expand All Collapse All    Hand and Upper Extremity Center  History & Physical  Orthopedics     SUBJECTIVE:       COVID-19 attestation:  This patient was treated during the COVID-19 pandemic.  This was discussed with the patient, they are aware of our current policies and procedures, were given the option of delaying their visit and or switching to a virtual visit, delaying their surgery when applicable, and they elect to proceed.     Chief Complaint: L wrist pain     Referring Provider: Jordana Anderson MD      History of Present Illness:  Patient is a 68 y.o. left hand dominant male who presents today with complaints of left sided wrist pain. He's been seen in hand clinic before for this pain, and he last received an injection in the left wrist back in September of 2023, which he states gave him 3-4 months of relief. He is interested in surgical options for his wrist at this time.     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     Symptoms are aggravated by activity and movement.     Symptoms are alleviated by rest.     Symptoms consist of pain and decreased ROM.     The patient rates their pain as a 5/10.     Attempted treatment(s) and/or interventions include activity modifications, rest, rest and steroid injection.     The patient  denies any fevers, chills, N/V, D/C and presents for evaluation.             Past Medical History:   Diagnosis Date    Anemia      Anticoagulant long-term use      Chronic kidney disease      Chronic rhinitis      Disc       lower back    DJD (degenerative joint disease)      Foot pain       left foot    Gout, unspecified      HBP (high blood pressure)      High cholesterol      Kidney stone      Sleep apnea      Weight loss       15 pounds in two months            Past Surgical History:   Procedure Laterality Date    APPENDECTOMY        COLONOSCOPY N/A 11/11/2021     Procedure: COLONOSCOPY;  Surgeon: Christian Santiago MD;  Location: [facility];  Service: Endoscopy;  Laterality: N/A;    ELBOW SURGERY        VASECTOMY               Review of patient's allergies indicates:   Allergen Reactions    No known drug allergies        Social History          Social History Narrative    Not on file            Family History   Problem Relation Age of Onset    Cancer Mother      Heart disease Mother      Heart failure Mother      Liver disease Father           secondary to alcohol    Diabetes Maternal Grandmother      Allergies Son      Asthma Son      Urolithiasis Neg Hx      Prostate cancer Neg Hx      Kidney cancer Neg Hx      Eczema Neg Hx      Immunodeficiency Neg Hx      Angioedema Neg Hx      Allergic rhinitis Neg Hx      Atopy Neg Hx      Rhinitis Neg Hx      Urticaria Neg Hx              Current Outpatient Medications:     aspirin (ECOTRIN) 81 MG EC tablet, Take by mouth. 1 Tablet, Delayed Release (E.C.) Oral Every day, Disp: , Rfl:     azelastine (ASTELIN) 137 mcg (0.1 %) nasal spray, SPRAY 1 SPRAY IN EACH NOSTRIL 2 TIMES A DAY, Disp: 90 mL, Rfl: 3    citalopram (CELEXA) 20 MG tablet, Take 1 tablet (20 mg total) by mouth once daily., Disp: 90 tablet, Rfl: 3    fluticasone propionate (FLONASE) 50 mcg/actuation nasal spray, 1 SPRAY (50 MCG TOTAL) BY EACH NOSTRIL ROUTE ONCE DAILY., Disp: 48 mL, Rfl: PRN    meloxicam  "(MOBIC) 7.5 MG tablet, Take 7.5 mg by mouth once daily., Disp: , Rfl:     mometasone 0.1% (ELOCON) 0.1 % cream, Apply topically 2 (two) times daily as needed., Disp: 15 g, Rfl: 3    potassium citrate (UROCIT-K 15) 15 mEq TbSR, Take 15 mEq by mouth 2 (two) times a day. Take twice daily for stone prevention. Will see in still, Disp: 180 tablet, Rfl: 3    tamsulosin (FLOMAX) 0.4 mg Cap, Take 2 capsules (0.8 mg total) by mouth once daily. Take 1 nightly to help pass stone, Disp: 180 capsule, Rfl: 3    traZODone (DESYREL) 50 MG tablet, Take 50 mg by mouth., Disp: , Rfl:         Review of Systems:  As per HPI otherwise noncontributory     OBJECTIVE:       Vital Signs (Most Recent):  Vitals       Vitals:     04/08/24 0914   Weight: 81.3 kg (179 lb 3.7 oz)   Height: 5' 8" (1.727 m)         Body mass index is 27.25 kg/m².        Physical Exam:  Constitutional: The patient appears well-developed and well-nourished. No distress.   Skin: No lesions appreciated  Head: Normocephalic and atraumatic.   Nose: Nose normal.   Ears: No deformities seen  Eyes: Conjunctivae and EOM are normal.   Neck: No tracheal deviation present.   Cardiovascular: Normal rate and intact distal pulses.    Pulmonary/Chest: Effort normal. No respiratory distress.   Abdominal: There is no guarding.   Neurological: The patient is alert.   Psychiatric: The patient has a normal mood and affect.      Left Hand/Wrist Examination:     Observation/Inspection:  Swelling                       none                  Deformity                     Hard protuberance over the radial aspect of the anatomic snuff box  Discoloration               none                  Scars                           none                  Atrophy                        none  TTP over the scaphoid osteophyte  Nontender to palpation over the scapholunate interval     HAND/WRIST EXAMINATION:  Finkelstein's Test                                Neg  WHAT Test                                        "  Neg  Snuff box tenderness                          Neg  Cervantes's Test                                     Neg  Hook of Hamate Tenderness              Neg  CMC grind                                           Neg  Circumduction test                              Neg     Neurovascular Exam:  Digits WWP, brisk CR < 3s throughout  NVI motor/LTS to M/R/U nerves, radial pulse 2+  Tinel's Test - Carpal Tunnel                Neg  Tinel's Test - Cubital Tunnel               Neg  Phalen's Test                                      Neg  Median Nerve Compression Test       Neg     ROM hand full, painless     ROM wrist limited due to degenerative changes, decreased flexion, extension, radial and ulnar deviation    ROM elbow full, painless     Abdomen not guarded  Respirations nonlabored  Perfusion intact     Diagnostic Results:     Imaging - I independently viewed the patient's imaging as well as the radiology report.  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     ASSESSMENT/PLAN:       68 y.o. 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      Plan: The patient and I had a thorough discussion today.  We discussed the working diagnosis as well as several other potential alternative diagnoses.  Treatment options were discussed, both conservative and surgical.  Conservative treatment options would include things such as activity modifications, workplace modifications, a period of rest, oral vs topical OTC and prescription anti-inflammatory medications, occupational therapy, splinting/bracing, immobilization, corticosteroid injections, and others.  Surgical options were discussed as well.      At this time, the patient would like to proceed with a scaphoid osteophyte excision and radial styloid excision this Friday.     Should the patient's symptoms worsen, persist, or fail to improve they should return for reevaluation and I would be happy to see  them back anytime.          Allen Silva M.D.     Please be aware that this note has been generated with the assistance of [employer] voice-to-text.  Please excuse any spelling or grammatical errors.     Thank you for choosing Dr. Allen Silva for your orthopedic hand and upper extremity care. It is our goal to provide you with exceptional care that will help keep you healthy, active, and get you back in the game.     If you felt that you received exemplary care today, please consider leaving feedback for Dr. Silva on [employer] at [email or web address].     Please do not hesitate to reach out to us via email, phone, or MyChart with any questions, concerns, or feedback.

## 2024-04-08 NOTE — PROGRESS NOTES
[facility]  History & Physical  Orthopedics    SUBJECTIVE:      COVID-19 attestation:  This patient was treated during the COVID-19 pandemic.  This was discussed with the patient, they are aware of our current policies and procedures, were given the option of delaying their visit and or switching to a virtual visit, delaying their surgery when applicable, and they elect to proceed.    Chief Complaint: L wrist pain    Referring Provider: Jordana Anderson MD     History of Present Illness:  Patient is a 68 y.o. left hand dominant male who presents today with complaints of left sided wrist pain. He's been seen in hand clinic before for this pain, and he last received an injection in the left wrist back in September of 2023, which he states gave him 3-4 months of relief. He is interested in surgical options for his wrist at this time.    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    Symptoms are aggravated by activity and movement.    Symptoms are alleviated by rest.    Symptoms consist of pain and decreased ROM.    The patient rates their pain as a 5/10.    Attempted treatment(s) and/or interventions include activity modifications, rest, rest and steroid injection.     The patient denies any fevers, chills, N/V, D/C and presents for evaluation.       Past Medical History:   Diagnosis Date    Anemia     Anticoagulant long-term use     Chronic kidney disease     Chronic rhinitis     Disc     lower back    DJD (degenerative joint disease)     Foot pain     left foot    Gout, unspecified     HBP (high blood pressure)     High cholesterol     Kidney stone     Sleep apnea     Weight loss     15 pounds in two months     Past Surgical History:   Procedure Laterality Date    APPENDECTOMY      COLONOSCOPY N/A 11/11/2021    Procedure: COLONOSCOPY;  Surgeon: Christian Santiago MD;  Location: [facility];  Service: Endoscopy;  Laterality: N/A;    ELBOW SURGERY       "VASECTOMY       Review of patient's allergies indicates:   Allergen Reactions    No known drug allergies      Social History     Social History Narrative    Not on file     Family History   Problem Relation Age of Onset    Cancer Mother     Heart disease Mother     Heart failure Mother     Liver disease Father         secondary to alcohol    Diabetes Maternal Grandmother     Allergies Son     Asthma Son     Urolithiasis Neg Hx     Prostate cancer Neg Hx     Kidney cancer Neg Hx     Eczema Neg Hx     Immunodeficiency Neg Hx     Angioedema Neg Hx     Allergic rhinitis Neg Hx     Atopy Neg Hx     Rhinitis Neg Hx     Urticaria Neg Hx          Current Outpatient Medications:     aspirin (ECOTRIN) 81 MG EC tablet, Take by mouth. 1 Tablet, Delayed Release (E.C.) Oral Every day, Disp: , Rfl:     azelastine (ASTELIN) 137 mcg (0.1 %) nasal spray, SPRAY 1 SPRAY IN EACH NOSTRIL 2 TIMES A DAY, Disp: 90 mL, Rfl: 3    citalopram (CELEXA) 20 MG tablet, Take 1 tablet (20 mg total) by mouth once daily., Disp: 90 tablet, Rfl: 3    fluticasone propionate (FLONASE) 50 mcg/actuation nasal spray, 1 SPRAY (50 MCG TOTAL) BY EACH NOSTRIL ROUTE ONCE DAILY., Disp: 48 mL, Rfl: PRN    meloxicam (MOBIC) 7.5 MG tablet, Take 7.5 mg by mouth once daily., Disp: , Rfl:     mometasone 0.1% (ELOCON) 0.1 % cream, Apply topically 2 (two) times daily as needed., Disp: 15 g, Rfl: 3    potassium citrate (UROCIT-K 15) 15 mEq TbSR, Take 15 mEq by mouth 2 (two) times a day. Take twice daily for stone prevention. Will see in still, Disp: 180 tablet, Rfl: 3    tamsulosin (FLOMAX) 0.4 mg Cap, Take 2 capsules (0.8 mg total) by mouth once daily. Take 1 nightly to help pass stone, Disp: 180 capsule, Rfl: 3    traZODone (DESYREL) 50 MG tablet, Take 50 mg by mouth., Disp: , Rfl:       Review of Systems:  As per HPI otherwise noncontributory    OBJECTIVE:      Vital Signs (Most Recent):  Vitals:    04/08/24 0914   Weight: 81.3 kg (179 lb 3.7 oz)   Height: 5' 8" (1.727 " m)     Body mass index is 27.25 kg/m².      Physical Exam:  Constitutional: The patient appears well-developed and well-nourished. No distress.   Skin: No lesions appreciated  Head: Normocephalic and atraumatic.   Nose: Nose normal.   Ears: No deformities seen  Eyes: Conjunctivae and EOM are normal.   Neck: No tracheal deviation present.   Cardiovascular: Normal rate and intact distal pulses.    Pulmonary/Chest: Effort normal. No respiratory distress.   Abdominal: There is no guarding.   Neurological: The patient is alert.   Psychiatric: The patient has a normal mood and affect.     Left Hand/Wrist Examination:    Observation/Inspection:  Swelling  none    Deformity  Hard protuberance over the radial aspect of the anatomic snuff box  Discoloration  none     Scars   none    Atrophy  none  TTP over the scaphoid osteophyte  Nontender to palpation over the scapholunate interval    HAND/WRIST EXAMINATION:  Finkelstein's Test   Neg  WHAT Test    Neg  Snuff box tenderness   Neg  Cervantes's Test    Neg  Hook of Hamate Tenderness  Neg  CMC grind    Neg  Circumduction test   Neg    Neurovascular Exam:  Digits WWP, brisk CR < 3s throughout  NVI motor/LTS to M/R/U nerves, radial pulse 2+  Tinel's Test - Carpal Tunnel  Neg  Tinel's Test - Cubital Tunnel  Neg  Phalen's Test    Neg  Median Nerve Compression Test Neg    ROM hand full, painless    ROM wrist limited due to degenerative changes, decreased flexion, extension, radial and ulnar deviation    ROM elbow full, painless    Abdomen not guarded  Respirations nonlabored  Perfusion intact    Diagnostic Results:     Imaging - I independently viewed the patient's imaging as well as the radiology report.  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    ASSESSMENT/PLAN:      68 y.o. 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     Plan: The patient and I had a thorough discussion today.  We discussed the  working diagnosis as well as several other potential alternative diagnoses.  Treatment options were discussed, both conservative and surgical.  Conservative treatment options would include things such as activity modifications, workplace modifications, a period of rest, oral vs topical OTC and prescription anti-inflammatory medications, occupational therapy, splinting/bracing, immobilization, corticosteroid injections, and others.  Surgical options were discussed as well.     At this time, the patient would like to proceed with a scaphoid osteophyte excision and radial styloid excision this Friday.    Should the patient's symptoms worsen, persist, or fail to improve they should return for reevaluation and I would be happy to see them back anytime.        Allen Silva M.D.    Please be aware that this note has been generated with the assistance of [employer] voice-to-text.  Please excuse any spelling or grammatical errors.    Thank you for choosing Dr. Allen Silva for your orthopedic hand and upper extremity care. It is our goal to provide you with exceptional care that will help keep you healthy, active, and get you back in the game.     If you felt that you received exemplary care today, please consider leaving feedback for Dr. Silva on [employer] at [email or web address].    Please do not hesitate to reach out to us via email, phone, or MyChart with any questions, concerns, or feedback.

## 2024-04-12 ENCOUNTER — ANESTHESIA EVENT (OUTPATIENT)
Dept: SURGERY | Facility: HOSPITAL | Age: 69
End: 2024-04-12
Payer: MEDICARE

## 2024-04-18 ENCOUNTER — PATIENT MESSAGE (OUTPATIENT)
Dept: ORTHOPEDICS | Facility: CLINIC | Age: 69
End: 2024-04-18
Payer: MEDICARE

## 2024-04-18 RX ORDER — OXYCODONE AND ACETAMINOPHEN 5; 325 MG/1; MG/1
1 TABLET ORAL EVERY 4 HOURS PRN
Qty: 10 TABLET | Refills: 0 | Status: SHIPPED | OUTPATIENT
Start: 2024-04-18

## 2024-04-18 RX ORDER — ACETAMINOPHEN 500 MG
1000 TABLET ORAL 2 TIMES DAILY
Qty: 20 TABLET | Refills: 0 | Status: SHIPPED | OUTPATIENT
Start: 2024-04-18

## 2024-04-18 RX ORDER — IBUPROFEN 600 MG/1
600 TABLET ORAL 3 TIMES DAILY
Qty: 20 TABLET | Refills: 0 | Status: SHIPPED | OUTPATIENT
Start: 2024-04-18

## 2024-04-19 ENCOUNTER — HOSPITAL ENCOUNTER (OUTPATIENT)
Facility: HOSPITAL | Age: 69
Discharge: HOME | End: 2024-04-19
Attending: ORTHOPAEDIC SURGERY | Admitting: ORTHOPAEDIC SURGERY
Payer: MEDICARE

## 2024-04-19 ENCOUNTER — ANESTHESIA (OUTPATIENT)
Dept: SURGERY | Facility: HOSPITAL | Age: 69
End: 2024-04-19
Payer: MEDICARE

## 2024-04-19 VITALS
OXYGEN SATURATION: 98 % | TEMPERATURE: 98 F | SYSTOLIC BLOOD PRESSURE: 130 MMHG | RESPIRATION RATE: 14 BRPM | WEIGHT: 179 LBS | DIASTOLIC BLOOD PRESSURE: 77 MMHG | HEIGHT: 68 IN | BODY MASS INDEX: 27.13 KG/M2 | HEART RATE: 47 BPM

## 2024-04-19 DIAGNOSIS — M19.032 ARTHRITIS OF LEFT WRIST: ICD-10-CM

## 2024-04-19 DIAGNOSIS — M25.532 LEFT WRIST PAIN: ICD-10-CM

## 2024-04-19 DIAGNOSIS — M19.132 SCAPHOLUNATE ADVANCED COLLAPSE OF LEFT WRIST: Primary | ICD-10-CM

## 2024-04-19 PROCEDURE — 36000709 HC OR TIME LEV III EA ADD 15 MIN: Performed by: ORTHOPAEDIC SURGERY

## 2024-04-19 PROCEDURE — 63600175 PHARM REV CODE 636 W HCPCS: Performed by: NURSE ANESTHETIST, CERTIFIED REGISTERED

## 2024-04-19 PROCEDURE — 94761 N-INVAS EAR/PLS OXIMETRY MLT: CPT

## 2024-04-19 PROCEDURE — 88311 DECALCIFY TISSUE: CPT | Mod: 26,,, | Performed by: STUDENT IN AN ORGANIZED HEALTH CARE EDUCATION/TRAINING PROGRAM

## 2024-04-19 PROCEDURE — 63600175 PHARM REV CODE 636 W HCPCS: Performed by: STUDENT IN AN ORGANIZED HEALTH CARE EDUCATION/TRAINING PROGRAM

## 2024-04-19 PROCEDURE — 71000033 HC RECOVERY, INTIAL HOUR: Performed by: ORTHOPAEDIC SURGERY

## 2024-04-19 PROCEDURE — 88307 TISSUE EXAM BY PATHOLOGIST: CPT | Mod: 26,,, | Performed by: STUDENT IN AN ORGANIZED HEALTH CARE EDUCATION/TRAINING PROGRAM

## 2024-04-19 PROCEDURE — 64415 NJX AA&/STRD BRCH PLXS IMG: CPT | Mod: 59,LT | Performed by: STUDENT IN AN ORGANIZED HEALTH CARE EDUCATION/TRAINING PROGRAM

## 2024-04-19 PROCEDURE — D9220A PRA ANESTHESIA: Mod: CRNA,,, | Performed by: NURSE ANESTHETIST, CERTIFIED REGISTERED

## 2024-04-19 PROCEDURE — 25210 REMOVAL OF WRIST BONE: CPT | Mod: LT,,, | Performed by: ORTHOPAEDIC SURGERY

## 2024-04-19 PROCEDURE — D9220A PRA ANESTHESIA: Mod: ANES,,, | Performed by: STUDENT IN AN ORGANIZED HEALTH CARE EDUCATION/TRAINING PROGRAM

## 2024-04-19 PROCEDURE — 25000003 PHARM REV CODE 250: Performed by: ORTHOPAEDIC SURGERY

## 2024-04-19 PROCEDURE — 63600175 PHARM REV CODE 636 W HCPCS: Performed by: ORTHOPAEDIC SURGERY

## 2024-04-19 PROCEDURE — 37000009 HC ANESTHESIA EA ADD 15 MINS: Performed by: ORTHOPAEDIC SURGERY

## 2024-04-19 PROCEDURE — 99900035 HC TECH TIME PER 15 MIN (STAT)

## 2024-04-19 PROCEDURE — 88311 DECALCIFY TISSUE: CPT | Performed by: STUDENT IN AN ORGANIZED HEALTH CARE EDUCATION/TRAINING PROGRAM

## 2024-04-19 PROCEDURE — 71000015 HC POSTOP RECOV 1ST HR: Performed by: ORTHOPAEDIC SURGERY

## 2024-04-19 PROCEDURE — 37000008 HC ANESTHESIA 1ST 15 MINUTES: Performed by: ORTHOPAEDIC SURGERY

## 2024-04-19 PROCEDURE — 88307 TISSUE EXAM BY PATHOLOGIST: CPT | Performed by: STUDENT IN AN ORGANIZED HEALTH CARE EDUCATION/TRAINING PROGRAM

## 2024-04-19 PROCEDURE — 27201423 OPTIME MED/SURG SUP & DEVICES STERILE SUPPLY: Performed by: ORTHOPAEDIC SURGERY

## 2024-04-19 PROCEDURE — 25000003 PHARM REV CODE 250: Performed by: NURSE ANESTHETIST, CERTIFIED REGISTERED

## 2024-04-19 PROCEDURE — 63600175 PHARM REV CODE 636 W HCPCS: Performed by: PHYSICIAN ASSISTANT

## 2024-04-19 PROCEDURE — 25000003 PHARM REV CODE 250: Performed by: PHYSICIAN ASSISTANT

## 2024-04-19 PROCEDURE — 36000708 HC OR TIME LEV III 1ST 15 MIN: Performed by: ORTHOPAEDIC SURGERY

## 2024-04-19 RX ORDER — ACETAMINOPHEN 500 MG
1000 TABLET ORAL
Status: COMPLETED | OUTPATIENT
Start: 2024-04-19 | End: 2024-04-19

## 2024-04-19 RX ORDER — DEXAMETHASONE SODIUM PHOSPHATE 4 MG/ML
INJECTION, SOLUTION INTRA-ARTICULAR; INTRALESIONAL; INTRAMUSCULAR; INTRAVENOUS; SOFT TISSUE
Status: DISCONTINUED | OUTPATIENT
Start: 2024-04-19 | End: 2024-04-22

## 2024-04-19 RX ORDER — ONDANSETRON HYDROCHLORIDE 2 MG/ML
INJECTION, SOLUTION INTRAVENOUS
Status: DISCONTINUED | OUTPATIENT
Start: 2024-04-19 | End: 2024-04-22

## 2024-04-19 RX ORDER — ONDANSETRON HYDROCHLORIDE 2 MG/ML
4 INJECTION, SOLUTION INTRAVENOUS DAILY PRN
Status: DISCONTINUED | OUTPATIENT
Start: 2024-04-19 | End: 2024-04-19 | Stop reason: HOSPADM

## 2024-04-19 RX ORDER — MIDAZOLAM HYDROCHLORIDE 1 MG/ML
1 INJECTION, SOLUTION INTRAMUSCULAR; INTRAVENOUS
Status: DISPENSED | OUTPATIENT
Start: 2024-04-19

## 2024-04-19 RX ORDER — MUPIROCIN 20 MG/G
OINTMENT TOPICAL
Status: DISCONTINUED | OUTPATIENT
Start: 2024-04-19 | End: 2024-04-19 | Stop reason: HOSPADM

## 2024-04-19 RX ORDER — ROPIVACAINE HYDROCHLORIDE 5 MG/ML
INJECTION, SOLUTION EPIDURAL; INFILTRATION; PERINEURAL
Status: COMPLETED | OUTPATIENT
Start: 2024-04-19 | End: 2024-04-19

## 2024-04-19 RX ORDER — OXYCODONE HYDROCHLORIDE 5 MG/1
5 TABLET ORAL
Status: DISCONTINUED | OUTPATIENT
Start: 2024-04-19 | End: 2024-04-19 | Stop reason: HOSPADM

## 2024-04-19 RX ORDER — FAMOTIDINE 10 MG/ML
INJECTION INTRAVENOUS
Status: DISCONTINUED | OUTPATIENT
Start: 2024-04-19 | End: 2024-04-22

## 2024-04-19 RX ORDER — FENTANYL CITRATE 50 UG/ML
25 INJECTION, SOLUTION INTRAMUSCULAR; INTRAVENOUS EVERY 5 MIN PRN
Status: DISCONTINUED | OUTPATIENT
Start: 2024-04-19 | End: 2024-04-19 | Stop reason: HOSPADM

## 2024-04-19 RX ORDER — MULTIVITAMIN
1 TABLET ORAL DAILY
COMMUNITY

## 2024-04-19 RX ORDER — FENTANYL CITRATE 50 UG/ML
100 INJECTION, SOLUTION INTRAMUSCULAR; INTRAVENOUS
Status: DISPENSED | OUTPATIENT
Start: 2024-04-19

## 2024-04-19 RX ORDER — PROPOFOL 10 MG/ML
VIAL (ML) INTRAVENOUS CONTINUOUS PRN
Status: DISCONTINUED | OUTPATIENT
Start: 2024-04-19 | End: 2024-04-22

## 2024-04-19 RX ORDER — CELECOXIB 200 MG/1
400 CAPSULE ORAL
Status: COMPLETED | OUTPATIENT
Start: 2024-04-19 | End: 2024-04-19

## 2024-04-19 RX ORDER — LIDOCAINE HYDROCHLORIDE 20 MG/ML
INJECTION INTRAVENOUS
Status: DISCONTINUED | OUTPATIENT
Start: 2024-04-19 | End: 2024-04-22

## 2024-04-19 RX ADMIN — LIDOCAINE HYDROCHLORIDE 75 MG: 20 INJECTION INTRAVENOUS at 11:04

## 2024-04-19 RX ADMIN — PROPOFOL 125 MCG/KG/MIN: 10 INJECTION, EMULSION INTRAVENOUS at 11:04

## 2024-04-19 RX ADMIN — CEFAZOLIN 2 G: 2 INJECTION, POWDER, FOR SOLUTION INTRAMUSCULAR; INTRAVENOUS at 11:04

## 2024-04-19 RX ADMIN — FAMOTIDINE 20 MG: 10 INJECTION, SOLUTION INTRAVENOUS at 11:04

## 2024-04-19 RX ADMIN — FENTANYL CITRATE 100 MCG: 50 INJECTION, SOLUTION INTRAMUSCULAR; INTRAVENOUS at 09:04

## 2024-04-19 RX ADMIN — SODIUM CHLORIDE: 0.9 INJECTION, SOLUTION INTRAVENOUS at 09:04

## 2024-04-19 RX ADMIN — ACETAMINOPHEN 1000 MG: 500 TABLET ORAL at 09:04

## 2024-04-19 RX ADMIN — MIDAZOLAM 2 MG: 1 INJECTION INTRAMUSCULAR; INTRAVENOUS at 09:04

## 2024-04-19 RX ADMIN — CELECOXIB 400 MG: 200 CAPSULE ORAL at 09:04

## 2024-04-19 RX ADMIN — ONDANSETRON 4 MG: 2 INJECTION INTRAMUSCULAR; INTRAVENOUS at 11:04

## 2024-04-19 RX ADMIN — ROPIVACAINE HYDROCHLORIDE 30 ML: 5 INJECTION EPIDURAL; INFILTRATION; PERINEURAL at 10:04

## 2024-04-19 RX ADMIN — SODIUM CHLORIDE, SODIUM GLUCONATE, SODIUM ACETATE, POTASSIUM CHLORIDE, MAGNESIUM CHLORIDE, SODIUM PHOSPHATE, DIBASIC, AND POTASSIUM PHOSPHATE: .53; .5; .37; .037; .03; .012; .00082 INJECTION, SOLUTION INTRAVENOUS at 12:04

## 2024-04-19 RX ADMIN — DEXAMETHASONE SODIUM PHOSPHATE 8 MG: 4 INJECTION, SOLUTION INTRAMUSCULAR; INTRAVENOUS at 11:04

## 2024-04-19 NOTE — PLAN OF CARE
Poc reviewed with pt. Verbalized understanding, questions answered, reassurance provided.     Belongings with family

## 2024-04-19 NOTE — ANESTHESIA PREPROCEDURE EVALUATION
04/19/2024  Pre-operative evaluation for Procedure(s) (LRB):  CARPECTOMY - left radial styloidectomy and partial scaphoid excision (Left)    Ang Oden JrIssa is a 68 y.o. male     Patient Active Problem List   Diagnosis    Olecranon bursitis    Gout, arthritis    Hyperlipidemia    Anemia, Hgb 13.1    Lumbago due to displacement of intervertebral disc    Male hypogonadism    Fatty liver    KYLEIGH (obstructive sleep apnea), not using CPAP    Kidney calculi    BPH (benign prostatic hypertrophy)    Diverticulosis    Cardiovascular risk factor, FCVRS 5% on Zocor    Insomnia    Anxiety    Allergic rhinitis    Essential hypertension    History of alcohol abuse    Dysphagia    Pre-hypertension    Aortic atherosclerosis    KYLEIGH on CPAP    Normocytic anemia    SURAJ (generalized anxiety disorder)       Review of patient's allergies indicates:   Allergen Reactions    No known drug allergies        No current facility-administered medications on file prior to encounter.     Current Outpatient Medications on File Prior to Encounter   Medication Sig Dispense Refill    aspirin (ECOTRIN) 81 MG EC tablet Take by mouth. 1 Tablet, Delayed Release (E.C.) Oral Every day      azelastine (ASTELIN) 137 mcg (0.1 %) nasal spray SPRAY 1 SPRAY IN EACH NOSTRIL 2 TIMES A DAY 90 mL 3    citalopram (CELEXA) 20 MG tablet Take 1 tablet (20 mg total) by mouth once daily. 90 tablet 3    fluticasone propionate (FLONASE) 50 mcg/actuation nasal spray 1 SPRAY (50 MCG TOTAL) BY EACH NOSTRIL ROUTE ONCE DAILY. 48 mL PRN    mometasone 0.1% (ELOCON) 0.1 % cream Apply topically 2 (two) times daily as needed. 15 g 3    potassium citrate (UROCIT-K 15) 15 mEq TbSR Take 15 mEq by mouth 2 (two) times a day. Take twice daily for stone prevention. Will see in still 180 tablet 3    tamsulosin (FLOMAX) 0.4 mg Cap Take 2 capsules (0.8 mg total) by mouth once daily.  Take 1 nightly to help pass stone 180 capsule 3    traZODone (DESYREL) 50 MG tablet Take 50 mg by mouth.         Past Surgical History:   Procedure Laterality Date    APPENDECTOMY      COLONOSCOPY N/A 2021    Procedure: COLONOSCOPY;  Surgeon: Christian Santiago MD;  Location: [facility];  Service: Endoscopy;  Laterality: N/A;    ELBOW SURGERY      VASECTOMY         Social History     Socioeconomic History    Marital status:    Tobacco Use    Smoking status: Former     Current packs/day: 0.00     Types: Cigarettes     Quit date: 9/10/1982     Years since quittin.6    Smokeless tobacco: Never   Substance and Sexual Activity    Alcohol use: Yes     Comment: About 1/2 gallon of whiskey per week.  Patient drinks daily.    Drug use: No     Social Determinants of Health     Financial Resource Strain: Patient Declined (3/4/2024)    Overall Financial Resource Strain (CARDIA)     Difficulty of Paying Living Expenses: Patient declined   Food Insecurity: Patient Declined (3/4/2024)    Hunger Vital Sign     Worried About Running Out of Food in the Last Year: Patient declined     Ran Out of Food in the Last Year: Patient declined   Transportation Needs: Patient Declined (3/4/2024)    PRAPARE - Transportation     Lack of Transportation (Medical): Patient declined     Lack of Transportation (Non-Medical): Patient declined   Physical Activity: Sufficiently Active (3/4/2024)    Exercise Vital Sign     Days of Exercise per Week: 7 days     Minutes of Exercise per Session: 30 min   Stress: Stress Concern Present (3/4/2024)    [employer] [employer] - Occupational Stress Questionnaire     Feeling of Stress : To some extent   Social Connections: Unknown (3/4/2024)    Social Connection and Isolation Panel [NHANES]     Frequency of Communication with Friends and Family: More than three times a week     Frequency of Social Gatherings with Friends and Family: Once a week     Active Member of Clubs or  "Organizations: No     Attends Club or Organization Meetings: Never     Marital Status:    Housing Stability: Patient Declined (3/4/2024)    Housing Stability Vital Sign     Unable to Pay for Housing in the Last Year: Patient declined     Number of Places Lived in the Last Year: 1     Unstable Housing in the Last Year: Patient declined         CBC: No results for input(s): "WBC", "RBC", "HGB", "HCT", "PLT", "MCV", "MCH", "MCHC" in the last 72 hours.    CMP: No results for input(s): "NA", "K", "CL", "CO2", "BUN", "CREATININE", "GLU", "MG", "PHOS", "CALCIUM", "ALBUMIN", "PROT", "ALKPHOS", "ALT", "AST", "BILITOT" in the last 72 hours.    INR  No results for input(s): "PT", "INR", "PROTIME", "APTT" in the last 72 hours.        Diagnostic Studies:      EKD Echo:  No results found for this or any previous visit.     Pre-op Assessment    I have reviewed the Patient Summary Reports.     I have reviewed the Nursing Notes. I have reviewed the NPO Status.   I have reviewed the Medications.     Review of Systems  Cardiovascular:     Hypertension                                        Pulmonary:        Sleep Apnea                Musculoskeletal:  Arthritis                   Physical Exam  General: Well nourished and Cooperative    Airway:  Mallampati: II   Mouth Opening: Normal  TM Distance: Normal  Tongue: Normal  Neck ROM: Normal ROM    Chest/Lungs:  Clear to auscultation, Normal Respiratory Rate    Heart:  Rate: Normal  Rhythm: Regular Rhythm  Sounds: Normal        Anesthesia Plan  Type of Anesthesia, risks & benefits discussed:    Anesthesia Type: Gen Natural Airway  Intra-op Monitoring Plan: Standard ASA Monitors  Post Op Pain Control Plan: multimodal analgesia and IV/PO Opioids PRN  Induction:  IV  Airway Plan: Direct and Video, Post-Induction  Informed Consent: Informed consent signed with the Patient and all parties understand the risks and agree with anesthesia plan.  All questions answered.   ASA Score: " 3    Ready For Surgery From Anesthesia Perspective.     .

## 2024-04-19 NOTE — TRANSFER OF CARE
"Anesthesia Transfer of Care Note    Patient: Ang Oden JrIssa    Procedure(s) Performed: Procedure(s) (LRB):  CARPECTOMY - left radial styloidectomy and partial scaphoid excision (Left)    Patient location: PACU    Anesthesia Type: general    Transport from OR: Transported from OR on 6-10 L/min O2 by face mask with adequate spontaneous ventilation    Post pain: adequate analgesia    Post assessment: no apparent anesthetic complications    Post vital signs: stable    Level of consciousness: awake    Nausea/Vomiting: no nausea/vomiting    Complications: none    Transfer of care protocol was followed      Last vitals: Visit Vitals  /70 (BP Location: Right arm, Patient Position: Lying)   Pulse (!) 58   Temp 36.5 °C (97.7 °F) (Temporal)   Resp 18   Ht 5' 8" (1.727 m)   Wt 81.2 kg (179 lb)   SpO2 99%   BMI 27.22 kg/m²     "

## 2024-04-19 NOTE — ANESTHESIA PROCEDURE NOTES
Supraclavicular single shot    Patient location during procedure: pre-op   Block not for primary anesthetic.  Reason for block: at surgeon's request and post-op pain management   Post-op Pain Location: left upper extremity   Start time: 4/19/2024 10:01 AM  Timeout: 4/19/2024 10:00 AM   End time: 4/19/2024 10:03 AM    Staffing  Authorizing Provider: Santy Cool MD  Performing Provider: Santy Cool MD    Staffing  Performed by: Santy Cool MD  Authorized by: Santy Cool MD    Preanesthetic Checklist  Completed: patient identified, IV checked, site marked, risks and benefits discussed, surgical consent, monitors and equipment checked, pre-op evaluation and timeout performed  Peripheral Block  Patient position: supine  Prep: ChloraPrep  Patient monitoring: heart rate, cardiac monitor, continuous pulse ox, continuous capnometry and frequent blood pressure checks  Block type: supraclavicular  Laterality: left  Injection technique: single shot  Needle  Needle type: Stimuplex   Needle gauge: 22 G  Needle length: 2 in  Needle localization: anatomical landmarks and ultrasound guidance   -ultrasound image captured on disc.  Assessment  Injection assessment: negative aspiration, negative parasthesia and local visualized surrounding nerve  Paresthesia pain: none  Heart rate change: no  Slow fractionated injection: yes  Pain Tolerance: comfortable throughout block and no complaints  Medications:    Medications: ropivacaine (NAROPIN) injection 0.5% - Perineural   30 mL - 4/19/2024 10:02:00 AM    Additional Notes  VSS.  [facility] RN monitoring vitals throughout procedure.  Patient tolerated procedure well.

## 2024-04-19 NOTE — DISCHARGE SUMMARY
[facility] - [facility] (Hospital)  Discharge Note  Short Stay    Procedure(s) (LRB):  CARPECTOMY - left radial styloidectomy and partial scaphoid excision (Left)      OUTCOME: Patient tolerated treatment/procedure well without complication and is now ready for discharge.    DISPOSITION: Home or Self Care    FINAL DIAGNOSIS: SLAC wrist with scaphoid osteophyte    FOLLOWUP: In clinic    DISCHARGE INSTRUCTIONS:    Discharge Procedure Orders   Diet general     Call MD for:  temperature >100.4     Call MD for:  persistent nausea and vomiting     Call MD for:  severe uncontrolled pain     Call MD for:  difficulty breathing, headache or visual disturbances     Call MD for:  redness, tenderness, or signs of infection (pain, swelling, redness, odor or green/yellow discharge around incision site)     Call MD for:  hives     Call MD for:  persistent dizziness or light-headedness     Call MD for:  extreme fatigue     Leave dressing on - Keep it clean, dry, and intact until clinic visit        TIME SPENT ON DISCHARGE: 5 minutes

## 2024-04-19 NOTE — PLAN OF CARE
VSS. Pt able to tolerate oral liquids. Pt denies c/o pain. Dressing and sling intact. Spouse received home meds per bedside delivery. No distress noted. Pt states he is ready for D/C. D/C instructions reviewed with pt and spouse, verbalized understanding.

## 2024-04-22 NOTE — ANESTHESIA POSTPROCEDURE EVALUATION
Anesthesia Post Evaluation    Patient: Ang Oden JrIssa    Procedure(s) Performed: Procedure(s) (LRB):  CARPECTOMY - left radial styloidectomy and partial scaphoid excision (Left)  REMOVAL, STYLOID BONE    Final Anesthesia Type: general      Patient location during evaluation: PACU  Patient participation: Yes- Able to Participate  Level of consciousness: awake and alert  Post-procedure vital signs: reviewed and stable  Pain management: adequate  Airway patency: patent  KYLEIGH mitigation strategies: Multimodal analgesia  PONV status at discharge: No PONV  Anesthetic complications: no      Cardiovascular status: blood pressure returned to baseline and hemodynamically stable  Respiratory status: unassisted  Hydration status: euvolemic  Follow-up not needed.              Vitals Value Taken Time   /77 04/19/24 1331   Temp 36.7 °C (98.1 °F) 04/19/24 1345   Pulse 53 04/19/24 1347   Resp 18 04/19/24 1347   SpO2 99 % 04/19/24 1347   Vitals shown include unfiled device data.      Event Time   Out of Recovery 13:30:00         Pain/Veronica Score: No data recorded

## 2024-04-22 NOTE — OP NOTE
DATE OF PROCEDURE: 4/19/2024     COVID-19 attestation:  Due to the nature of this patient's condition and/or the presence of pain, debilitty, and/or dysfunction, proceeding with surgery was indicated.  Furthermore, this patient was treated during the COVID-19 pandemic.  This was discussed with the patient, they are aware of our current policies and procedures, were given the option of delaying their surgery when applicable and if acceptable, and they elect to proceed.  Delaying this patient's surgery greater than 30 days would be detrimental to their care and functional outcome and/or cause additional suffering, pain, discomfort, and/or dysfunction/debility.  This was confirmed and we thus proceeded.      SERVICE:  Orthopedic Surgery.     SURGEON:  Allen Silva M.D.     FIRST ASSISTANT:  MD CHAVO Ortiz     PREOPERATIVE DIAGNOSIS:    Left wrist SLAC arthritis  Severe intractable left wrist pain     POSTOPERATIVE DIAGNOSIS:    Same     PROCEDURE(S) PERFORMED:    Partial scaphoid excision left wrist, CPT code 46511  Radial styloid excision left wrist, CPT code 19566     TOURNIQUET TIME:  47 minutes at 250mmHg.     ESTIMATED BLOOD LOSS:  6 mL.     IMPLANTS:  None     COMPLICATIONS:  None.     PACKS AND DRAINS:  None.     PATHOLOGIC SPECIMENS:  Bone was sent for pathology.    ANESTHESIA:  Regional     IV FLUIDS: Crystalloid.     CONDITION:  Stable.    MICROBIOLOGY: None.    Indications for Procedure:     The patient is a 68-year-old female with severe intractable left wrist pain history of SLAC arthritis.  He was most tender at a large osteophyte and portion of his scaphoid as well as at his styloscaphoid joint.  He wished to proceed with a partial scaphoid excision as well as radial styloidectomy and was not interested in a intercarpal fusion or other salvage procedure at this time.  He understood that these procedures planned for today would not address all of his arthritis and wished to proceed.  The patient has not  responded to adequate non operative treatment at this time and/or non operative treatment is not indicated. Thus, the risks, benefits and alternatives to surgery were discussed with the patient in detail.  Specific risks include but are not limited to bleeding, infection, vessel and/or nerve damage, pain, numbness, tingling, compartment syndrome, need for additional surgery, failure to return to pre-injury and/or preoperative functional status, inability to return to work, scar sensitivity, delayed healing, complex regional pain syndrome, weakness, pulley injury, tendon injury, bowstringing, partial and/or incomplete relief of symptoms, persistence of and/or worsening of symptoms, hardware and/or surgical failure, prominent and/or symptomatic hardware possibly necessitating future removal, osteomyelitis, amputation, loss of function, stiffness, rotational malalignment, functional debility, dysfunction, decreased  strength, need for prolonged postoperative rehabilitation, malunion, nonunion, deep venous thrombosis, pulmonary embolism, arthritis and death.  The patient states an understanding and wishes to proceed with surgery.   All questions were answered.  No guarantees were implied or stated.  Written informed consent was obtained.     Procedure in detail:    On the date of the operative intervention, the patient was evaluated in the preoperative holding area.  With their participation the left upper extremity was marked at the operative site.   The patient was then administered regional anesthesia and taken to the operating room where they were placed on OR table.  The left upper extremity extremity was then placed on a hand table with a nonsterile tourniquet placed high on the patient's left upper extremity.  The left upper extremity extremity was then prepped and draped in the usual sterile fashion and time-out was taken and confirmed by all present to confirm the correct patient site procedure and  administration of preoperative antibiotics if ordered.  All were in agreement so I proceeded.    Esmarch was utilized to exsanguinate the patient's left upper extremity and tourniquet was insufflated to 250 mm mercury where it remained for the duration of the procedure.  An approximately 4 cm incision was marked out overlying the radial aspect of the patient's left wrist centered at the scaphoid.  Skin incision was made sharply with a scalpel dissection was carried down through skin and subcutaneous tissues.  Full-thickness flaps were developed.  The radial sensory nerve was identified retracted and protected.  Radial artery was also identified retracted and protected.  Extensor tendons were identified and retracted to reach the level of the anatomic snuffbox and radial aspect of the left wrist wrist capsule.  This was then sharply incised in line with the skin incision to reveal the underlying scaphoid as well as the radial styloid.  A large portion of the scaphoid was hypertrophic and a partial scaphoid excision was performed under fluoroscopic guidance taking great care to excise the portion of the scaphoid which was symptomatic for the patient.  Once this was completed I then turned my attention towards the radial styloid excision.  Microsagittal saw was utilized to resect a portion of the patient's left radial styloid.  This was done under fluoroscopic guidance.  Bone was passed off the field for pathology.  Final fluoroscopic x-rays were taken I was happy with the resection levels.  The wound was then copiously irrigated with sterile saline and closed in layered fashion with 2-0 Vicryl 4-0 Vicryl and 4-0 nylon.  Sterile dressings were applied including Xeroform 4 x 4 gauze and a splint.  Tourniquet was deflated and brisk capillary refill in [place] throughout.  The patient was then awakened from anesthesia and returned to the post anesthesia care unit stable condition.  There were no complications as attending  surgeon I was present performed the entire procedure.      Postop plan for the patient:  The patient will be discharged home in stable condition.  Nonoperative finger range motion as tolerated.  Follow up in 2 weeks for suture removal and re-evaluation of the postop plan as well as transition to a brace and initiation of occupational therapy.    Please be aware that this note has been generated with the assistance of [employer] voice-to-text.  Please excuse any spelling or grammatical errors.

## 2024-04-29 ENCOUNTER — HOSPITAL ENCOUNTER (OUTPATIENT)
Dept: RADIOLOGY | Facility: OTHER | Age: 69
Discharge: HOME | End: 2024-04-29
Attending: PHYSICIAN ASSISTANT
Payer: MEDICARE

## 2024-04-29 ENCOUNTER — OFFICE VISIT (OUTPATIENT)
Dept: ORTHOPEDICS | Facility: CLINIC | Age: 69
End: 2024-04-29
Payer: MEDICARE

## 2024-04-29 DIAGNOSIS — M19.132 SCAPHOLUNATE ADVANCED COLLAPSE OF LEFT WRIST: Primary | ICD-10-CM

## 2024-04-29 DIAGNOSIS — M25.532 LEFT WRIST PAIN: ICD-10-CM

## 2024-04-29 DIAGNOSIS — M25.532 LEFT WRIST PAIN: Primary | ICD-10-CM

## 2024-04-29 DIAGNOSIS — Z98.890 POSTOPERATIVE STATE: ICD-10-CM

## 2024-04-29 LAB
FINAL PATHOLOGIC DIAGNOSIS: NORMAL
GROSS: NORMAL
Lab: NORMAL
MICROSCOPIC EXAM: NORMAL

## 2024-04-29 PROCEDURE — 99213 OFFICE O/P EST LOW 20 MIN: CPT | Mod: PBBFAC,25 | Performed by: PHYSICIAN ASSISTANT

## 2024-04-29 PROCEDURE — 73110 X-RAY EXAM OF WRIST: CPT | Mod: 26,LT,, | Performed by: RADIOLOGY

## 2024-04-29 PROCEDURE — 99999 PR PBB SHADOW E&M-EST. PATIENT-LVL III: CPT | Mod: PBBFAC,,, | Performed by: PHYSICIAN ASSISTANT

## 2024-04-29 PROCEDURE — 99024 POSTOP FOLLOW-UP VISIT: CPT | Mod: POP,,, | Performed by: PHYSICIAN ASSISTANT

## 2024-04-29 PROCEDURE — 73110 X-RAY EXAM OF WRIST: CPT | Mod: TC,FY,LT

## 2024-04-29 RX ORDER — HYDROCODONE BITARTRATE AND ACETAMINOPHEN 5; 325 MG/1; MG/1
1 TABLET ORAL EVERY 6 HOURS PRN
Qty: 10 TABLET | Refills: 0 | Status: SHIPPED | OUTPATIENT
Start: 2024-04-29

## 2024-04-29 NOTE — PROGRESS NOTES
Dr. Silva is the supervising physician for this encounter/patient    Ang Oden Jr. presents for post-operative evaluation.  The patient is now 2 weeks s/p below procedures with Dr. Silva on 4/19/24.    PROCEDURE(S) PERFORMED:    Partial scaphoid excision left wrist, CPT code 82168  Radial styloid excision left wrist, CPT code 73058    Overall the patient reports doing well.  He reports 5/10 pain, denies any f/c/s. He is taking Tylenol as needed, ran out of hydrocodone but does feel that night time pain is worse.     PE:    AA&O x 4.  NAD  HEENT:  NCAT, sclera nonicteric  Lungs:  Respirations are equal and unlabored.  CV:  2+ bilateral upper and lower extremity pulses.  MSK: The wound is healing well with no signs of erythema or warmth.  There is no drainage.  No clinical signs or symptoms of infection are present.  He can make a fist, pain with wrist motion. SILT. DNVI.    IMAGING - reviewed with patient  FINDINGS:  Ossific densities along the radial aspect of the scaphoid have been resected in the interim.  Radial styloid has also been resected.  A few punctate ossific densities at the operative site.     Widening of the scapholunate interval with proximal migration of the capitate which can be seen with SLAC wrist.     No acute fracture.     Impression:     Please see above.    A/P: Status post above, doing well  1) Transition to brace today, OT ordered for postop rehab.  2) All sutures removed today. Wound care and signs of infection discussed. Scar massage discussed.  3) F/U 4 weeks  4) Call with any questions/concerns in the interim      Jamie Russo-DiGeorge PA-C

## 2024-05-03 ENCOUNTER — CLINICAL SUPPORT (OUTPATIENT)
Dept: REHABILITATION | Facility: HOSPITAL | Age: 69
End: 2024-05-03
Payer: MEDICARE

## 2024-05-03 DIAGNOSIS — M25.642 STIFFNESS OF LEFT HAND JOINT: ICD-10-CM

## 2024-05-03 DIAGNOSIS — Z98.890 POSTOPERATIVE STATE: ICD-10-CM

## 2024-05-03 DIAGNOSIS — M79.642 LEFT HAND PAIN: Primary | ICD-10-CM

## 2024-05-03 DIAGNOSIS — R29.898 LEFT HAND WEAKNESS: ICD-10-CM

## 2024-05-03 DIAGNOSIS — M19.132 SCAPHOLUNATE ADVANCED COLLAPSE OF LEFT WRIST: ICD-10-CM

## 2024-05-03 DIAGNOSIS — M25.442 EFFUSION OF LEFT HAND: ICD-10-CM

## 2024-05-03 PROCEDURE — 97165 OT EVAL LOW COMPLEX 30 MIN: CPT | Mod: PN

## 2024-05-03 NOTE — PATIENT INSTRUCTIONS
home program  5-3-24   -keep incision site covered with bandaid, change bandaid once a day, change immediately if it gets wet  -wash incision site with saline water, dry well prior to placing bandaid  -wear splint all the time except for hygiene, therapy routine, and to let skin breathe as needed  -use hand for extremely light use only when in splint (dressing, hygiene, eating, etc.) and no use when out of splint  -do below exercises 10 times, 6 x day    *hold below thumb knuckle with right hand like a key, bend and straighten left thumb knuckle

## 2024-05-03 NOTE — PLAN OF CARE
[facility] Occupational Therapy  Initial Evaluation     Date: 5/3/2024  Name: Ang Oden Jr.  Clinic Number: [id number]    Therapy Diagnosis:   Encounter Diagnoses   Name Primary?    Scapholunate advanced collapse of left wrist     Postoperative state     Left hand pain Yes    Stiffness of left hand joint     Left hand weakness     Effusion of left hand      Physician: Russo-Digeorge, Jamie L*    Physician Orders: evaluate and tx, see epic    DATE OF PROCEDURE: 4/19/2024     COVID-19 attestation:  Due to the nature of this patient's condition and/or the presence of pain, debilitty, and/or dysfunction, proceeding with surgery was indicated.  Furthermore, this patient was treated during the COVID-19 pandemic.  This was discussed with the patient, they are aware of our current policies and procedures, were given the option of delaying their surgery when applicable and if acceptable, and they elect to proceed.  Delaying this patient's surgery greater than 30 days would be detrimental to their care and functional outcome and/or cause additional suffering, pain, discomfort, and/or dysfunction/debility.  This was confirmed and we thus proceeded.       SERVICE:  Orthopedic Surgery.     SURGEON:  Allen Silva M.D.     FIRST ASSISTANT:  MD CHAVO Ortiz     PREOPERATIVE DIAGNOSIS:    Left wrist SLAC arthritis  Severe intractable left wrist pain     POSTOPERATIVE DIAGNOSIS:    Same     PROCEDURE(S) PERFORMED:    Partial scaphoid excision left wrist, CPT code 11112  Radial styloid excision left wrist, CPT code 66722            See surgical report for details if applicable      Evaluation Date: 5/3/2024  Insurance Authorization Period Expiration: referral [id number] 4-29-24 thru 4-29-25              Plan of Care Certification Period: 5-3-24 thru 7-26-24                            Date of Return to referral source's office: see [facility]    Visit # / Visits authorized: 1 / 1 referral [id number] 4-29-24 thru  4-29-25  Time In:7  Time Out: 725  Total Billable Time: HEP took less than 5 minutes    Precautions:  universal, see epic, protocol if applicable  Subjective     Involved Side: left  Dominant Side: left  Date of Onset: years ago  History of Current Condition/Mechanism of Injury: insidious onset, failed nonoperative care, had surgery, rehab ordered  Imaging: see epic  Previous Therapy: none for above surgery    Past Medical History/Physical Systems Review:   Ang Oden Jr.  has a past medical history of Anemia, Anticoagulant long-term use, Chronic kidney disease, Chronic rhinitis, Disc, DJD (degenerative joint disease), Foot pain, Gout, unspecified, HBP (high blood pressure), High cholesterol, Kidney stone, Sleep apnea, and Weight loss.    Ang Oden Jr.  has a past surgical history that includes Appendectomy; Vasectomy; Elbow surgery; Colonoscopy (N/A, 11/11/2021); Carpectomy (Left, 4/19/2024); and removal (4/19/2024).    Ang has a current medication list which includes the following prescription(s): acetaminophen, aspirin, azelastine, citalopram, fluticasone propionate, hydrocodone-acetaminophen, ibuprofen, mometasone 0.1%, multivitamin, oxycodone-acetaminophen, potassium citrate, tamsulosin, and trazodone, and the following Facility-Administered Medications: fentanyl and midazolam.    Review of patient's allergies indicates:   Allergen Reactions    No known drug allergies         Patient's Goals for Therapy: full painless use    Pain:  Functional Pain Scale Rating 0-10:   5/10 on average  3/10 at best  710 at worst  Side:left  Location: diffuse radial wrist  Description: ache  Aggravating Factors: light wrist ROM  Easing Factors: rest  Occupation:  retired  Working presently: no  Duties: per job if working; typical self and home care    Functional Limitations/Social History:    Previous functional status includes: Independent with all ADLs.     Current Functional Status   Home/Living environment :  lives with spouse    Limitation of Functional Status as follows: decreased motion, use, and strength with ADL   ADLs/IADLs:               See above   Leisure: not tested  pain meds: per referring provider  nicotine: denies  caffeine: 2-3 cans of soft drink daily    Objective   Posture: bandaid on incisional area, in prefabricated wrist splint                                            healing incision per surgery patient sent here for  Palpation:not tested  Sensation:denies burning, numbness, tingling  ROM: prom thumb ip 0/70 mcp 0/70; index thru small full all planes; 1st cmc and wrist secured in splint          Edema: mild thru wrist  DME:see above      Removed bandaid  Applied nonstick pad secured with coflex    CMS Impairment/Limitation/Restriction for FOTO Survey    Therapist reviewed FOTO scores for Ang Oden Jr. on 5/3/2024.   FOTO documents entered into [employer] - see Media section.             In basket messaging with dr. aponte clarified ROM and strengthening progression    Treatment     Treatment Time In: 720  Treatment Time Out: 725  Total Treatment time separate from Evaluation time:5      Kenny received therapeutic exercises for 5 minutes including:  -see above and/or media section                Home Exercise Program/Education:  Issued HEP (see patient instructions in EMR in media or note) . Exercises were reviewed and Kenny was able to demonstrate them prior to the end of the session.   Pt received a written copy of exercises to perform at home. Kenny demonstrated good understanding of the education provided.  Pt was advised to perform these exercises free of pain, and to stop performing them if pain occurs.    Patient/Family Education: role of OT, goals for OT, scheduling/cancellations - pt verbalized understanding. Discussed insurance limitations with patient.    Additional Education provided: likely tx progression, expectations of rehab      Explained to stop wrist ROM     Explained to do no ROM  except what he learns from me    Assessment     Ang Oden Jr. is a 68 y.o. male referred to outpatient occupational therapy and presents with a medical diagnosis of see above resulting in Decreased ROM, Decreased muscle strength, Decreased functional hand use, Increased pain, Edema, Joint Stiffness, and Scar Adhesions and demonstrates limitations as described in the chart below. Following medical record review it is determined that pt will benefit from occupational therapy services in order to maximize pain free and/or functional use of left hand. The following goals were discussed with the patient and patient is in agreement with them as to be addressed in the treatment plan. The patient's rehab potential is fair to good.     Anticipated barriers to occupational therapy: chronic wrist arthritis, edema, pain, stiffness, scar, weakness; need for education of likely and proper tx progression, estimated tx duration based on extent of symptoms pre OT and current deficits with functional use  Pt has no cultural, educational or language barriers to learning provided.    Profile and History Assessment of Occupational Performance Level of Clinical Decision Making Complexity Score   Occupational Profile:   Ang Oden Jr. is a 68 y.o. male who lives with their spouse and is retired Ang Oden Jr. has difficulty with  ADLs and IADLs as listed previously, which  affecting his/her daily functional abilities.      Comorbidities:    has a past medical history of Anemia, Anticoagulant long-term use, Chronic kidney disease, Chronic rhinitis, Disc, DJD (degenerative joint disease), Foot pain, Gout, unspecified, HBP (high blood pressure), High cholesterol, Kidney stone, Sleep apnea, and Weight loss.    Medical and Therapy History Review:   Brief               Performance Deficits    Physical:  Joint Mobility  Muscle Power/Strength  Skin Integrity/Scar Formation  Edema  Pain    Cognitive:  No Deficits    Psychosocial:     No Deficits     Clinical Decision Making:  low    Assessment Process:  Problem-Focused Assessments    Modification/Need for Assistance:  Not Necessary    Intervention Selection:  Limited Treatment Options       low  Based on PMHX, co morbidities , data from assessments and functional level of assistance required with task and clinical presentation directly impacting function.           The following goals were discussed with the patient and patient is in agreement with them as to be addressed in the treatment plan.     Goals:   Short term goals to be met in 4 weeks 1. Patient will be I with HEP 2. Patient will have 2/10 pain with light use 3. Patient will have = prom of bilateral forearm thru hands to enhance affected arm use with ADL      Long term goals to be met by d/c 1. Patient will be I with d/c HEP 2. Patient will have 1/10 pain with light use 3. Patient will have about 75% /pinch on affected side vs unaffected side to promote full functional use                                                                 4. Patient will be I with all light ADL      all goals ongoing unless noted above or met today or in past but not noted yet    Plan   Certification Period/Plan of care expiration: 5/3/2024 to             7-26-24    Outpatient Occupational Therapy  2 times weekly for 12 weeks to include the following interventions: eval and tx    Above frequency and duration in above dates may change based on patient progress and need for therapy    Kennedy PABLO CHT

## 2024-05-17 ENCOUNTER — CLINICAL SUPPORT (OUTPATIENT)
Dept: REHABILITATION | Facility: HOSPITAL | Age: 69
End: 2024-05-17
Payer: MEDICARE

## 2024-05-17 DIAGNOSIS — M25.442 EFFUSION OF LEFT HAND: ICD-10-CM

## 2024-05-17 DIAGNOSIS — R29.898 LEFT HAND WEAKNESS: ICD-10-CM

## 2024-05-17 DIAGNOSIS — M79.642 LEFT HAND PAIN: Primary | ICD-10-CM

## 2024-05-17 DIAGNOSIS — M25.642 STIFFNESS OF LEFT HAND JOINT: ICD-10-CM

## 2024-05-17 PROCEDURE — 97110 THERAPEUTIC EXERCISES: CPT | Mod: PN

## 2024-05-17 NOTE — PROGRESS NOTES
Occupational Therapy Daily Treatment Note     Date: 5/17/2024  Name: Ang Oden Jr.  Clinic Number: [id number]    Therapy Diagnosis:   Encounter Diagnoses   Name Primary?    Left hand pain Yes    Stiffness of left hand joint     Left hand weakness     Effusion of left hand      Physician: Russo-Digeorge, Jamie L*        Physician Orders: evaluate and tx, see epic     DATE OF PROCEDURE: 4/19/2024     COVID-19 attestation:  Due to the nature of this patient's condition and/or the presence of pain, debilitty, and/or dysfunction, proceeding with surgery was indicated.  Furthermore, this patient was treated during the COVID-19 pandemic.  This was discussed with the patient, they are aware of our current policies and procedures, were given the option of delaying their surgery when applicable and if acceptable, and they elect to proceed.  Delaying this patient's surgery greater than 30 days would be detrimental to their care and functional outcome and/or cause additional suffering, pain, discomfort, and/or dysfunction/debility.  This was confirmed and we thus proceeded.       SERVICE:  Orthopedic Surgery.     SURGEON:  Allen Silva M.D.     FIRST ASSISTANT:  MD CHAVO Ortiz     PREOPERATIVE DIAGNOSIS:    Left wrist SLAC arthritis  Severe intractable left wrist pain     POSTOPERATIVE DIAGNOSIS:    Same     PROCEDURE(S) PERFORMED:    Partial scaphoid excision left wrist, CPT code 93669  Radial styloid excision left wrist, CPT code 15197                 See surgical report for details if applicable        Evaluation Date: 5/3/2024  Insurance Authorization Period Expiration: referral [id number] 5-3-24 thru 12-31-24     Plan of Care Certification Period: 5-3-24 thru 7-26-24                            Date of Return to referral source's office: see [facility]     Visit # / Visits authorized: 1 / 10 referral [id number] 5-3-24 thru 12-31-24                Time In:7  Time Out: 715  Total Billable Time: 15 minutes    Precautions:   universal, see epic, protocol if applicable    Subjective     Pt reports: an understanding stretching will be issued at next visit  he is compliant with home exercise program.  Response to previous treatment:good  Functional change: light use in splint improving    Pain: 3/10 rest; up to 7/10 light use  Side:left  Location: diffuse      radial wrist   sharp  Objective       Timed units:  1 therapeutic exercise                            time:7-245          Measurements/special tests/observations: n/a        Fluidotherapy: n/a    Ultrasound:n/a      Upper body ergometer: n/a    Scar massage: n/a    Stretches as tolerated-pain free: n/a      Manual: n/a    Range of motion: per today's HEP    Progressive resistive exercise: n/a      Home exercise program: see above and/or below    Home Exercises and Education Provided     Education provided:     See above          Gently massage scar with lotion at leas 5 minutes 4 x day    progress towards goals   likely treatment progression  rationale of rehab interventions    Written Home Exercises/Information Provided: yes.        previously issued exercises and/or other issued home therapy instructions were reviewed if still part of current treatment plan as well as any issued today and Kenny was able to demonstrate them prior to the end of the session.  Kenny demonstrated good understanding of the HEP provided.     See EMR under patient instructions and/or media for HEP issued today or in past    Assessment             Pt would continue to benefit from skilled occupational therapy in order to facilitate strong, painless, and full use.    Kenny is progressing well towards his goals and there are no updates to goals at this time unless goals noted below are different than goals on previous notes or evaluation. Pt prognosis is good  Pt will continue to benefit from skilled outpatient occupational therapy to address the deficits listed in the problem list on initial evaluation to provide  pt/family education and to maximize pt's level of independence in the home and community environment.     Anticipated barriers to occupational therapy: edema, pain, stiffness, scar, weakness; need for education of likely and proper tx progression, estimated tx duration based on extent of symptoms pre OT and current deficits with functional use    Pt's spiritual, cultural and educational needs considered and pt agreeable to plan of care and goals.    Goals:  Short term goals to be met in 4 weeks 1. Patient will be I with HEP 2. Patient will have 2/10 pain with light use 3. Patient will have = prom of bilateral forearm thru hands to enhance affected arm use with ADL        Long term goals to be met by d/c 1. Patient will be I with d/c HEP 2. Patient will have 1/10 pain with light use 3. Patient will have about 75% /pinch on affected side vs unaffected side to promote full functional use                                                                 4. Patient will be I with all light ADL       all goals ongoing unless noted above or met today or in past but not noted yet            Any goals met today ?  (if any met see above)    Updates/Grading for next session: as needed    Plan: evaluate and treat    Kennedy PABLO CHT

## 2024-05-27 ENCOUNTER — OFFICE VISIT (OUTPATIENT)
Dept: ORTHOPEDICS | Facility: CLINIC | Age: 69
End: 2024-05-27
Payer: MEDICARE

## 2024-05-27 ENCOUNTER — CLINICAL SUPPORT (OUTPATIENT)
Dept: REHABILITATION | Facility: HOSPITAL | Age: 69
End: 2024-05-27
Payer: MEDICARE

## 2024-05-27 VITALS — WEIGHT: 179 LBS | HEIGHT: 68 IN | BODY MASS INDEX: 27.13 KG/M2

## 2024-05-27 DIAGNOSIS — M79.642 LEFT HAND PAIN: Primary | ICD-10-CM

## 2024-05-27 DIAGNOSIS — M25.642 STIFFNESS OF LEFT HAND JOINT: ICD-10-CM

## 2024-05-27 DIAGNOSIS — M19.132 SCAPHOLUNATE ADVANCED COLLAPSE OF LEFT WRIST: Primary | ICD-10-CM

## 2024-05-27 PROCEDURE — 97110 THERAPEUTIC EXERCISES: CPT | Mod: PN

## 2024-05-27 PROCEDURE — 99024 POSTOP FOLLOW-UP VISIT: CPT | Mod: POP,,, | Performed by: ORTHOPAEDIC SURGERY

## 2024-05-27 PROCEDURE — 99213 OFFICE O/P EST LOW 20 MIN: CPT | Mod: PBBFAC | Performed by: ORTHOPAEDIC SURGERY

## 2024-05-27 PROCEDURE — 99999 PR PBB SHADOW E&M-EST. PATIENT-LVL III: CPT | Mod: PBBFAC,,, | Performed by: ORTHOPAEDIC SURGERY

## 2024-05-27 NOTE — PROGRESS NOTES
Dr. Silva is the supervising physician for this encounter/patient    Ang Oden Jr. presents for post-operative evaluation.  The patient is now 6 weeks s/p below procedures with Dr. Silva on 4/19/24.    PROCEDURE(S) PERFORMED:    Partial scaphoid excision left wrist, CPT code 24169  Radial styloid excision left wrist, CPT code 98719    Overall the patient reports doing well.  He reports 4/10 pain, denies any f/c/s.  He has been in a brace.  Doing well.  He is attending occupational therapy.  PE:    AA&O x 4.  NAD  HEENT:  NCAT, sclera nonicteric  Lungs:  Respirations are equal and unlabored.  CV:  2+ bilateral upper and lower extremity pulses.  MSK: The wound is healing well with no signs of erythema or warmth.  There is no drainage.  No clinical signs or symptoms of infection are present.  He can make a fist, pain with wrist motion. SILT. DNVI.    IMAGING - reviewed with patient  FINDINGS:  Ossific densities along the radial aspect of the scaphoid have been resected in the interim.  Radial styloid has also been resected.  A few punctate ossific densities at the operative site.     Widening of the scapholunate interval with proximal migration of the capitate which can be seen with SLAC wrist.     No acute fracture.     Impression:     Please see above.    A/P: Status post above, doing well  1) range of motion as tolerated.  Continue brace intermittently as needed.  Continue therapy.  Follow up in 6 weeks for re-evaluation or sooner for any problems.

## 2024-05-27 NOTE — PROGRESS NOTES
Occupational Therapy Daily Treatment Note     Date: 5/27/2024  Name: Ang Oden Jr.  Clinic Number: [id number]    Therapy Diagnosis:   Encounter Diagnoses   Name Primary?    Left hand pain Yes    Stiffness of left hand joint      Physician: Russo-Digeorge, Jamie L*        Physician Orders: evaluate and tx, see epic     DATE OF PROCEDURE: 4/19/2024     COVID-19 attestation:  Due to the nature of this patient's condition and/or the presence of pain, debilitty, and/or dysfunction, proceeding with surgery was indicated.  Furthermore, this patient was treated during the COVID-19 pandemic.  This was discussed with the patient, they are aware of our current policies and procedures, were given the option of delaying their surgery when applicable and if acceptable, and they elect to proceed.  Delaying this patient's surgery greater than 30 days would be detrimental to their care and functional outcome and/or cause additional suffering, pain, discomfort, and/or dysfunction/debility.  This was confirmed and we thus proceeded.       SERVICE:  Orthopedic Surgery.     SURGEON:  Allen Silva M.D.     FIRST ASSISTANT:  MD Diana RES     PREOPERATIVE DIAGNOSIS:    Left wrist SLAC arthritis  Severe intractable left wrist pain     POSTOPERATIVE DIAGNOSIS:    Same     PROCEDURE(S) PERFORMED:    Partial scaphoid excision left wrist, CPT code 20731  Radial styloid excision left wrist, CPT code 74575                 See surgical report for details if applicable        Evaluation Date: 5/3/2024  Insurance Authorization Period Expiration: referral [id number] 5-3-24 thru 12-31-24     Plan of Care Certification Period: 5-3-24 thru 7-26-24                            Date of Return to referral source's office: see [facility]     Visit # / Visits authorized: 2 / 10 referral [id number] 5-3-24 thru 12-31-24                Time In: 3  Time Out: 315  Total Billable Time: 15 minutes    Precautions:  universal, see epic, protocol if  applicable    Subjective     Pt reports: saw ortho since last visit, say ortho did not issue any activity restrictions  he is compliant with home exercise program.  Response to previous treatment:good  Functional change: light use improving day to day    Pain: 3/10 rest; up to 7/10 light use  Side:left  Location: diffuse      radial wrist   sharp  Objective       Timed units:  1 therapeutic exercise                            time:3-315          Measurements/special tests/observations:       Prom             right            left  Sup/pro        90/90           90/90  Df/pf             70/70           40/40   Rd/ud           20/40          20/30    Thumb full all planes vs unaffected side        Fluidotherapy: n/a    Ultrasound:n/a      Upper body ergometer: n/a    Scar massage: n/a    Stretches as tolerated-pain free: per today's hep      Manual: n/a    Range of motion: n/a    Progressive resistive exercise: n/a      Home exercise program: see above and/or below    Home Exercises and Education Provided     Education provided:     Explained continued clinic OT is nonessential    Explained it is unpredictable how much left wrist prom will improve due to status of arthritis in left wrist    Explained any  and pinch weakness he may have likely will decrease as he slowly progresses activity pain-free    Do stretches for at least 1 month    Written Home Exercises/Information Provided: yes.        previously issued exercises and/or other issued home therapy instructions were reviewed if still part of current treatment plan as well as any issued today and Kenny was able to demonstrate them prior to the end of the session.  Kenny demonstrated good understanding of the HEP provided.     See EMR under patient instructions and/or media for HEP issued today or in past    Assessment             Pt's spiritual, cultural and educational needs considered and pt agreeable to plan of care and goals.    Goals:  Short term goals to be  met in 4 weeks 1. Patient will be I with HEP -met 5-27-24 2. Patient will have 2/10 pain with light use 3. Patient will have = prom of bilateral forearm thru hands to enhance affected arm use with ADL        Long term goals to be met by d/c 1. Patient will be I with d/c HEP -met 5-. Patient will have 1/10 pain with light use 3. Patient will have about 75% /pinch on affected side vs unaffected side to promote full functional use                                                                 4. Patient will be I with all light ADL  -met 5-27-24     all goals ongoing unless noted above or met today or in past but not noted yet            Any goals met today ?  (if any met see above)    Updates/Grading for next session: as needed    Plan: evaluate and treat    Kennedy PABLO CHT

## 2024-05-27 NOTE — PATIENT INSTRUCTIONS
current home program 5-27-24   -follow doctor's lead on activity progression  -do scar massage for at least 6 weeks  -do below stretches one time, at least 1 x day, at least a 10 minute hold, mild discomfort only  *use right hand to push left wrist straight back  *use right hand to push left wrist straight forward    *use right hand to push relaxed left fist forward

## 2024-05-28 ENCOUNTER — DOCUMENTATION ONLY (OUTPATIENT)
Dept: REHABILITATION | Facility: HOSPITAL | Age: 69
End: 2024-05-28
Payer: MEDICARE

## 2024-05-28 NOTE — PROGRESS NOTES
Outpatient Therapy Discharge Summary     Date: 5-28-24      Name: Ang Oden Jr.  Clinic Number: [id number]    Therapy Diagnosis: No diagnosis found.  Physician: No ref. provider found    Physician Orders: see evaluation  Medical Diagnosis: see evaluation  Evaluation Date: 5-3-24      Date of Last visit: 5-27-24  Total Visits Received: 3  Cancelled Visits: see epic  No Show Visits: see epic    Assessment    Goals: see last note    Discharge reason: Patient has reached the maximum rehab potential for the present time    Plan   This patient is discharged from Occupational Therapy

## 2024-06-07 ENCOUNTER — PATIENT MESSAGE (OUTPATIENT)
Dept: FAMILY MEDICINE | Facility: CLINIC | Age: 69
End: 2024-06-07
Payer: MEDICARE

## 2024-06-20 ENCOUNTER — TELEPHONE (OUTPATIENT)
Dept: ENDOCRINOLOGY | Facility: CLINIC | Age: 69
End: 2024-06-20
Payer: MEDICARE

## 2024-06-20 NOTE — TELEPHONE ENCOUNTER
Called and spoke with pt regarding referral to endocrinology.  New pt appt set for September 2024 (1st available for new pt)

## 2024-06-20 NOTE — TELEPHONE ENCOUNTER
----- Message from Chiqui Kemp LPN sent at 6/20/2024 11:46 AM CDT -----    ----- Message -----  From: Kellie Stoddard  Sent: 6/20/2024  11:34 AM CDT  To: [facility] Clinical Staff    Type:  Sooner Appointment Request    Caller is requesting a sooner appointment.  Caller declined first available appointment listed below.  Caller will not accept being placed on the waitlist and is requesting a message be sent to doctor.    Name of Caller:  pt  When is the first available appointment?  N/A  Symptoms:  hormone issues  Would the patient rather a call back or a response via [facility]? Call back, portal  Best Call Back Number:  [phone]  Additional Information:  pt states that first available does not work for them and needs to be seen to establish care. Pt has a referral and needs to get a call about scheduling and appt. Please call back and advise. Thanks!

## 2024-07-02 ENCOUNTER — PATIENT MESSAGE (OUTPATIENT)
Dept: ORTHOPEDICS | Facility: CLINIC | Age: 69
End: 2024-07-02
Payer: MEDICARE

## 2024-07-02 DIAGNOSIS — M19.132 SCAPHOLUNATE ADVANCED COLLAPSE OF LEFT WRIST: Primary | ICD-10-CM

## 2024-07-08 ENCOUNTER — OFFICE VISIT (OUTPATIENT)
Dept: ORTHOPEDICS | Facility: CLINIC | Age: 69
End: 2024-07-08
Payer: MEDICARE

## 2024-07-08 ENCOUNTER — HOSPITAL ENCOUNTER (OUTPATIENT)
Dept: RADIOLOGY | Facility: OTHER | Age: 69
Discharge: HOME | End: 2024-07-08
Attending: ORTHOPAEDIC SURGERY
Payer: MEDICARE

## 2024-07-08 VITALS — WEIGHT: 179 LBS | BODY MASS INDEX: 27.13 KG/M2 | HEIGHT: 68 IN

## 2024-07-08 DIAGNOSIS — M19.132 SCAPHOLUNATE ADVANCED COLLAPSE OF LEFT WRIST: Primary | ICD-10-CM

## 2024-07-08 DIAGNOSIS — M19.132 SCAPHOLUNATE ADVANCED COLLAPSE OF LEFT WRIST: ICD-10-CM

## 2024-07-08 PROCEDURE — 99999PBSHW PR PBB SHADOW TECHNICAL ONLY FILED TO HB: Mod: PBBFAC,,,

## 2024-07-08 PROCEDURE — 20605 DRAIN/INJ JOINT/BURSA W/O US: CPT | Mod: PBBFAC | Performed by: ORTHOPAEDIC SURGERY

## 2024-07-08 PROCEDURE — 99211 OFF/OP EST MAY X REQ PHY/QHP: CPT | Mod: PBBFAC,25 | Performed by: ORTHOPAEDIC SURGERY

## 2024-07-08 PROCEDURE — 73110 X-RAY EXAM OF WRIST: CPT | Mod: 26,LT,, | Performed by: RADIOLOGY

## 2024-07-08 PROCEDURE — 99999 PR PBB SHADOW E&M-EST. PATIENT-LVL I: CPT | Mod: PBBFAC,,, | Performed by: ORTHOPAEDIC SURGERY

## 2024-07-08 PROCEDURE — 73110 X-RAY EXAM OF WRIST: CPT | Mod: TC,FY,LT

## 2024-07-08 RX ORDER — ACETAMINOPHEN 500 MG
500 TABLET ORAL EVERY 8 HOURS PRN
Qty: 90 TABLET | Refills: 2 | Status: SHIPPED | OUTPATIENT
Start: 2024-07-08

## 2024-07-08 RX ORDER — TRIAMCINOLONE ACETONIDE 40 MG/ML
40 INJECTION, SUSPENSION INTRA-ARTICULAR; INTRAMUSCULAR
Status: DISCONTINUED | OUTPATIENT
Start: 2024-07-08 | End: 2024-07-08 | Stop reason: HOSPADM

## 2024-07-08 RX ADMIN — TRIAMCINOLONE ACETONIDE 40 MG: 40 INJECTION, SUSPENSION INTRA-ARTICULAR; INTRAMUSCULAR at 08:07

## 2024-07-08 NOTE — PROGRESS NOTES
Ang Oden Jr. presents for post-operative evaluation.  The patient is now three months s/p below procedures with Dr. Silva on 4/19/24.      PROCEDURE(S) PERFORMED:    Partial scaphoid excision left wrist, CPT code 31121  Radial styloid excision left wrist, CPT code 04491    Overall the patient reports doing well.  He notes perhaps a slight benefit as far as pain relief from his surgery but that his pain remains overall fairly similar.  He returns for re-evaluation with no other complaints.        PE:    AA&O x 4.  NAD  HEENT:  NCAT, sclera nonicteric  Lungs:  Respirations are equal and unlabored.  CV:  2+ bilateral upper and lower extremity pulses.  MSK: The wound is healing well with no signs of erythema or warmth.  There is no drainage.  No clinical signs or symptoms of infection are present.  He can make a fist, pain with wrist motion. SILT. DNVI.    IMAGING - reviewed with patient  FINDINGS:  SLAC arthritis with expected postop changes status post scaphoid bone spur excision and radial styloidectomy     A/P: Status post above, doing well  1) unfortunately, this procedure did not address all of rods arthritic pain.  He was very well aware prior to surgery that this would not address his underlying SLAC arthritis.  We will perform a wrist injection today and he may follow up at any time should he wish to consider a salvage procedures such as proximal row carpectomy.

## 2024-07-08 NOTE — PROCEDURES
Intermediate Joint Aspiration/Injection: L radiocarpal    Date/Time: 7/8/2024 8:30 AM    Performed by: Allen Silva MD  Authorized by: Allen Silva MD    Consent Done?:  Yes (Verbal)  Indications:  Pain  Site marked: The procedure site was marked    Timeout: Prior to procedure the correct patient, procedure, and site was verified      Location:  Wrist  Site:  L radiocarpal  Prep: Patient was prepped and draped in usual sterile fashion    Ultrasonic Guidance for needle placement: No  Needle size:  25 G  Approach:  Dorsal  Medications:  40 mg triamcinolone acetonide 40 mg/mL  Patient tolerance:  Patient tolerated the procedure well with no immediate complications

## 2024-09-06 ENCOUNTER — LAB VISIT (OUTPATIENT)
Dept: LAB | Facility: HOSPITAL | Age: 69
End: 2024-09-06
Attending: FAMILY MEDICINE
Payer: MEDICARE

## 2024-09-06 DIAGNOSIS — M10.9 GOUT, ARTHRITIS: ICD-10-CM

## 2024-09-06 DIAGNOSIS — D64.9 NORMOCYTIC ANEMIA: ICD-10-CM

## 2024-09-06 DIAGNOSIS — E78.2 MIXED HYPERLIPIDEMIA: ICD-10-CM

## 2024-09-06 DIAGNOSIS — Z12.5 PROSTATE CANCER SCREENING: ICD-10-CM

## 2024-09-06 LAB
ALBUMIN SERPL BCP-MCNC: 3.7 G/DL (ref 3.5–5.2)
ALP SERPL-CCNC: 53 U/L (ref 55–135)
ALT SERPL W/O P-5'-P-CCNC: 22 U/L (ref 10–44)
ANION GAP SERPL CALC-SCNC: 11 MMOL/L (ref 8–16)
AST SERPL-CCNC: 26 U/L (ref 10–40)
BASOPHILS # BLD AUTO: 0.03 K/UL (ref 0–0.2)
BASOPHILS NFR BLD: 0.4 % (ref 0–1.9)
BILIRUB SERPL-MCNC: 1.2 MG/DL (ref 0.1–1)
BUN SERPL-MCNC: 14 MG/DL (ref 8–23)
CALCIUM SERPL-MCNC: 9.4 MG/DL (ref 8.7–10.5)
CHLORIDE SERPL-SCNC: 106 MMOL/L (ref 95–110)
CHOLEST SERPL-MCNC: 225 MG/DL (ref 120–199)
CHOLEST/HDLC SERPL: 3.9 {RATIO} (ref 2–5)
CO2 SERPL-SCNC: 23 MMOL/L (ref 23–29)
COMPLEXED PSA SERPL-MCNC: 0.55 NG/ML (ref 0–4)
CREAT SERPL-MCNC: 0.9 MG/DL (ref 0.5–1.4)
DIFFERENTIAL METHOD BLD: ABNORMAL
EOSINOPHIL # BLD AUTO: 0.4 K/UL (ref 0–0.5)
EOSINOPHIL NFR BLD: 4.4 % (ref 0–8)
ERYTHROCYTE [DISTWIDTH] IN BLOOD BY AUTOMATED COUNT: 13.7 % (ref 11.5–14.5)
EST. GFR  (NO RACE VARIABLE): >60 ML/MIN/1.73 M^2
FERRITIN SERPL-MCNC: 297 NG/ML (ref 20–300)
GLUCOSE SERPL-MCNC: 97 MG/DL (ref 70–110)
HCT VFR BLD AUTO: 38.3 % (ref 40–54)
HDLC SERPL-MCNC: 57 MG/DL (ref 40–75)
HDLC SERPL: 25.3 % (ref 20–50)
HGB BLD-MCNC: 12.8 G/DL (ref 14–18)
IMM GRANULOCYTES # BLD AUTO: 0.03 K/UL (ref 0–0.04)
IMM GRANULOCYTES NFR BLD AUTO: 0.4 % (ref 0–0.5)
IRON SERPL-MCNC: 60 UG/DL (ref 45–160)
LDLC SERPL CALC-MCNC: 154.4 MG/DL (ref 63–159)
LYMPHOCYTES # BLD AUTO: 1.4 K/UL (ref 1–4.8)
LYMPHOCYTES NFR BLD: 17.4 % (ref 18–48)
MCH RBC QN AUTO: 31.4 PG (ref 27–31)
MCHC RBC AUTO-ENTMCNC: 33.4 G/DL (ref 32–36)
MCV RBC AUTO: 94 FL (ref 82–98)
MONOCYTES # BLD AUTO: 0.9 K/UL (ref 0.3–1)
MONOCYTES NFR BLD: 11.5 % (ref 4–15)
NEUTROPHILS # BLD AUTO: 5.3 K/UL (ref 1.8–7.7)
NEUTROPHILS NFR BLD: 65.9 % (ref 38–73)
NONHDLC SERPL-MCNC: 168 MG/DL
NRBC BLD-RTO: 0 /100 WBC
PLATELET # BLD AUTO: 207 K/UL (ref 150–450)
PMV BLD AUTO: 9.5 FL (ref 9.2–12.9)
POTASSIUM SERPL-SCNC: 4 MMOL/L (ref 3.5–5.1)
PROT SERPL-MCNC: 7.3 G/DL (ref 6–8.4)
RBC # BLD AUTO: 4.08 M/UL (ref 4.6–6.2)
SATURATED IRON: 22 % (ref 20–50)
SODIUM SERPL-SCNC: 140 MMOL/L (ref 136–145)
TOTAL IRON BINDING CAPACITY: 273 UG/DL (ref 250–450)
TRANSFERRIN SERPL-MCNC: 195 MG/DL (ref 200–375)
TRIGL SERPL-MCNC: 68 MG/DL (ref 30–150)
URATE SERPL-MCNC: 7.4 MG/DL (ref 3.4–7)
WBC # BLD AUTO: 7.98 K/UL (ref 3.9–12.7)

## 2024-09-06 PROCEDURE — 85025 COMPLETE CBC W/AUTO DIFF WBC: CPT | Performed by: FAMILY MEDICINE

## 2024-09-06 PROCEDURE — 84153 ASSAY OF PSA TOTAL: CPT | Performed by: FAMILY MEDICINE

## 2024-09-06 PROCEDURE — 82728 ASSAY OF FERRITIN: CPT | Performed by: FAMILY MEDICINE

## 2024-09-06 PROCEDURE — 84550 ASSAY OF BLOOD/URIC ACID: CPT | Performed by: FAMILY MEDICINE

## 2024-09-06 PROCEDURE — 80061 LIPID PANEL: CPT | Performed by: FAMILY MEDICINE

## 2024-09-06 PROCEDURE — 80053 COMPREHEN METABOLIC PANEL: CPT | Performed by: FAMILY MEDICINE

## 2024-09-06 PROCEDURE — 83540 ASSAY OF IRON: CPT | Performed by: FAMILY MEDICINE

## 2024-09-06 PROCEDURE — 36415 COLL VENOUS BLD VENIPUNCTURE: CPT | Performed by: FAMILY MEDICINE

## 2024-09-12 ENCOUNTER — TELEPHONE (OUTPATIENT)
Dept: FAMILY MEDICINE | Facility: CLINIC | Age: 69
End: 2024-09-12
Payer: MEDICARE

## 2024-09-12 NOTE — TELEPHONE ENCOUNTER
----- Message from Abby Barronody sent at 9/12/2024  2:57 PM CDT -----  Regarding: call back  Type: Patient Call Back    Who called: pt    What is the request in detail: requesting call to reschedule canceled appt yesterday, Epic not showing any availability for provider     Can the clinic reply by [facility]? Yes     Would the patient rather a call back or a response via [facility]? message  Best call back number: [phone]     Additional Information:

## 2024-09-18 ENCOUNTER — OFFICE VISIT (OUTPATIENT)
Dept: FAMILY MEDICINE | Facility: CLINIC | Age: 69
End: 2024-09-18
Payer: MEDICARE

## 2024-09-18 VITALS
DIASTOLIC BLOOD PRESSURE: 80 MMHG | BODY MASS INDEX: 26.23 KG/M2 | SYSTOLIC BLOOD PRESSURE: 120 MMHG | HEIGHT: 68 IN | HEART RATE: 64 BPM | OXYGEN SATURATION: 98 % | WEIGHT: 173.06 LBS | RESPIRATION RATE: 19 BRPM | TEMPERATURE: 98 F

## 2024-09-18 DIAGNOSIS — E78.2 MIXED HYPERLIPIDEMIA: ICD-10-CM

## 2024-09-18 DIAGNOSIS — M10.9 GOUT, ARTHRITIS: ICD-10-CM

## 2024-09-18 DIAGNOSIS — I10 ESSENTIAL HYPERTENSION: Primary | ICD-10-CM

## 2024-09-18 DIAGNOSIS — Z23 FLU VACCINE NEED: ICD-10-CM

## 2024-09-18 DIAGNOSIS — B35.1 ONYCHOMYCOSIS: ICD-10-CM

## 2024-09-18 DIAGNOSIS — Z13.6 ENCOUNTER FOR SCREENING FOR CARDIOVASCULAR DISORDERS: ICD-10-CM

## 2024-09-18 DIAGNOSIS — R20.2 LEFT HAND PARESTHESIA: ICD-10-CM

## 2024-09-18 PROCEDURE — G2211 COMPLEX E/M VISIT ADD ON: HCPCS | Mod: S$PBB,,, | Performed by: FAMILY MEDICINE

## 2024-09-18 PROCEDURE — 99999 PR PBB SHADOW E&M-EST. PATIENT-LVL IV: CPT | Mod: PBBFAC,,, | Performed by: FAMILY MEDICINE

## 2024-09-18 PROCEDURE — 99214 OFFICE O/P EST MOD 30 MIN: CPT | Mod: S$PBB,,, | Performed by: FAMILY MEDICINE

## 2024-09-18 PROCEDURE — 99214 OFFICE O/P EST MOD 30 MIN: CPT | Mod: PBBFAC,PO | Performed by: FAMILY MEDICINE

## 2024-09-18 RX ORDER — ATORVASTATIN CALCIUM 40 MG/1
40 TABLET, FILM COATED ORAL DAILY
Qty: 90 TABLET | Refills: 3 | Status: SHIPPED | OUTPATIENT
Start: 2024-09-18 | End: 2025-09-18

## 2024-09-18 RX ORDER — CICLOPIROX 80 MG/ML
SOLUTION TOPICAL NIGHTLY
Qty: 6.6 ML | Status: SHIPPED | OUTPATIENT
Start: 2024-09-18

## 2024-09-18 NOTE — PROGRESS NOTES
Subjective:       Patient ID: Ang Oden Jr. is a 68 y.o. male.    Chief Complaint: Follow-up (6mth f/u)    HPI  Review of Systems   Constitutional:  Negative for fatigue and unexpected weight change.   Respiratory:  Negative for chest tightness and shortness of breath.    Cardiovascular:  Negative for chest pain, palpitations and leg swelling.   Gastrointestinal:  Negative for abdominal pain.   Musculoskeletal:  Negative for arthralgias.   Neurological:  Negative for dizziness, syncope, light-headedness and headaches.       Patient Active Problem List   Diagnosis    Olecranon bursitis    Gout, arthritis    Hyperlipidemia    Anemia, Hgb 13.1    Lumbago due to displacement of intervertebral disc    Male hypogonadism    Fatty liver    KYLEIGH (obstructive sleep apnea), not using CPAP    Kidney calculi    BPH (benign prostatic hypertrophy)    Diverticulosis    Cardiovascular risk factor, FCVRS 5% on Zocor    Insomnia    Anxiety    Allergic rhinitis    Essential hypertension    History of alcohol abuse    Dysphagia    Pre-hypertension    Aortic atherosclerosis    KYLEIGH on CPAP    Normocytic anemia    SURAJ (generalized anxiety disorder)    Scapholunate advanced collapse of left wrist    Left hand pain    Stiffness of left hand joint    Left hand weakness    Effusion of left hand     Patient is here for a chronic conditions follow up.    Reviewed labs 9/2024   Heme/onc mild chronic anemia , nl iron     Derm Dr. Charles treating basal cell CA and AK     Ortho Dr. Silva treating left wrist arthritis. Steroid injections help some. Takes meloxicam prn. C/o lebron wrist pain left > right. 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 S/p carpectomy 4/19/24 left.  Did PT . Still having weakness of , pain in left wrist and hand, numbness in left digits 1-4.  Has also neck stiffness and pain daily   -Uric acid elevated. Rare gout attacks- off allopurinol     Card  Dr. EVELYN sheriff treating KYLEIGH not using CPAP. HPL controlled   Your cholesterol is high.  Your 10 year risk of having a heart attack or a stroke is:The 10-year ASCVD risk score (Brandt COCHRAN, et al., 2019) is: 15.3%    Values used to calculate the score:      Age: 68 years      Sex: Male      Is Non- : No      Diabetic: No      Tobacco smoker: No      Systolic Blood Pressure: 120 mmHg      Is BP treated: No      HDL Cholesterol: 57 mg/dL      Total Cholesterol: 225 mg/dL     urology Dr. Hartmann/raul Alford -[facility] , kidney stone.   Uses flomax only when has kidney stones.  No LUTS from BPH. Has been passing more stones recently.      Spine Chronic intermittent LBP. See chiropractor  Dr. lr     Psych Mood doing well on celexa. Wants to do trial off     GI Colonoscopy Dr. Santiago 2021 1 hyperplastic polyp. On 5 year surveillance  Objective:      Physical Exam  Vitals and nursing note reviewed.   Constitutional:       Appearance: He is well-developed.   Cardiovascular:      Rate and Rhythm: Normal rate and regular rhythm.      Heart sounds: Normal heart sounds.   Pulmonary:      Effort: Pulmonary effort is normal.      Breath sounds: Normal breath sounds.   Musculoskeletal:        Hands:    Skin:     General: Skin is warm and dry.   Neurological:      Mental Status: He is alert and oriented to person, place, and time.         Assessment:       1. Essential hypertension    2. Encounter for screening for cardiovascular disorders    3. Mixed hyperlipidemia    4. Gout, arthritis    5. Flu vaccine need    6. Left hand paresthesia    7. Onychomycosis        Plan:         1. Essential hypertension  Controlled on current medications.  Continue current medications.    - CBC Auto Differential; Future    2. Encounter for screening for cardiovascular disorders  Screen and treat as indicated:    - CT Cardiac Scoring; Future  I recommend starting atorvastatin 40mg to your help lower your cholesterol and risk  of death from cardiovascular events.  I have called a prescription in to your pharmacy.  Statin drugs can cause side effects like muscle pain, liver enzyme elevation and liver inflammation.  In rare instances more serious reactions can occur like severe muscle breakdown called rhabdomyolysis or liver damage.  If you experience muscle pain, pain in the right side of the abdomen or other worsening problems please let me know right away.  We will also monitor your liver enzymes every 3-6 months.    - atorvastatin (LIPITOR) 40 MG tablet; Take 1 tablet (40 mg total) by mouth once daily.  Dispense: 90 tablet; Refill: 3    3. Mixed hyperlipidemia  I recommend a heart healthy diet rich in fiber, fresh vegetables and fruit and low in saturated fats (fried foods, red meat, etc.).  I also recommend regular exercise including a minimum of 150 minutes of cardio exercise per week and 2-30 minute workouts of strength training like light weights, yoga, pilates, etc.  You should work toward a body mass index of < 25.      - CBC Auto Differential; Future  - Comprehensive Metabolic Panel; Future  - Lipid Panel; Future  - atorvastatin (LIPITOR) 40 MG tablet; Take 1 tablet (40 mg total) by mouth once daily.  Dispense: 90 tablet; Refill: 3    4. Gout, arthritis  Counseled on low purine diet and printed on AVS. Screen and treat as indicated:    - CBC Auto Differential; Future  - Uric Acid; Future    5. Flu vaccine need  declined    6. Left hand paresthesia  R/o CTS versus cervical radiculitis . Refer for EMG/NCV  - Ambulatory referral/consult to Physical Medicine Rehab; Future    7. Onychomycosis  Apply to affected nails until healthy nail is grown out  - ciclopirox (PENLAC) 8 % Soln; Apply topically nightly.  Dispense: 6.6 mL; Refill: PRN        Time spent with patient: 20 minutes    Patient with be reevaluated in 6 months or sooner prn    Greater than 50% of this visit was spent counseling as described in above documentation:Yes

## 2024-09-19 ENCOUNTER — HOSPITAL ENCOUNTER (OUTPATIENT)
Dept: RADIOLOGY | Facility: HOSPITAL | Age: 69
Discharge: HOME | End: 2024-09-19
Attending: FAMILY MEDICINE
Payer: MEDICARE

## 2024-09-19 DIAGNOSIS — Z13.6 ENCOUNTER FOR SCREENING FOR CARDIOVASCULAR DISORDERS: ICD-10-CM

## 2024-09-19 PROCEDURE — 75571 CT HRT W/O DYE W/CA TEST: CPT | Mod: 26,,, | Performed by: RADIOLOGY

## 2024-09-19 PROCEDURE — 75571 CT HRT W/O DYE W/CA TEST: CPT | Mod: TC

## 2024-09-21 ENCOUNTER — PATIENT MESSAGE (OUTPATIENT)
Dept: FAMILY MEDICINE | Facility: CLINIC | Age: 69
End: 2024-09-21
Payer: MEDICARE

## 2024-09-25 DIAGNOSIS — R93.1 AGATSTON CORONARY ARTERY CALCIUM SCORE BETWEEN 200 AND 399: Primary | ICD-10-CM

## 2024-09-30 ENCOUNTER — TELEPHONE (OUTPATIENT)
Dept: UROLOGY | Facility: CLINIC | Age: 69
End: 2024-09-30
Payer: MEDICARE

## 2024-09-30 DIAGNOSIS — N20.0 NEPHROLITHIASIS: Primary | ICD-10-CM

## 2024-09-30 NOTE — TELEPHONE ENCOUNTER
Spoke with patient appointment scheduled on 10/28 with imaging prior. Patient verbally voiced understanding.

## 2024-09-30 NOTE — TELEPHONE ENCOUNTER
----- Message from Radha sent at 9/30/2024  3:02 PM CDT -----  Regarding: sooner apt  Contact: patient  Type:  Sooner Appointment Request    Caller is requesting a sooner appointment.  Caller declined first available appointment listed below.  Caller will not accept being placed on the waitlist and is requesting a message be sent to doctor.    Name of Caller:  patient  When is the first available appointment?    Symptoms:  f/u stones and testerone   Would the patient rather a call back or a response via [facility]?   Best Call Back Number:  [phone]    Additional Information:  call to be seen thanks

## 2024-10-01 ENCOUNTER — PATIENT MESSAGE (OUTPATIENT)
Dept: UROLOGY | Facility: CLINIC | Age: 69
End: 2024-10-01
Payer: MEDICARE

## 2024-10-03 RX ORDER — POTASSIUM CITRATE 15 MEQ/1
15 TABLET, EXTENDED RELEASE ORAL 2 TIMES DAILY
Qty: 180 TABLET | Refills: 3 | Status: SHIPPED | OUTPATIENT
Start: 2024-10-03 | End: 2025-10-03

## 2024-10-17 ENCOUNTER — PATIENT MESSAGE (OUTPATIENT)
Dept: FAMILY MEDICINE | Facility: CLINIC | Age: 69
End: 2024-10-17
Payer: MEDICARE

## 2024-10-21 ENCOUNTER — TELEPHONE (OUTPATIENT)
Dept: FAMILY MEDICINE | Facility: CLINIC | Age: 69
End: 2024-10-21
Payer: MEDICARE

## 2024-10-21 ENCOUNTER — OFFICE VISIT (OUTPATIENT)
Dept: SPINE | Facility: CLINIC | Age: 69
End: 2024-10-21
Payer: MEDICARE

## 2024-10-21 ENCOUNTER — HOSPITAL ENCOUNTER (OUTPATIENT)
Dept: RADIOLOGY | Facility: HOSPITAL | Age: 69
Discharge: HOME | End: 2024-10-21
Attending: UROLOGY
Payer: MEDICARE

## 2024-10-21 ENCOUNTER — OFFICE VISIT (OUTPATIENT)
Dept: CARDIOLOGY | Facility: CLINIC | Age: 69
End: 2024-10-21
Payer: MEDICARE

## 2024-10-21 VITALS — BODY MASS INDEX: 27.39 KG/M2 | HEIGHT: 68 IN | WEIGHT: 180.75 LBS

## 2024-10-21 VITALS
WEIGHT: 180.75 LBS | HEART RATE: 59 BPM | SYSTOLIC BLOOD PRESSURE: 148 MMHG | HEIGHT: 68 IN | DIASTOLIC BLOOD PRESSURE: 89 MMHG | BODY MASS INDEX: 27.39 KG/M2

## 2024-10-21 DIAGNOSIS — M54.2 CERVICALGIA: ICD-10-CM

## 2024-10-21 DIAGNOSIS — G56.02 CARPAL TUNNEL SYNDROME OF LEFT WRIST: ICD-10-CM

## 2024-10-21 DIAGNOSIS — G89.29 CHRONIC BILATERAL LOW BACK PAIN WITHOUT SCIATICA: Primary | ICD-10-CM

## 2024-10-21 DIAGNOSIS — N20.0 NEPHROLITHIASIS: ICD-10-CM

## 2024-10-21 DIAGNOSIS — M54.9 BACK PAIN, UNSPECIFIED BACK LOCATION, UNSPECIFIED BACK PAIN LATERALITY, UNSPECIFIED CHRONICITY: Primary | ICD-10-CM

## 2024-10-21 DIAGNOSIS — R10.9 ABDOMINAL PAIN, UNSPECIFIED ABDOMINAL LOCATION: ICD-10-CM

## 2024-10-21 DIAGNOSIS — R93.1 AGATSTON CORONARY ARTERY CALCIUM SCORE BETWEEN 200 AND 399: Primary | ICD-10-CM

## 2024-10-21 DIAGNOSIS — M54.50 CHRONIC BILATERAL LOW BACK PAIN WITHOUT SCIATICA: Primary | ICD-10-CM

## 2024-10-21 DIAGNOSIS — M54.9 BACK PAIN, UNSPECIFIED BACK LOCATION, UNSPECIFIED BACK PAIN LATERALITY, UNSPECIFIED CHRONICITY: ICD-10-CM

## 2024-10-21 DIAGNOSIS — M54.9 DORSALGIA, UNSPECIFIED: ICD-10-CM

## 2024-10-21 DIAGNOSIS — E78.5 HYPERLIPIDEMIA, UNSPECIFIED HYPERLIPIDEMIA TYPE: ICD-10-CM

## 2024-10-21 DIAGNOSIS — R10.9 ABDOMINAL PAIN, UNSPECIFIED ABDOMINAL LOCATION: Primary | ICD-10-CM

## 2024-10-21 DIAGNOSIS — I10 ESSENTIAL HYPERTENSION: ICD-10-CM

## 2024-10-21 PROCEDURE — 76770 US EXAM ABDO BACK WALL COMP: CPT | Mod: TC

## 2024-10-21 PROCEDURE — 76770 US EXAM ABDO BACK WALL COMP: CPT | Mod: 26,,, | Performed by: RADIOLOGY

## 2024-10-21 PROCEDURE — 74176 CT ABD & PELVIS W/O CONTRAST: CPT | Mod: TC

## 2024-10-21 PROCEDURE — 74176 CT ABD & PELVIS W/O CONTRAST: CPT | Mod: 26,,, | Performed by: RADIOLOGY

## 2024-10-21 PROCEDURE — 99999 PR PBB SHADOW E&M-EST. PATIENT-LVL III: CPT | Mod: PBBFAC,,, | Performed by: INTERNAL MEDICINE

## 2024-10-21 PROCEDURE — 74018 RADEX ABDOMEN 1 VIEW: CPT | Mod: 26,,, | Performed by: RADIOLOGY

## 2024-10-21 PROCEDURE — 99214 OFFICE O/P EST MOD 30 MIN: CPT | Mod: PBBFAC,27,PN | Performed by: PHYSICAL MEDICINE & REHABILITATION

## 2024-10-21 PROCEDURE — 99204 OFFICE O/P NEW MOD 45 MIN: CPT | Mod: S$PBB,,, | Performed by: PHYSICAL MEDICINE & REHABILITATION

## 2024-10-21 PROCEDURE — 74018 RADEX ABDOMEN 1 VIEW: CPT | Mod: TC

## 2024-10-21 PROCEDURE — 99999 PR PBB SHADOW E&M-EST. PATIENT-LVL IV: CPT | Mod: PBBFAC,,, | Performed by: PHYSICAL MEDICINE & REHABILITATION

## 2024-10-21 PROCEDURE — 99213 OFFICE O/P EST LOW 20 MIN: CPT | Mod: PBBFAC,PN | Performed by: INTERNAL MEDICINE

## 2024-10-21 PROCEDURE — 99214 OFFICE O/P EST MOD 30 MIN: CPT | Mod: S$PBB,,, | Performed by: INTERNAL MEDICINE

## 2024-10-21 RX ORDER — MELOXICAM 15 MG/1
15 TABLET ORAL DAILY
Qty: 30 TABLET | Refills: 0 | Status: SHIPPED | OUTPATIENT
Start: 2024-10-21

## 2024-10-21 RX ORDER — GABAPENTIN 300 MG/1
300 CAPSULE ORAL NIGHTLY
Qty: 30 CAPSULE | Refills: 1 | Status: SHIPPED | OUTPATIENT
Start: 2024-10-21

## 2024-10-21 NOTE — PROGRESS NOTES
Please let pt know he may have an obstructing stone.   Ask him to do a lab ua reflex, ct rss and bmp   Does he want to see me Thursday at 130pm with the tests above  Virtual ok too if he does all of the above

## 2024-10-21 NOTE — PROGRESS NOTES
SUBJECTIVE:    Patient ID: Ang Oden Jr. is a 68 y.o. male.    Chief Complaint: Low-back Pain and Neck Pain    This is a 68-year-old man who sees Dr. Wright for his primary care.  History of hyperlipidemia otherwise denies any chronic major medical problems.  No cancer history.  Long history of neck and low back pain.  He has never had any specific treatment for these issues.  He underwent partial scaphoid excision and radial styloid excision on 04/1924 with Dr. Michel cook.  Not immediately but shortly thereafter he developed numbness and tingling in digits 1 through 4 of the left hand that has persisted.  Sometimes wakes him up at night.  Symptoms occasionally radiates to the elbow but do not seem to originate from the neck.  Presents to me with a long history of posterior neck discomfort without radicular symptoms.  Low back pain is at the lumbosacral junction again without radicular symptoms.  No bowel or bladder dysfunction fever chills sweats or unexpected weight loss.  I personally reviewed a CT of the abdomen and pelvis done 10/18/2022 which shows expected degenerative changes that are worst at L5-S1.  His pain level is 6/10 and interferes with quality of life in terms of activities daily living recreation and social activities.  I note he is scheduled to have EMG and nerve conduction studies with Dr. Cruz later this month.          Past Medical History:   Diagnosis Date    Anemia     Anticoagulant long-term use     Chronic kidney disease     Chronic rhinitis     Disc     lower back    DJD (degenerative joint disease)     Foot pain     left foot    Gout, unspecified     HBP (high blood pressure)     High cholesterol     Kidney stone     Sleep apnea     Weight loss     15 pounds in two months     Social History     Socioeconomic History    Marital status:    Tobacco Use    Smoking status: Former     Current packs/day: 0.00     Types: Cigarettes     Quit date: 9/10/1982     Years since  quittin.1    Smokeless tobacco: Never   Substance and Sexual Activity    Alcohol use: Not Currently    Drug use: Yes     Types: Marijuana     Comment: occassioanlly, had one last week     Social Drivers of Health     Financial Resource Strain: Patient Declined (3/4/2024)    Overall Financial Resource Strain (CARDIA)     Difficulty of Paying Living Expenses: Patient declined   Food Insecurity: Patient Declined (3/4/2024)    Hunger Vital Sign     Worried About Running Out of Food in the Last Year: Patient declined     Ran Out of Food in the Last Year: Patient declined   Transportation Needs: Patient Declined (3/4/2024)    PRAPARE - Transportation     Lack of Transportation (Medical): Patient declined     Lack of Transportation (Non-Medical): Patient declined   Physical Activity: Sufficiently Active (3/4/2024)    Exercise Vital Sign     Days of Exercise per Week: 7 days     Minutes of Exercise per Session: 30 min   Stress: Stress Concern Present (3/4/2024)    [employer] [place] [employer] - Occupational Stress Questionnaire     Feeling of Stress : To some extent   Housing Stability: Patient Declined (3/4/2024)    Housing Stability Vital Sign     Unable to Pay for Housing in the Last Year: Patient declined     Number of Places Lived in the Last Year: 1     Unstable Housing in the Last Year: Patient declined     Past Surgical History:   Procedure Laterality Date    APPENDECTOMY      CARPECTOMY Left 2024    Procedure: CARPECTOMY - left radial styloidectomy and partial scaphoid excision;  Surgeon: Allen Silva MD;  Location: [facility];  Service: Orthopedics;  Laterality: Left;    COLONOSCOPY N/A 2021    Procedure: COLONOSCOPY;  Surgeon: Christian Santiago MD;  Location: [facility];  Service: Endoscopy;  Laterality: N/A;    ELBOW SURGERY      REMOVAL  2024    Procedure: REMOVAL, STYLOID BONE;  Surgeon: Allen Silva MD;  Location: [facility];  Service: Orthopedics;;    VASECTOMY       Family  "History   Problem Relation Name Age of Onset    Cancer Mother      Heart disease Mother      Heart failure Mother      Liver disease Father          secondary to alcohol    Diabetes Maternal Grandmother      Allergies Son      Asthma Son      Urolithiasis Neg Hx      Prostate cancer Neg Hx      Kidney cancer Neg Hx      Eczema Neg Hx      Immunodeficiency Neg Hx      Angioedema Neg Hx      Allergic rhinitis Neg Hx      Atopy Neg Hx      Rhinitis Neg Hx      Urticaria Neg Hx       Vitals:    10/21/24 1350   Weight: 82 kg (180 lb 12.4 oz)   Height: 5' 8" (1.727 m)       Review of Systems   Constitutional:  Negative for chills, diaphoresis, fatigue, fever and unexpected weight change.   HENT:  Negative for trouble swallowing.    Eyes:  Negative for visual disturbance.   Respiratory:  Negative for shortness of breath.    Cardiovascular:  Negative for chest pain.   Gastrointestinal:  Negative for abdominal pain, constipation, diarrhea, nausea and vomiting.   Genitourinary:  Negative for difficulty urinating.   Musculoskeletal:  Negative for arthralgias, back pain, gait problem, joint swelling, myalgias, neck pain and neck stiffness.   Neurological:  Negative for dizziness, speech difficulty, weakness, light-headedness, numbness and headaches.          Objective:      Physical Exam  Neurological:      Mental Status: He is alert and oriented to person, place, and time.      Comments: He is awake and in no acute distress  No point tenderness external lesions or palpable masses about cervical spine  Cervical range of motion is limited in rotation to the right and left and extension with pain at the endpoints of his range in those planes.  Mild tenderness to palpation lumbar paraspinous musculature at the lumbosacral junction with no palpable masses.  Forward flexion is to about 60° before complaint of pain at the lumbosacral junction.  Extension at 10° causes mild pain at the lumbosacral junction  He can heel and toe walk " normally  Reflexes- +1-+2 reflexes at the following:   C5-Biceps   C6-Brachioradialis   C7-Triceps   L3/4-Patellar   S1-Achilles   Kailey sign is negative bilaterally  Tinel sign positive at the left wrist and negative at the right  Strength testing- 5/5 strength in the following muscle groups:  C5-Elbow flexion  C6-Wrist extension  C7-Elbow extension  C8-Finger flexion  T1-Finger abduction  L2-Hip flexion  L3-Knee extension  L4-Ankle dorsiflexion  L5-Great toe extension  S1-Ankle plantar flexion                    Assessment:       1. Chronic bilateral low back pain without sciatica    2. Back pain, unspecified back location, unspecified back pain laterality, unspecified chronicity    3. Cervicalgia    4. Carpal tunnel syndrome of left wrist    5. Dorsalgia, unspecified           Plan:     He has a nonfocal neurological examination and no historical red flags.  I suspect he has neck and low back pain on basis of degenerative disc disease and facet arthropathy.  Has pain with facet loading.  He is frustrated with his ongoing pain that significantly interferes with his quality of life.  I also suspect he has left carpal tunnel syndrome.  Get an MRI of the cervical spine and lumbar spine.  He may be a candidate for medial branch blocks and subsequent radiofrequency ablation.  Follow up with me after the scan      Chronic bilateral low back pain without sciatica    Back pain, unspecified back location, unspecified back pain laterality, unspecified chronicity  -     [facility] referral[facility]    Cervicalgia  -     MRI Cervical Spine Without Contrast; Future; Expected date: 10/21/2024    Carpal tunnel syndrome of left wrist    Dorsalgia, unspecified  -     MRI Lumbar Spine Without Contrast; Future; Expected date: 10/21/2024    Other orders  -     gabapentin (NEURONTIN) 300 MG capsule; Take 1 capsule (300 mg total) by mouth every evening.  Dispense: 30 capsule; Refill: 1  -     meloxicam (MOBIC)  15 MG tablet; Take 1 tablet (15 mg total) by mouth once daily.  Dispense: 30 tablet; Refill: 0

## 2024-10-21 NOTE — PROGRESS NOTES
Subjective:    Patient ID:  Ang Oden Jr. is a 68 y.o. male patient here for evaluation Results, Hypertension, and Hyperlipidemia      History of Present Illness:     68-year-old male came here for clinic follow up.  This patient is new to me.  He was following up with Dr. Mejia in the past.  He recently had CT calcium score which was elevated and was referred by primary care for evaluation.  His cholesterol was elevated on recent blood work.  Patient ran out of the statin for about a year prior to this blood test.  Recently restarted statin again.  He has history of hypertension however he is not on any medication currently and blood pressure is well controlled at home.  He is checking the blood pressure regularly at home.  He is physically active at baseline and denies any exertional chest pain or dyspnea.    Review of patient's allergies indicates:   Allergen Reactions    No known drug allergies        Past Medical History:   Diagnosis Date    Anemia     Anticoagulant long-term use     Chronic kidney disease     Chronic rhinitis     Disc     lower back    DJD (degenerative joint disease)     Foot pain     left foot    Gout, unspecified     HBP (high blood pressure)     High cholesterol     Kidney stone     Sleep apnea     Weight loss     15 pounds in two months     Past Surgical History:   Procedure Laterality Date    APPENDECTOMY      CARPECTOMY Left 4/19/2024    Procedure: CARPECTOMY - left radial styloidectomy and partial scaphoid excision;  Surgeon: Allen Silva MD;  Location: [facility];  Service: Orthopedics;  Laterality: Left;    COLONOSCOPY N/A 11/11/2021    Procedure: COLONOSCOPY;  Surgeon: Christian Santiago MD;  Location: [facility];  Service: Endoscopy;  Laterality: N/A;    ELBOW SURGERY      REMOVAL  4/19/2024    Procedure: REMOVAL, STYLOID BONE;  Surgeon: Allen Silva MD;  Location: [facility] OR;  Service: Orthopedics;;    VASECTOMY       Social History     Tobacco Use    Smoking status:  Former     Current packs/day: 0.00     Types: Cigarettes     Quit date: 9/10/1982     Years since quittin.1    Smokeless tobacco: Never   Substance Use Topics    Alcohol use: Not Currently    Drug use: Yes     Types: Marijuana     Comment: occassioanlly, had one last week        Review of Systems   Negative except as mentioned in HPI         Objective        Vitals:    10/21/24 1038   BP: (!) 148/89   Pulse: (!) 59       LIPIDS - LAST 2   Lab Results   Component Value Date    CHOL 225 (H) 2024    CHOL 171 2023    HDL 57 2024    HDL 49 2023    LDLCALC 154.4 2024    LDLCALC 98.2 2023    TRIG 68 2024    TRIG 119 2023    CHOLHDL 25.3 2024    CHOLHDL 28.7 2023       CBC - LAST 2  Lab Results   Component Value Date    WBC 7.98 2024    WBC 7.29 2023    RBC 4.08 (L) 2024    RBC 4.18 (L) 2023    HGB 12.8 (L) 2024    HGB 12.7 (L) 2023    HCT 38.3 (L) 2024    HCT 39.2 (L) 2023    MCV 94 2024    MCV 94 2023    MCH 31.4 (H) 2024    MCH 30.4 2023    MCHC 33.4 2024    MCHC 32.4 2023    RDW 13.7 2024    RDW 13.3 2023     2024     2023    MPV 9.5 2024    MPV 10.9 2023    GRAN 5.3 2024    GRAN 65.9 2024    LYMPH 1.4 2024    LYMPH 17.4 (L) 2024    MONO 0.9 2024    MONO 11.5 2024    BASO 0.03 2024    BASO 0.03 2023    NRBC 0 2024    NRBC 0 2023       CHEMISTRY & LIVER FUNCTION - LAST 2  Lab Results   Component Value Date     2024     2023    K 4.0 2024    K 3.9 2023     2024     2023    CO2 23 2024    CO2 23 2023    ANIONGAP 11 2024    ANIONGAP 12 2023    BUN 14 2024    BUN 13 2023    CREATININE 0.9 2024    CREATININE 1.1 2023    GLU 97 2024    GLU 90 2023     CALCIUM 9.4 09/06/2024    CALCIUM 9.3 09/01/2023    ALBUMIN 3.7 09/06/2024    ALBUMIN 4.0 03/25/2024    PROT 7.3 09/06/2024    PROT 7.1 09/01/2023    ALKPHOS 53 (L) 09/06/2024    ALKPHOS 57 09/01/2023    ALT 22 09/06/2024    ALT 19 09/01/2023    AST 26 09/06/2024    AST 25 09/01/2023    BILITOT 1.2 (H) 09/06/2024    BILITOT 1.0 09/01/2023        CARDIAC PROFILE - LAST 2  Lab Results   Component Value Date     11/19/2012        COAGULATION - LAST 2  Lab Results   Component Value Date    INR 1.0 09/07/2013    APTT 25.8 09/07/2013       ENDOCRINE & PSA - LAST 2  Lab Results   Component Value Date    HGBA1C 5.1 09/07/2023    HGBA1C 5.4 07/26/2016    TSH 1.408 07/26/2016    TSH 1.842 11/04/2015    PSA 0.55 09/06/2024    PSA 0.71 03/22/2024        ECHOCARDIOGRAM RESULTS  Results for orders placed during the hospital encounter of 02/08/21    Echo Color Flow Doppler? Yes    Interpretation Summary  · The left ventricle is normal in size with normal systolic function. The estimated ejection fraction is 60%  · Normal left ventricular diastolic function.  · Normal right ventricular size with normal right ventricular systolic function.  · Mild tricuspid regurgitation.      CURRENT/PREVIOUS VISIT EKG  Results for orders placed or performed in visit on 07/07/22   IN OFFICE EKG 12-LEAD (to [place])    Collection Time: 07/07/22  4:52 PM    Narrative    Test Reason : R94.31,    Vent. Rate : 054 BPM     Atrial Rate : 054 BPM     P-R Int : 194 ms          QRS Dur : 094 ms      QT Int : 480 ms       P-R-T Axes : 066 013 047 degrees     QTc Int : 455 ms    Sinus bradycardia  Otherwise normal ECG  When compared with ECG of 25-AUG-2021 13:22,  No significant change was found  Confirmed by Elian Mejia MD ([id number]) on 7/18/2022 8:31:56 PM    Referred By:             Confirmed By:Elian Mejia MD     No valid procedures specified.   Results for orders placed during the hospital encounter of 06/25/19    Nuc Tread Stress test - Radiologist  interpreted    Interpretation Summary    The EKG portion of this study is negative for ischemia.    Arrhythmias during stress: rare PVCs.    The patient reported no chest pain during the stress test.    No valid procedures specified.          PREVIOUS STRESS TEST              PREVIOUS ANGIOGRAM        PHYSICAL EXAM    CONSTITUTIONAL: Well built, well nourished in no apparent distress  HEENT: No pallor  NECK: no JVD  LUNGS: CTA b/l  HEART: regular rate and rhythm, S1, S2 normal, no murmur   ABDOMEN:  Nondistended  EXTREMITIES: No edema  NEURO: AAO X 3   Psych:  Normal affect    I HAVE REVIEWED :    The vital signs, nurses notes, and all the pertinent radiology and labs.        Current Outpatient Medications   Medication Instructions    aspirin (ECOTRIN) 81 MG EC tablet Take by mouth. 1 Tablet, Delayed Release (E.C.) Oral Every day    atorvastatin (LIPITOR) 40 mg, Oral, Daily    azelastine (ASTELIN) 137 mcg (0.1 %) nasal spray SPRAY 1 SPRAY IN EACH NOSTRIL 2 TIMES A DAY    ciclopirox (PENLAC) 8 % Soln Topical (Top), Nightly    citalopram (CELEXA) 20 mg, Oral, Daily    fluticasone propionate (FLONASE) 50 mcg/actuation nasal spray 1 SPRAY (50 MCG TOTAL) BY EACH NOSTRIL ROUTE ONCE DAILY.    mometasone 0.1% (ELOCON) 0.1 % cream Topical (Top), 2 times daily PRN    multivitamin (THERAGRAN) per tablet 1 tablet, Oral, Daily    potassium citrate (UROCIT-K 15) 15 mEq TbSR 15 mEq, Oral, 2 times daily, Take twice daily for stone prevention. Will see in still    tamsulosin (FLOMAX) 0.8 mg, Oral, Daily, Take 1 nightly to help pass stone    traZODone (DESYREL) 50 mg, Oral        ECG reviewed by me: Sinus rhythm, Pac  Assessment & Plan     Elevated CT calcium score- 251.  Progressed from prior calcium score study 4 years ago.  Had cardiac workup in the past.  Did not take statin for last 1 year. He is physically active and denies any exertional angina.  Continue medical management.  Recently restarted statin.  Continue with  atorvastatin 40 mg daily.  Repeat lipid profile and LFTs in 2-3 months already ordered by PCP.    Hypertension-not on medications.  Managed with lifestyle modifications.  Blood pressure well controlled at home.  Continue home BP monitoring.  Low-sodium diet.    Dyslipidemia-  recently restarted statin as mentioned above.  Low-fat diet.      Follow up in about 6 months (around 4/21/2025).

## 2024-10-22 LAB
OHS QRS DURATION: 84 MS
OHS QTC CALCULATION: 466 MS

## 2024-10-24 ENCOUNTER — OFFICE VISIT (OUTPATIENT)
Dept: UROLOGY | Facility: CLINIC | Age: 69
End: 2024-10-24
Payer: MEDICARE

## 2024-10-24 ENCOUNTER — PATIENT MESSAGE (OUTPATIENT)
Dept: UROLOGY | Facility: CLINIC | Age: 69
End: 2024-10-24

## 2024-10-24 VITALS — WEIGHT: 180.75 LBS | HEIGHT: 68 IN | BODY MASS INDEX: 27.39 KG/M2

## 2024-10-24 DIAGNOSIS — E29.1 HYPOGONADISM IN MALE: ICD-10-CM

## 2024-10-24 DIAGNOSIS — N22 CALCULUS OF URINARY TRACT IN DISEASES CLASSIFIED ELSEWHERE: ICD-10-CM

## 2024-10-24 DIAGNOSIS — N20.0 NEPHROLITHIASIS: Primary | ICD-10-CM

## 2024-10-24 LAB
BACTERIA #/AREA URNS AUTO: ABNORMAL /HPF
BILIRUBIN, UA POC OHS: NEGATIVE
BLOOD, UA POC OHS: NEGATIVE
CLARITY, UA POC OHS: CLEAR
COLOR, UA POC OHS: YELLOW
GLUCOSE, UA POC OHS: NEGATIVE
KETONES, UA POC OHS: ABNORMAL
LEUKOCYTES, UA POC OHS: ABNORMAL
MICROSCOPIC COMMENT: ABNORMAL
NITRITE, UA POC OHS: NEGATIVE
PH, UA POC OHS: 7
PROTEIN, UA POC OHS: NEGATIVE
RBC #/AREA URNS AUTO: 2 /HPF (ref 0–4)
SPECIFIC GRAVITY, UA POC OHS: 1.02
UROBILINOGEN, UA POC OHS: 0.2
WBC #/AREA URNS AUTO: 8 /HPF (ref 0–5)

## 2024-10-24 PROCEDURE — 99214 OFFICE O/P EST MOD 30 MIN: CPT | Mod: S$PBB,,, | Performed by: UROLOGY

## 2024-10-24 PROCEDURE — G2211 COMPLEX E/M VISIT ADD ON: HCPCS | Mod: S$PBB,,, | Performed by: UROLOGY

## 2024-10-24 PROCEDURE — 81003 URINALYSIS AUTO W/O SCOPE: CPT | Mod: PBBFAC,PO | Performed by: UROLOGY

## 2024-10-24 PROCEDURE — 81001 URINALYSIS AUTO W/SCOPE: CPT | Performed by: UROLOGY

## 2024-10-24 PROCEDURE — 99999 PR PBB SHADOW E&M-EST. PATIENT-LVL IV: CPT | Mod: PBBFAC,,, | Performed by: UROLOGY

## 2024-10-24 PROCEDURE — 99999PBSHW POCT URINALYSIS(INSTRUMENT): Mod: PBBFAC,,,

## 2024-10-24 PROCEDURE — 99214 OFFICE O/P EST MOD 30 MIN: CPT | Mod: PBBFAC,PO | Performed by: UROLOGY

## 2024-10-24 PROCEDURE — 87086 URINE CULTURE/COLONY COUNT: CPT | Performed by: UROLOGY

## 2024-10-24 RX ORDER — TRAMADOL HYDROCHLORIDE 50 MG/1
50 TABLET ORAL EVERY 6 HOURS PRN
Qty: 10 TABLET | Refills: 0 | Status: SHIPPED | OUTPATIENT
Start: 2024-10-24

## 2024-10-24 RX ORDER — DOXYCYCLINE 100 MG/1
100 CAPSULE ORAL EVERY 12 HOURS
Qty: 14 CAPSULE | Refills: 0 | Status: SHIPPED | OUTPATIENT
Start: 2024-10-24 | End: 2024-10-31

## 2024-10-24 RX ORDER — KETOROLAC TROMETHAMINE 10 MG/1
10 TABLET, FILM COATED ORAL EVERY 6 HOURS PRN
Qty: 10 TABLET | Refills: 0 | Status: SHIPPED | OUTPATIENT
Start: 2024-10-24 | End: 2024-10-29

## 2024-10-24 NOTE — PROGRESS NOTES
[facility], formerly known as [facility]  Group MD's:Rocío/Mary Kate/Salbador/Arden/Yessi  Group NP's: Angelica Siegel, Karey Clements    PCP: Jordana Anderson MD  Date of Service: 10/24/2024  Today's note written by: MD Rocío    CHIEF COMPLAINT:    Mr. Oden is a 68 y.o. male presenting for kidney stones  PRESENTING ILLNESS:    Ang Oden Jr. is a 68 y.o. male with a PMH of BPH, kidney stones, HTN, Gout who presents for kidney stones. Last clinic visit was 1/19/21 with Dr. Hartmann.  Ctrss 2/17/19- multiple B renal stones  Ctrss 1/7/20- had multiple R renal stones. A few left renal stones.   Ctrss 11/20/20 -only 1 right renal stone. A few left renal stones.   Patient was seen in ED 7/7/22 for right flank pain. Patient reported that he passed a small stone that morning. Discharged home with norco, zofran, ketorolac, and flomax. UA negative  CT RSS 7/7/22: The kidneys are of normal size contour and CT density for a noncontrast study.  On the right or 10 intrarenal calcifications consistent with stones.  The largest measures 6 mm.  Hydronephrosis or ureteral stone is not seen.  On the left are 12 intrarenal calcifications.  The largest measures 4 mm.  Hydronephrosis or ureteral stone is not seen on this side.  The bladder is of normal contour without mass or asymmetry  7/14/22 visit with Karey patient presents to clinic for f/u kidney stones. Patient reports he has passed a few stones since ED visit. Only symptom is flank pain. Denies dysuria, gross hematuria, fever, chills, nausea or vomiting. Denies difficulty voiding. He takes flomax when he is passing stone and needs refill. He also request refill for pain medication. His previous urologist would give him pain medication and flomax to keep on hand if needed for stones. He was not able to collect any stones that he recently passed.  Patient reports he does pass stones very often and has not ever needed surgery to  remove stones.   Drinks: does not drink as much water as he should  3/14/19 Stone analysis resulted 100% calcium oxalate stone  He does have hx of Gout but no uric acid stone that patient is aware of. No recent Gout attacks since he stopped drinking alcohol.    Interval history 8/16/22:  He was c/o pain and so she sent him for ct. Ct essentially unchanged. No ureteral stones, no hydro. However continues to pass small fragments. Does ride a motorcycle about 4x a week. Stones 8/2: 100% calcium oxalate. Ca on last bmp 7/7/22 9.2  He has had stones for 10 to 15 years or more. No family hx. Review of ct in 2020 shows fewer stones. Doesn't appear to have done a 24 hour urine in the charts but thinks he's done before, parathyroid hormone or has never been a medication for kidney stones. He's passed stones as big as a qtip. Meds: no topamax, vit D or calcium.   Only takes flomax as needed although ct shows stones in prostate and intravesical portion of prostate. Otherwise no problems with urinating.   ua today is negative    ctrss 7/21/22:          Interval history 10/18/22:  Here to discuss 24 hour urine done 8/25/22. Shows low UOP: 1.03L and low citrate 284. Pth 46.8. his citrate in 2019 was normal at 689 and his ct then in 2019 was the same as ct now but ct in 2020 actually showed less stones in R kidney. He had low uop then as well. Says he doesn't like drinking too much fluids, causes him to bloat.           He said he's passed stones about 3x a week since I last saw him. He says he only likes to take flomax when he needs to- if he's passing a stone, but he's been taking every day for the last 2 months bc he's been passing stones.  He says he notices a stronger flow but wants to stop the medication if he can bc he doesn't like to take meds.   Largest stone he has passed is  about 6mm  He said he's still having R flank pain that is constant but worse when he's about to pass another stone. The right flank pain has never  gone away since his ct in august. No images since august 2022.  Gave him toradol last visit. He says didn't help with pain.     Interval history by ME in CLINIC on 10/5/23 for kidney stones:  Had him do a PSA and it was 0.68 on 09/01/2023.  Same exact as last year on 07/21/2022.  Denies any difficulty urinating except for when he is trying to pass stones.    He says he passed a stone last week, about 2 mm.  A few months ago he passed stones daily for a month.  Right flank pain radiating to his right groin.  He is about to run out of Flomax.  His urine today shows trace blood and trace leukocyte.  No fevers or chills.    Review of his CT from last year showed that he would 10+ stones on each side, the largest measuring about 5 mm in the right.  His recent KUB (Xray of the abdomen to look for stones) on 09/27/2023 showed bilateral small stones.  None appearing in the ureter.  Did not get an ultrasound yet.    Has not really been purposely trying to increase citrate intake.  We discussed again today    Ctrss 10/18/22        Kub 9/27/23      Interval history by ME/ in CLINIC on 10/24/24:  History of Present Illness    CHIEF COMPLAINT:  Ang presents today for testosterone evaluation and kidney stone follow-up.    KIDNEY STONES:  A CT on October 21st showed slight left-sided hydronephrosis secondary to left proximal 4mm stone, 13 stones on the left (largest 4mm), and 8 stones on the right (largest 8mm).He has a current experiencing pain rated at 6.5/10 in the left side near the bladder. He reports increased urination frequency but denies dysuria, fevers, chills, hematuria, or cloudy urine. He has a history of multiple kidney stones bilaterally, having passed 5-7 small stones in the past year with associated pain. Urine today with small leuk and tr bld. No uti sx. No fevers or chills.     MEDICATIONS:  He started Flomax as prescribed for stone passage (not bph) and takes potassium citrate daily, though he  was instructed to take it twice daily but often forgets the second dose.    DIETARY HABITS:  He reports regular soda consumption, acknowledging it may contribute to dehydration and exacerbate his condition.    MEDICAL HISTORY:  He is borderline anemic, currently being evaluated by his primary care physician. He has a history of C. diff infection in 2011. He denies diabetes and is not on any blood thinners.    LOW TESTOSTERONE:  He has a history of low testosterone. Two tests performed after 8:00 a.m. showed low levels. A third test before 8:00 a.m. resulted in a level of 372, which is above the diagnostic threshold of 300. He is aware that testosterone testing should be performed before 8:00 a.m. for accurate results.    Only pertinent history related to their urologic issues were discussed above.   Rbus 10/21/24: read says not hydro but independent review shows possible hydro        Ctrss 10/21/24: b stones. 13 left, 8 right. Porximal 5mm left stone with mild hydro. Confirmed with ind review                  Urine history: family history of kidney, bladder or prostate cancer:No, personal or family history of kidney stones: Yes - personal, no family ,tobacco use: Yes - quit 30 yrs ago -smoked for 10 years, anticoagulation: No   10/24/24 Small leuk/tr ket- no sx send for ua josué and culture  10/21/24 3+bld, >100 rbc  10/5/23 Tr leuk/tr bld  10/18/22 Neg   8/16/22 Neg  7/14/22 Neg  7/7/22  Neg  7/14/20 Neg  1/7/20  Ng, void: tr bld  2/17/19 2+bld  6/28/16 2+bld  11/4/15 Neg  9/25/15 3+bld  11/16/11 Neg     PSA History: no fam hx of prostate cancer\  9/6/24 0.55  9/1/23 0.68  Component PSA, Screen PSA Diagnostic   Latest Ref Rng & Units 0.00 - 4.00 ng/mL 0.00 - 4.00 ng/mL   7/21/2022 0.68    1/19/2021  0.83   1/7/2020  0.85   12/7/2018 0.46    3/8/2017 0.40    11/4/2015 0.43    1/30/2014 0.38    11/3/2012 0.56    1/7/2012 0.52    12/28/2010 0.36      REVIEW OF SYSTEMS:  As above    PHYSICAL EXAMINATION:  There were no  vitals filed for this visit.     exam as above     Recent Labs   Lab 09/01/23  0613 09/07/23  1510 09/06/24  1710   WBC 7.31 7.29 7.98   Hemoglobin 12.9 L 12.7 L 12.8 L   Hematocrit 38.0 L 39.2 L 38.3 L   Platelets 199 210 207   ]  Recent Labs   Lab 08/30/22  0739 09/01/23  0613 03/25/24  1554 09/06/24  1710 10/21/24  1434   Sodium 143 145  --  140 141   Potassium 4.0 3.9  --  4.0 4.0   Chloride 110 110  --  106 107   CO2 25 23  --  23 23   BUN 21 13  --  14 10   Creatinine 1.0 1.1  --  0.9 0.9   Glucose 90 90  --  97 94   Calcium 9.4 9.3  --  9.4 9.2   Alkaline Phosphatase 53 L 57  --  53 L  --    Total Protein 6.8 7.1  --  7.3  --    Albumin 3.8 3.9 4.0 3.7  --    Total Bilirubin 1.0 1.0  --  1.2 H  --    AST 23 25  --  26  --    ALT 22 19  --  22  --    ]      IMPRESSION:    Ang TANISHA Oden Jr. is a 68 y.o. male    Encounter Diagnoses   Name Primary?    Nephrolithiasis Yes    Calculus of urinary tract in diseases classified elsewhere     Hypogonadism in male          Plan:  Abbreviated/Short Plan:  Trial of passage- tends to pass stones.   Sent toradol and tramadol. Alternate for pain  Sent in doxyccyline abx - BID x 7d. But dc if culture neg bc of h/o cdif.   Sent urine (small leuk but no uti sx) for ua josué and culture  Recommend repeat 24 hour urine on k cit (taking one a day) in a few months.   Fu in 3-4 months with urology np to review 24 hour urine. In meantime increase citrate and fluid intake.  Schedule ctrss in 6 weeks with ua, bmp prior if stone doesn't pass. Cancel if stone passes and fu after ct. Telephone visit after ct, urine and blood test to decide on plan.   Morning testosterone tomorrow before 8am and if <300 then repeat another one on Monday. Then fu next thurs for telephone or virtual visit. (Already had psa and cbc recently). If T>300 then cancel appt and cancel repeat T      The following assessment plan was created by [employer] via ambient listening:  Assessment & Plan    IMPRESSION:  -  Patient has multiple kidney stones: 13 on left, 8 on right; largest 8mm on right side  - 4mm stone in left proximal ureter attempting to pass, causing pain  - Urine shows small leukocytes but no blood, concerning for possible infection  - Considered risk of sepsis if stone obstructs with infection; opted for antibiotic treatment  - Recommend shock wave lithotripsy for 8mm right-sided stone since large but pt wants to hold off unless he passes it as he tends to pass stones.  - Previous 24-hour urine 2022 showed low citrate (284) and low urine volume  - Monitoring for silent obstruction and potential kidney function loss if stone does not pass within 6 weeks  - Low testosterone levels require confirmation with 2 morning tests <300 ng/dL before 8am for insurance coverage    KIDNEY STONE:  - Explained risk of sepsis with obstructing stone and concurrent infection.  - Explained silent obstruction risk and potential kidney function loss if stone does not pass within 6 weeks.  - Ang to increase fluid intake to improve urine volume.  - Recommend increasing citrate intake through diet (lemonade, citrus fruits).  - Ang to avoid caffeine and soda.  - Recommend drinking beverages with citric acid (e.g., Fresca) if consuming soda.  - Started Toradol for inflammation and pain relief, to be alternated with tramadol.  - Started tramadol for pain relief, to be alternated with Toradol.  - Started doxycycline 100mg twice daily for 7 days; discontinue if urine culture is negative.  - Increased Flomax to 2 tablets at night.  - Continued potassium citrate daily; recommended increasing to twice daily.  - Urinalysis with microscopy and culture ordered from today since small leuk and trr bld. ER if he has fevers or chills due to risk of sepsis  - CT renal stone study ordered for 6 weeks if stone does not pass.  - BMP ordered prior to CT if stone does not pass.  - 24-hour urine collection ordered for repeat in a few months.  - Contact  the office to cancel CT appointment if stone passes before scheduled date.    C. DIFF PREVENTION:  - Discussed C.  - diff risk with antibiotic use and importance of probiotics.    LOW TESTOSTERONE:  - Discussed insurance requirements for testosterone treatment (2 morning tests <300 ng/dL before 8am).  - Morning testosterone test ordered for tomorrow before 8am.  - If testosterone <300 ng/dL, repeat morning testosterone test ordered for Monday before 8am.  - Follow up next Thursday to discuss testosterone results.  - Contact the office to cancel Thursday appointment and repeat testosterone test if first test >300 ng/dL.    FOLLOW UP:  - Follow up after CT, urine, and labs to decide on plan.  - Follow up with urology NP in 3-4 months to review 24-hour urine results.

## 2024-10-24 NOTE — PATIENT INSTRUCTIONS
Encounter Diagnoses   Name Primary?    Nephrolithiasis Yes    Calculus of urinary tract in diseases classified elsewhere     Hypogonadism in male          Plan:  Abbreviated/Short Plan:  Trial of passage- tends to pass stones.   Sent toradol and tramadol. Alternate for pain  Sent in doxyccyline abx - BID x 7d. But dc if culture neg bc of h/o cdif.   Sent urine (small leuk but no uti sx) for ua josué and culture  Recommend repeat 24 hour urine on k cit (taking one a day) in a few months.   Fu in 3-4 months with urology np to review 24 hour urine. In meantime increase citrate and fluid intake.  Schedule ctrss in 6 weeks with ua, bmp prior if stone doesn't pass. Cancel if stone passes and fu after ct. Telephone visit after ct, urine and blood test to decide on plan.   Morning testosterone tomorrow before 8am and if <300 then repeat another one on Monday. Then fu next thurs for telephone or virtual visit. (Already had psa and cbc recently). If T>300 then cancel appt and cancel repeat T      The following assessment plan was created by [employer] via ambient listening:  Assessment & Plan    IMPRESSION:  - Patient has multiple kidney stones: 13 on left, 8 on right; largest 8mm on right side  - 4mm stone in left proximal ureter attempting to pass, causing pain  - Urine shows small leukocytes but no blood, concerning for possible infection  - Considered risk of sepsis if stone obstructs with infection; opted for antibiotic treatment  - Recommend shock wave lithotripsy for 8mm right-sided stone since large but pt wants to hold off unless he passes it as he tends to pass stones.  - Previous 24-hour urine 2022 showed low citrate (284) and low urine volume  - Monitoring for silent obstruction and potential kidney function loss if stone does not pass within 6 weeks  - Low testosterone levels require confirmation with 2 morning tests <300 ng/dL before 8am for insurance coverage    KIDNEY STONE:  - Explained risk of sepsis with  obstructing stone and concurrent infection.  - Explained silent obstruction risk and potential kidney function loss if stone does not pass within 6 weeks.  - Ang to increase fluid intake to improve urine volume.  - Recommend increasing citrate intake through diet (lemonade, citrus fruits).  - Ang to avoid caffeine and soda.  - Recommend drinking beverages with citric acid (e.g., Fresca) if consuming soda.  - Started Toradol for inflammation and pain relief, to be alternated with tramadol.  - Started tramadol for pain relief, to be alternated with Toradol.  - Started doxycycline 100mg twice daily for 7 days; discontinue if urine culture is negative.  - Increased Flomax to 2 tablets at night.  - Continued potassium citrate daily; recommended increasing to twice daily.  - Urinalysis with microscopy and culture ordered from today since small leuk and trr bld. ER if he has fevers or chills due to risk of sepsis  - CT renal stone study ordered for 6 weeks if stone does not pass.  - BMP ordered prior to CT if stone does not pass.  - 24-hour urine collection ordered for repeat in a few months.  - Contact the office to cancel CT appointment if stone passes before scheduled date.    C. DIFF PREVENTION:  - Discussed C.  - diff risk with antibiotic use and importance of probiotics.    LOW TESTOSTERONE:  - Discussed insurance requirements for testosterone treatment (2 morning tests <300 ng/dL before 8am).  - Morning testosterone test ordered for tomorrow before 8am.  - If testosterone <300 ng/dL, repeat morning testosterone test ordered for Monday before 8am.  - Follow up next Thursday to discuss testosterone results.  - Contact the office to cancel Thursday appointment and repeat testosterone test if first test >300 ng/dL.    FOLLOW UP:  - Follow up after CT, urine, and labs to decide on plan.  - Follow up with urology NP in 3-4 months to review 24-hour urine results.

## 2024-10-25 ENCOUNTER — LAB VISIT (OUTPATIENT)
Dept: LAB | Facility: HOSPITAL | Age: 69
End: 2024-10-25
Attending: UROLOGY
Payer: MEDICARE

## 2024-10-25 DIAGNOSIS — E29.1 HYPOGONADISM IN MALE: ICD-10-CM

## 2024-10-25 LAB — BACTERIA UR CULT: NO GROWTH

## 2024-10-25 PROCEDURE — 36415 COLL VENOUS BLD VENIPUNCTURE: CPT | Performed by: UROLOGY

## 2024-10-25 PROCEDURE — 84403 ASSAY OF TOTAL TESTOSTERONE: CPT | Performed by: UROLOGY

## 2024-10-27 DIAGNOSIS — F41.1 GAD (GENERALIZED ANXIETY DISORDER): ICD-10-CM

## 2024-10-27 LAB — TESTOST SERPL-MCNC: 275 NG/DL (ref 264–916)

## 2024-10-27 RX ORDER — CITALOPRAM 20 MG/1
20 TABLET, FILM COATED ORAL
Qty: 90 TABLET | Refills: 3 | Status: SHIPPED | OUTPATIENT
Start: 2024-10-27

## 2024-10-28 ENCOUNTER — HOSPITAL ENCOUNTER (OUTPATIENT)
Dept: RADIOLOGY | Facility: HOSPITAL | Age: 69
Discharge: HOME | End: 2024-10-28
Attending: PHYSICAL MEDICINE & REHABILITATION
Payer: MEDICARE

## 2024-10-28 DIAGNOSIS — M54.2 CERVICALGIA: ICD-10-CM

## 2024-10-28 DIAGNOSIS — M54.9 DORSALGIA, UNSPECIFIED: ICD-10-CM

## 2024-10-28 PROCEDURE — 72148 MRI LUMBAR SPINE W/O DYE: CPT | Mod: TC

## 2024-10-28 PROCEDURE — 72141 MRI NECK SPINE W/O DYE: CPT | Mod: 26,,, | Performed by: RADIOLOGY

## 2024-10-28 PROCEDURE — 72148 MRI LUMBAR SPINE W/O DYE: CPT | Mod: 26,,, | Performed by: RADIOLOGY

## 2024-10-28 PROCEDURE — 72141 MRI NECK SPINE W/O DYE: CPT | Mod: TC

## 2024-10-30 ENCOUNTER — OFFICE VISIT (OUTPATIENT)
Dept: SPINE | Facility: CLINIC | Age: 69
End: 2024-10-30
Payer: MEDICARE

## 2024-10-30 ENCOUNTER — OFFICE VISIT (OUTPATIENT)
Dept: PHYSICAL MEDICINE AND REHAB | Facility: CLINIC | Age: 69
End: 2024-10-30
Payer: MEDICARE

## 2024-10-30 VITALS — WEIGHT: 173 LBS | HEIGHT: 68 IN | BODY MASS INDEX: 26.22 KG/M2

## 2024-10-30 VITALS — HEIGHT: 68 IN | BODY MASS INDEX: 27.39 KG/M2 | WEIGHT: 180.75 LBS

## 2024-10-30 DIAGNOSIS — M54.50 CHRONIC BILATERAL LOW BACK PAIN WITHOUT SCIATICA: Primary | ICD-10-CM

## 2024-10-30 DIAGNOSIS — M54.2 CERVICALGIA: ICD-10-CM

## 2024-10-30 DIAGNOSIS — R20.2 LEFT HAND PARESTHESIA: ICD-10-CM

## 2024-10-30 DIAGNOSIS — G56.02 CARPAL TUNNEL SYNDROME OF LEFT WRIST: Primary | ICD-10-CM

## 2024-10-30 DIAGNOSIS — G89.29 CHRONIC BILATERAL LOW BACK PAIN WITHOUT SCIATICA: Primary | ICD-10-CM

## 2024-10-30 PROCEDURE — 99999 PR PBB SHADOW E&M-EST. PATIENT-LVL III: CPT | Mod: PBBFAC,,, | Performed by: PHYSICAL MEDICINE & REHABILITATION

## 2024-10-30 PROCEDURE — 76942 ECHO GUIDE FOR BIOPSY: CPT | Mod: PBBFAC,PN | Performed by: STUDENT IN AN ORGANIZED HEALTH CARE EDUCATION/TRAINING PROGRAM

## 2024-10-30 PROCEDURE — 96372 THER/PROPH/DIAG INJ SC/IM: CPT | Mod: ,,, | Performed by: STUDENT IN AN ORGANIZED HEALTH CARE EDUCATION/TRAINING PROGRAM

## 2024-10-30 PROCEDURE — 99213 OFFICE O/P EST LOW 20 MIN: CPT | Mod: PBBFAC,PN | Performed by: PHYSICAL MEDICINE & REHABILITATION

## 2024-10-30 PROCEDURE — 99214 OFFICE O/P EST MOD 30 MIN: CPT | Mod: S$PBB,25,, | Performed by: STUDENT IN AN ORGANIZED HEALTH CARE EDUCATION/TRAINING PROGRAM

## 2024-10-30 PROCEDURE — 99213 OFFICE O/P EST LOW 20 MIN: CPT | Mod: PBBFAC,27,PN | Performed by: STUDENT IN AN ORGANIZED HEALTH CARE EDUCATION/TRAINING PROGRAM

## 2024-10-30 PROCEDURE — 99999 PR PBB SHADOW E&M-EST. PATIENT-LVL III: CPT | Mod: PBBFAC,,, | Performed by: STUDENT IN AN ORGANIZED HEALTH CARE EDUCATION/TRAINING PROGRAM

## 2024-10-30 PROCEDURE — 99999PBSHW PR PBB SHADOW TECHNICAL ONLY FILED TO HB: Mod: PBBFAC,,,

## 2024-10-30 RX ORDER — METHYLPREDNISOLONE ACETATE 40 MG/ML
40 INJECTION, SUSPENSION INTRA-ARTICULAR; INTRALESIONAL; INTRAMUSCULAR; SOFT TISSUE
Status: DISCONTINUED | OUTPATIENT
Start: 2024-10-30 | End: 2024-10-30 | Stop reason: HOSPADM

## 2024-10-30 RX ORDER — TESTOSTERONE CYPIONATE 200 MG/ML
200 INJECTION, SOLUTION INTRAMUSCULAR
Qty: 10 ML | Refills: 0 | Status: CANCELLED | OUTPATIENT
Start: 2024-10-30 | End: 2025-04-30

## 2024-10-30 RX ADMIN — METHYLPREDNISOLONE ACETATE 40 MG: 40 INJECTION, SUSPENSION INTRA-ARTICULAR; INTRALESIONAL; INTRAMUSCULAR; SOFT TISSUE at 09:10

## 2024-10-31 ENCOUNTER — PATIENT MESSAGE (OUTPATIENT)
Dept: UROLOGY | Facility: CLINIC | Age: 69
End: 2024-10-31

## 2024-10-31 ENCOUNTER — OFFICE VISIT (OUTPATIENT)
Dept: UROLOGY | Facility: CLINIC | Age: 69
End: 2024-10-31
Payer: MEDICARE

## 2024-10-31 DIAGNOSIS — N20.0 NEPHROLITHIASIS: ICD-10-CM

## 2024-10-31 DIAGNOSIS — E29.1 HYPOGONADISM IN MALE: Primary | ICD-10-CM

## 2024-10-31 RX ORDER — TESTOSTERONE 20.25 MG/1.25G
1 GEL TOPICAL DAILY
Qty: 30 PACKET | Refills: 3 | Status: SHIPPED | OUTPATIENT
Start: 2024-10-31

## 2024-11-01 ENCOUNTER — TELEPHONE (OUTPATIENT)
Dept: FAMILY MEDICINE | Facility: CLINIC | Age: 69
End: 2024-11-01
Payer: MEDICARE

## 2024-11-01 DIAGNOSIS — R20.2 LEFT HAND PARESTHESIA: Primary | ICD-10-CM

## 2024-11-04 ENCOUNTER — TELEPHONE (OUTPATIENT)
Dept: NEUROLOGY | Facility: CLINIC | Age: 69
End: 2024-11-04
Payer: MEDICARE

## 2024-11-05 NOTE — TELEPHONE ENCOUNTER
There is 1 active order in the system and I have scheduled from that order on a special day. There is no other orders or appts that need to be scheduled at this time.

## 2024-12-18 ENCOUNTER — OFFICE VISIT (OUTPATIENT)
Dept: PHYSICAL MEDICINE AND REHAB | Facility: CLINIC | Age: 69
End: 2024-12-18
Payer: MEDICARE

## 2024-12-18 VITALS — HEIGHT: 68 IN | BODY MASS INDEX: 26.22 KG/M2 | WEIGHT: 173 LBS

## 2024-12-18 DIAGNOSIS — R20.2 LEFT HAND PARESTHESIA: ICD-10-CM

## 2024-12-18 DIAGNOSIS — M54.12 CERVICAL RADICULOPATHY: ICD-10-CM

## 2024-12-18 DIAGNOSIS — G56.02 CARPAL TUNNEL SYNDROME OF LEFT WRIST: Primary | ICD-10-CM

## 2024-12-18 DIAGNOSIS — G56.22 CUBITAL TUNNEL SYNDROME ON LEFT: ICD-10-CM

## 2024-12-18 PROCEDURE — 95909 NRV CNDJ TST 5-6 STUDIES: CPT | Mod: PBBFAC,PN | Performed by: STUDENT IN AN ORGANIZED HEALTH CARE EDUCATION/TRAINING PROGRAM

## 2024-12-18 PROCEDURE — 99999 PR PBB SHADOW E&M-EST. PATIENT-LVL II: CPT | Mod: PBBFAC,,, | Performed by: STUDENT IN AN ORGANIZED HEALTH CARE EDUCATION/TRAINING PROGRAM

## 2024-12-18 PROCEDURE — 99212 OFFICE O/P EST SF 10 MIN: CPT | Mod: PBBFAC,PN | Performed by: STUDENT IN AN ORGANIZED HEALTH CARE EDUCATION/TRAINING PROGRAM

## 2024-12-18 PROCEDURE — 95886 MUSC TEST DONE W/N TEST COMP: CPT | Mod: PBBFAC,PN | Performed by: STUDENT IN AN ORGANIZED HEALTH CARE EDUCATION/TRAINING PROGRAM

## 2024-12-18 NOTE — PROGRESS NOTES
[facility]  Physical Medicine and Rehabilitation   [address] [place] [address], [address]  [place] [address]      Full Name: Ang Oden Jr Gender: Male  Patient ID: [id number] YOB: 1955      Visit Date: 12/18/2024 08:47  Age: 69 Years 0 Months Old  Examining Physician: Maicol Cruz MD  Referring Physician:  Jordana Anderson MD  Height: 5 feet 8 inch  Weight: 173 lbs  Conclusion: Left hand numbness, neck pain that radiates into the left arm and hand.      Sensory NCS      Nerve / Sites Rec. Site Onset Lat Peak Lat NP Amp PP Amp Segments Distance Velocity     ms ms µV µV  cm m/s   L Median - Digit II (Antidromic)      Wrist Dig II NR NR NR NR Wrist - Dig II 13 NR      Ref.   <=3.40 >=15.0 >=20.0 Ref.     L Ulnar - Digit V (Antidromic)      Wrist Dig V 2.34 3.07 10.1  Wrist - Dig V 11 47      Ref.   <=3.20 >=5.0 >=15.0 Ref.     L Radial - Anatomical snuff box (Forearm)      Forearm Wrist 1.67 2.55 10.4  Forearm - Wrist 10 60      Ref.   <=2.80 >=10.0 >=15.0 Ref.         Motor NCS      Nerve / Sites Muscle Latency Ref. Amplitude Ref. Amp % Duration Segments Distance Lat Diff Velocity Ref.     ms ms mV mV % ms  cm ms m/s m/s   L Ulnar - ADM      Wrist ADM 3.18 <=3.60 11.3 >=5.0 100 7.24 Wrist - ADM 7         B.Elbow ADM 5.99  10.5  93 7.97 B.Elbow - Wrist 17 2.81 60 >=49      A.Elbow ADM 8.96  10.5  92.8 8.23 A.Elbow - B.Elbow 10 2.97 34 >=49   L Median - APB      Wrist APB NR <=4.40 NR >=4.0 NR NR Wrist - APB 7         Elbow APB NR  NR  NR NR Elbow - Wrist 19 NR NR >=49       EMG Summary Table     Spontaneous MUAP Recruitment   Muscle IA Fib PSW Fasc Other Amp Dur. PPP Pattern   L. Deltoid 1+ 1+ 1+ None . 1+ 1+ 1+ N   L. Biceps brachii 1+ 1+ 1+ None . N N N N   L. Triceps brachii N None None None . N N N N   L. Pronator teres N None None None . N N N N   L. Abductor pollicis brevis 1+ 1+ 1+ None . N N N N   L. First dorsal interosseous N None None None . N N N N   L. Cervical paraspinals  N None None None .           Summary    The motor conduction test had results outside of the specified normal range in all 2 of the tested nerves:  In the L Ulnar - ADM study  the take off velocity result was reduced for A.Elbow - B.Elbow segment  In the L Median - APB study  the response was considered absent for Wrist stimulation  the response was considered absent for Elbow stimulation    The sensory conduction test was performed on 3 nerve(s). The results were normal in 2 nerve(s): L Ulnar - Digit V (Antidromic), L Radial - Anatomical snuff box (Forearm). Results outside the specified normal range were found in 1 nerve(s), as follows:  In the L Median - Digit II (Antidromic) study  the response was considered absent for Wrist stimulation    The needle EMG examination was performed in 7 muscles. It was normal in 4 muscle(s): L. Triceps brachii, L. Pronator teres, L. First dorsal interosseous, L. Cervical paraspinals. The study was abnormal in 3 muscle(s), with the following distribution:  Abnormal spontaneous/insertional activity was found in L. Deltoid, L. Biceps brachii, L. Abductor pollicis brevis.  The MUP waveform abnormality was found in L. Deltoid.      Conclusion:     Abnormal study     EMG and nerve conduction study of the LEFT upper extremity revealed the following:      LEFT profoundly severe median mononeuropathy at the wrist (carpal tunnel syndrome)  Acute on chronic left C5, C6 cervical radiculopathy     There was no electrodiagnostic evidence of plexopathy, peripheral polyneuropathy or myopathy.     PLAN: Recommended bracing of the involved wrist at night and f/u with referring provider for additional management.  ____________________________  Maicol Cruz MD

## 2025-01-22 ENCOUNTER — PATIENT MESSAGE (OUTPATIENT)
Dept: PHYSICAL MEDICINE AND REHAB | Facility: CLINIC | Age: 70
End: 2025-01-22
Payer: MEDICARE

## 2025-01-26 ENCOUNTER — LAB VISIT (OUTPATIENT)
Dept: LAB | Facility: HOSPITAL | Age: 70
End: 2025-01-26
Attending: UROLOGY
Payer: MEDICARE

## 2025-01-26 DIAGNOSIS — E29.1 HYPOGONADISM IN MALE: ICD-10-CM

## 2025-01-26 LAB
BASOPHILS # BLD AUTO: 0.03 K/UL (ref 0–0.2)
BASOPHILS NFR BLD: 0.5 % (ref 0–1.9)
COMPLEXED PSA SERPL-MCNC: 0.62 NG/ML (ref 0–4)
DIFFERENTIAL METHOD BLD: ABNORMAL
EOSINOPHIL # BLD AUTO: 0.3 K/UL (ref 0–0.5)
EOSINOPHIL NFR BLD: 4.7 % (ref 0–8)
ERYTHROCYTE [DISTWIDTH] IN BLOOD BY AUTOMATED COUNT: 13.3 % (ref 11.5–14.5)
HCT VFR BLD AUTO: 41.7 % (ref 40–54)
HGB BLD-MCNC: 14.2 G/DL (ref 14–18)
IMM GRANULOCYTES # BLD AUTO: 0.01 K/UL (ref 0–0.04)
IMM GRANULOCYTES NFR BLD AUTO: 0.2 % (ref 0–0.5)
LYMPHOCYTES # BLD AUTO: 1.9 K/UL (ref 1–4.8)
LYMPHOCYTES NFR BLD: 29 % (ref 18–48)
MCH RBC QN AUTO: 31.3 PG (ref 27–31)
MCHC RBC AUTO-ENTMCNC: 34.1 G/DL (ref 32–36)
MCV RBC AUTO: 92 FL (ref 82–98)
MONOCYTES # BLD AUTO: 0.5 K/UL (ref 0.3–1)
MONOCYTES NFR BLD: 8.1 % (ref 4–15)
NEUTROPHILS # BLD AUTO: 3.8 K/UL (ref 1.8–7.7)
NEUTROPHILS NFR BLD: 57.5 % (ref 38–73)
NRBC BLD-RTO: 0 /100 WBC
PLATELET # BLD AUTO: 204 K/UL (ref 150–450)
PMV BLD AUTO: 9.1 FL (ref 9.2–12.9)
RBC # BLD AUTO: 4.53 M/UL (ref 4.6–6.2)
WBC # BLD AUTO: 6.58 K/UL (ref 3.9–12.7)

## 2025-01-26 PROCEDURE — 85025 COMPLETE CBC W/AUTO DIFF WBC: CPT | Performed by: UROLOGY

## 2025-01-26 PROCEDURE — 84403 ASSAY OF TOTAL TESTOSTERONE: CPT | Performed by: UROLOGY

## 2025-01-26 PROCEDURE — 84153 ASSAY OF PSA TOTAL: CPT | Mod: GA | Performed by: UROLOGY

## 2025-01-27 ENCOUNTER — PATIENT MESSAGE (OUTPATIENT)
Dept: FAMILY MEDICINE | Facility: CLINIC | Age: 70
End: 2025-01-27
Payer: MEDICARE

## 2025-01-27 DIAGNOSIS — R06.81 WITNESSED EPISODE OF APNEA: ICD-10-CM

## 2025-01-27 DIAGNOSIS — F51.12 INSUFFICIENT SLEEP SYNDROME: ICD-10-CM

## 2025-01-27 DIAGNOSIS — R06.83 SNORING: Primary | ICD-10-CM

## 2025-01-28 ENCOUNTER — OFFICE VISIT (OUTPATIENT)
Dept: PHYSICAL MEDICINE AND REHAB | Facility: CLINIC | Age: 70
End: 2025-01-28
Payer: MEDICARE

## 2025-01-28 VITALS
SYSTOLIC BLOOD PRESSURE: 132 MMHG | BODY MASS INDEX: 26.23 KG/M2 | WEIGHT: 173.06 LBS | HEIGHT: 68 IN | HEART RATE: 64 BPM | DIASTOLIC BLOOD PRESSURE: 84 MMHG

## 2025-01-28 DIAGNOSIS — G56.02 CARPAL TUNNEL SYNDROME OF LEFT WRIST: Primary | ICD-10-CM

## 2025-01-28 LAB — TESTOST SERPL-MCNC: 234 NG/DL (ref 264–916)

## 2025-01-28 PROCEDURE — 99214 OFFICE O/P EST MOD 30 MIN: CPT | Mod: PBBFAC,PN | Performed by: STUDENT IN AN ORGANIZED HEALTH CARE EDUCATION/TRAINING PROGRAM

## 2025-01-28 PROCEDURE — 99999 PR PBB SHADOW E&M-EST. PATIENT-LVL IV: CPT | Mod: PBBFAC,,, | Performed by: STUDENT IN AN ORGANIZED HEALTH CARE EDUCATION/TRAINING PROGRAM

## 2025-01-28 PROCEDURE — 99214 OFFICE O/P EST MOD 30 MIN: CPT | Mod: S$PBB,,, | Performed by: STUDENT IN AN ORGANIZED HEALTH CARE EDUCATION/TRAINING PROGRAM

## 2025-01-28 NOTE — ASSESSMENT & PLAN NOTE
Continue HEP  Repeat left CTI with 40 mg of Depo-Medrol and 1 cc of 1% lidocaine.  See procedure note for details.    Continue bracing at night recommended   Reviewed EMG/NCS of the left upper extremity.    Referral to hand surgery placed.  He has seen Dr. Allen Silva in the past.  Discussed the potential for a radiocarpal joint injection in the future as indicated.  He has had excellent relief with this injection in the past.  Return to clinic in 3 months or p.r.n..

## 2025-01-28 NOTE — PROGRESS NOTES
PHYSICAL MEDICINE AND REHABILITATION  F/u visit:    Subjective:   Chief Complaint:    Chief Complaint   Patient presents with    Carpal tunnel syndrome of left wrist      Repeat injection      Interval note on 01/28/2025:  Patient arrives today for scheduled follow up.  He reports good relief with the previous injection for carpal tunnel syndrome.  He is requesting a repeat injection to the left wrist.  He tolerated his previous injection very well.    HPI: Today,  left wrist pain.  He has been referred for workup for carpal tunnel syndrome versus cervical radiculopathy. N/t started approx 2 months ago.  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  His pain is worse with activity and movement and improved with rest.  He was diagnosed with SLAC arthritis of the left wrist.  He is status post partial scaphoid excision to address his underlying pain complaints.  He has had residual pain following surgical intervention which was expected.  He remains under the care of Dr. Allen Silva and recently underwent a steroid injection to the left radiocarpal joint on 07/08/2024.  X-rays of the wrist from 07/08/2024 show no acute bony abnormality and stable findings of SLAC wrist.  He has been through occupational therapy with some improvement but still complains of weakness of his , pain in the left wrist and hand and numbness in digits 1 through 4.      Review of Systems  Per HPI    Imaging/Diagnostic Studies  X-rays of the wrist from 07/08/2024 show no acute bony abnormality and stable findings of SLAC wrist.  ======    EMG/NCS:  Conclusion:     Abnormal study     EMG and nerve conduction study of the LEFT upper extremity revealed the following:      LEFT profoundly severe median mononeuropathy at the wrist (carpal tunnel syndrome)  Acute on chronic left C5, C6 cervical radiculopathy     There was no electrodiagnostic evidence of plexopathy, peripheral polyneuropathy or myopathy.     PLAN:  Recommended bracing of the involved wrist at night and f/u with referring provider for additional management.    Past Medical History:   Diagnosis Date    Anemia     Anticoagulant long-term use     Chronic kidney disease     Chronic rhinitis     Disc     lower back    DJD (degenerative joint disease)     Foot pain     left foot    Gout, unspecified     HBP (high blood pressure)     High cholesterol     Kidney stone     Sleep apnea     Weight loss     15 pounds in two months     Past Surgical History:   Procedure Laterality Date    APPENDECTOMY      CARPECTOMY Left 4/19/2024    Procedure: CARPECTOMY - left radial styloidectomy and partial scaphoid excision;  Surgeon: Allen Silva MD;  Location: [facility] OR;  Service: Orthopedics;  Laterality: Left;    COLONOSCOPY N/A 11/11/2021    Procedure: COLONOSCOPY;  Surgeon: Christian Santiago MD;  Location: [facility];  Service: Endoscopy;  Laterality: N/A;    ELBOW SURGERY      REMOVAL  4/19/2024    Procedure: REMOVAL, STYLOID BONE;  Surgeon: Allen Silva MD;  Location: [facility] OR;  Service: Orthopedics;;    VASECTOMY       Review of patient's allergies indicates:   Allergen Reactions    No known drug allergies      Current Outpatient Medications   Medication Sig Dispense Refill    aspirin (ECOTRIN) 81 MG EC tablet Take by mouth. 1 Tablet, Delayed Release (E.C.) Oral Every day      atorvastatin (LIPITOR) 40 MG tablet Take 1 tablet (40 mg total) by mouth once daily. 90 tablet 3    azelastine (ASTELIN) 137 mcg (0.1 %) nasal spray SPRAY 1 SPRAY IN EACH NOSTRIL 2 TIMES A DAY 90 mL 3    ciclopirox (PENLAC) 8 % Soln Apply topically nightly. 6.6 mL PRN    citalopram (CELEXA) 20 MG tablet TAKE 1 TABLET BY MOUTH EVERY DAY 90 tablet 3    fluticasone propionate (FLONASE) 50 mcg/actuation nasal spray 1 SPRAY (50 MCG TOTAL) BY EACH NOSTRIL ROUTE ONCE DAILY. 48 mL PRN    gabapentin (NEURONTIN) 300 MG capsule Take 1 capsule (300 mg total) by mouth every evening. 30 capsule 1    meloxicam  (MOBIC) 15 MG tablet Take 1 tablet (15 mg total) by mouth once daily. 30 tablet 0    multivitamin (THERAGRAN) per tablet Take 1 tablet by mouth once daily.      potassium citrate (UROCIT-K 15) 15 mEq TbSR TAKE 15 MEQ BY MOUTH 2 (TWO) TIMES A DAY. TAKE TWICE DAILY FOR STONE PREVENTION. WILL SEE IN STILL 180 tablet 3    testosterone (ANDROGEL) 1.62 % (20.25 mg/1.25 gram) GlPk Place 1.25 g onto the skin once daily. 30 packet 3    mometasone 0.1% (ELOCON) 0.1 % cream Apply topically 2 (two) times daily as needed. 15 g 3    tamsulosin (FLOMAX) 0.4 mg Cap Take 2 capsules (0.8 mg total) by mouth once daily. Take 1 nightly to help pass stone 180 capsule 3    traMADoL (ULTRAM) 50 mg tablet Take 1 tablet (50 mg total) by mouth every 6 (six) hours as needed for Pain. 10 tablet 0    traZODone (DESYREL) 50 MG tablet Take 50 mg by mouth.       No current facility-administered medications for this visit.     Facility-Administered Medications Ordered in Other Visits   Medication Dose Route Frequency Provider Last Rate Last Admin    fentaNYL 50 mcg/mL injection 100 mcg  100 mcg Intravenous PRN Hayley Edge PA-C   100 mcg at 04/19/24 0959    midazolam injection 1 mg  1 mg Intravenous PRN Hayley Edge PA-C   2 mg at 04/19/24 0959       Family History   Problem Relation Name Age of Onset    Cancer Mother      Heart disease Mother      Heart failure Mother      Liver disease Father          secondary to alcohol    Diabetes Maternal Grandmother      Allergies Son      Asthma Son      Urolithiasis Neg Hx      Prostate cancer Neg Hx      Kidney cancer Neg Hx      Eczema Neg Hx      Immunodeficiency Neg Hx      Angioedema Neg Hx      Allergic rhinitis Neg Hx      Atopy Neg Hx      Rhinitis Neg Hx      Urticaria Neg Hx         Social History     Socioeconomic History    Marital status:    Tobacco Use    Smoking status: Former     Current packs/day: 0.00     Types: Cigarettes     Quit date: 9/10/1982  "    Years since quittin.4    Smokeless tobacco: Never   Substance and Sexual Activity    Alcohol use: Not Currently    Drug use: Yes     Types: Marijuana     Comment: occassioanlly, had one last week     Social Drivers of Health     Financial Resource Strain: Patient Declined (3/4/2024)    Overall Financial Resource Strain (CARDIA)     Difficulty of Paying Living Expenses: Patient declined   Food Insecurity: Patient Declined (3/4/2024)    Hunger Vital Sign     Worried About Running Out of Food in the Last Year: Patient declined     Ran Out of Food in the Last Year: Patient declined   Transportation Needs: Patient Declined (3/4/2024)    PRAPARE - Transportation     Lack of Transportation (Medical): Patient declined     Lack of Transportation (Non-Medical): Patient declined   Physical Activity: Sufficiently Active (3/4/2024)    Exercise Vital Sign     Days of Exercise per Week: 7 days     Minutes of Exercise per Session: 30 min   Stress: Stress Concern Present (3/4/2024)    [employer] - Occupational Stress Questionnaire     Feeling of Stress : To some extent   Housing Stability: Patient Declined (3/4/2024)    Housing Stability Vital Sign     Unable to Pay for Housing in the Last Year: Patient declined     Number of Places Lived in the Last Year: 1     Unstable Housing in the Last Year: Patient declined         Objective:    /84 (Patient Position: Sitting)   Pulse 64   Ht 5' 8" (1.727 m)   Wt 78.5 kg (173 lb 1 oz)   BMI 26.31 kg/m²   Physical Exam  Constitutional:       Appearance: Normal appearance.   HENT:      Head: Normocephalic and atraumatic.   Eyes:      Extraocular Movements: Extraocular movements intact.   Cardiovascular:      Rate and Rhythm: Normal rate.   Pulmonary:      Effort: Pulmonary effort is normal.   Abdominal:      General: Abdomen is flat.   Skin:     General: Skin is warm and dry.   Neurological:      General: No focal deficit present.      Mental " Status: He is alert and oriented to person, place, and time. Mental status is at baseline.   Psychiatric:         Mood and Affect: Mood normal.         Behavior: Behavior normal.         Thought Content: Thought content normal.        Left Hand Exam     Range of Motion   Wrist   Extension:  abnormal   Flexion:  abnormal   Pronation:  normal   Supination:  normal     Muscle Strength   Wrist extension: 5/5   Wrist flexion: 5/5   :  4/5     Tests   Phalens sign: positive  Tinel's sign (median nerve): positive  Finkelstein's test: negative    Other   Erythema: absent  Scars: present  Sensation: normal  Pulse: present    Comments:  Atrophy noted to the left thenar eminence           Assessment:       ICD-10-CM ICD-9-CM    1. Carpal tunnel syndrome of left wrist  G56.02 354.0 ULS US Guidance for Needle Placement      Ambulatory referral/consult to Hand Surgery              Plan:   1. Carpal tunnel syndrome of left wrist  Assessment & Plan:  Continue HEP  Repeat left CTI with 40 mg of Depo-Medrol and 1 cc of 1% lidocaine.  See procedure note for details.    Continue bracing at night recommended   Reviewed EMG/NCS of the left upper extremity.    Referral to hand surgery placed.  He has seen Dr. Allen Silva in the past.  Discussed the potential for a radiocarpal joint injection in the future as indicated.  He has had excellent relief with this injection in the past.  Return to clinic in 3 months or p.r.n..    Orders:  -     ULS US Guidance for Needle Placement  -     Ambulatory referral/consult to Hand Surgery; Future; Expected date: 02/04/2025      Maicol Cruz MD  Physical Medicine & Rehabilitation     Disclaimer:  This note may have been prepared using voice recognition software, it may have not been extensively proofed, as such there could be errors within the text such as sound alike errors.  Contact the author of this note for clarification.

## 2025-01-29 ENCOUNTER — OFFICE VISIT (OUTPATIENT)
Dept: UROLOGY | Facility: CLINIC | Age: 70
End: 2025-01-29
Payer: MEDICARE

## 2025-01-29 VITALS — BODY MASS INDEX: 26.23 KG/M2 | HEIGHT: 68 IN | WEIGHT: 173.06 LBS

## 2025-01-29 DIAGNOSIS — E29.1 HYPOGONADISM IN MALE: ICD-10-CM

## 2025-01-29 DIAGNOSIS — N20.0 KIDNEY STONE: Primary | ICD-10-CM

## 2025-01-29 PROCEDURE — 99214 OFFICE O/P EST MOD 30 MIN: CPT | Mod: PBBFAC,PO | Performed by: NURSE PRACTITIONER

## 2025-01-29 PROCEDURE — 99214 OFFICE O/P EST MOD 30 MIN: CPT | Mod: S$PBB,,, | Performed by: NURSE PRACTITIONER

## 2025-01-29 PROCEDURE — G2211 COMPLEX E/M VISIT ADD ON: HCPCS | Mod: S$PBB,,, | Performed by: NURSE PRACTITIONER

## 2025-01-29 PROCEDURE — 99999 PR PBB SHADOW E&M-EST. PATIENT-LVL IV: CPT | Mod: PBBFAC,,, | Performed by: NURSE PRACTITIONER

## 2025-01-29 RX ORDER — TESTOSTERONE 20.25 MG/1.25G
2 GEL TOPICAL DAILY
Qty: 60 PACKET | Refills: 3 | Status: SHIPPED | OUTPATIENT
Start: 2025-01-29

## 2025-01-29 NOTE — TELEPHONE ENCOUNTER
Ok. I placed an order for a home sleep study. The sleep lab will call and have you come  a kit to screen you at home. We will proceed from there to get a CPAP if indicated.

## 2025-01-29 NOTE — PROGRESS NOTES
[facility]  Group: Rocío/Arden/Yessi/Salbador  NPs: Angelica Loza/Karey Clements    Note today written by:Karey Clements  Date of Service: 01/29/2025    CHIEF COMPLAINT: F/u kidney stones and low T    PRESENTING ILLNESS:    Ang Oden Jr. is a 69 y.o. male who presents for f/u kidney stones and low T. Last visit was 10/31/24 with Dr Alford    Ang Oden Jr. is a 68 y.o. male with a PMH of BPH, kidney stones, HTN, Gout who presents for kidney stones. Last clinic visit was 1/19/21 with Dr. Hartmann.  Ctrss 2/17/19- multiple B renal stones  Ctrss 1/7/20- had multiple R renal stones. A few left renal stones.   Ctrss 11/20/20 -only 1 right renal stone. A few left renal stones.   Patient was seen in ED 7/7/22 for right flank pain. Patient reported that he passed a small stone that morning. Discharged home with norco, zofran, ketorolac, and flomax. UA negative  CT RSS 7/7/22: The kidneys are of normal size contour and CT density for a noncontrast study.  On the right or 10 intrarenal calcifications consistent with stones.  The largest measures 6 mm.  Hydronephrosis or ureteral stone is not seen.  On the left are 12 intrarenal calcifications.  The largest measures 4 mm.  Hydronephrosis or ureteral stone is not seen on this side.  The bladder is of normal contour without mass or asymmetry  7/14/22 visit with Karey patient presents to clinic for f/u kidney stones. Patient reports he has passed a few stones since ED visit. Only symptom is flank pain. Denies dysuria, gross hematuria, fever, chills, nausea or vomiting. Denies difficulty voiding. He takes flomax when he is passing stone and needs refill. He also request refill for pain medication. His previous urologist would give him pain medication and flomax to keep on hand if needed for stones. He was not able to collect any stones that he recently passed.  Patient reports he does pass stones very  often and has not ever needed surgery to remove stones.   Drinks: does not drink as much water as he should  3/14/19 Stone analysis resulted 100% calcium oxalate stone  He does have hx of Gout but no uric acid stone that patient is aware of. No recent Gout attacks since he stopped drinking alcohol.     Interval history 8/16/22:  He was c/o pain and so she sent him for ct. Ct essentially unchanged. No ureteral stones, no hydro. However continues to pass small fragments. Does ride a motorcycle about 4x a week. Stones 8/2: 100% calcium oxalate. Ca on last bmp 7/7/22 9.2  He has had stones for 10 to 15 years or more. No family hx. Review of ct in 2020 shows fewer stones. Doesn't appear to have done a 24 hour urine in the charts but thinks he's done before, parathyroid hormone or has never been a medication for kidney stones. He's passed stones as big as a qtip. Meds: no topamax, vit D or calcium.   Only takes flomax as needed although ct shows stones in prostate and intravesical portion of prostate. Otherwise no problems with urinating.   ua today is negative     ctrss 7/21/22:            Interval history 10/18/22:  Here to discuss 24 hour urine done 8/25/22. Shows low UOP: 1.03L and low citrate 284. Pth 46.8. his citrate in 2019 was normal at 689 and his ct then in 2019 was the same as ct now but ct in 2020 actually showed less stones in R kidney. He had low uop then as well. Says he doesn't like drinking too much fluids, causes him to bloat.             He said he's passed stones about 3x a week since I last saw him. He says he only likes to take flomax when he needs to- if he's passing a stone, but he's been taking every day for the last 2 months bc he's been passing stones.  He says he notices a stronger flow but wants to stop the medication if he can bc he doesn't like to take meds.   Largest stone he has passed is  about 6mm  He said he's still having R flank pain that is constant but worse when he's about to pass  another stone. The right flank pain has never gone away since his ct in august. No images since august 2022.  Gave him toradol last visit. He says didn't help with pain.      Interval history by ME in CLINIC on 10/5/23 for kidney stones:  Had him do a PSA and it was 0.68 on 09/01/2023.  Same exact as last year on 07/21/2022.  Denies any difficulty urinating except for when he is trying to pass stones.     He says he passed a stone last week, about 2 mm.  A few months ago he passed stones daily for a month.  Right flank pain radiating to his right groin.  He is about to run out of Flomax.  His urine today shows trace blood and trace leukocyte.  No fevers or chills.     Review of his CT from last year showed that he would 10+ stones on each side, the largest measuring about 5 mm in the right.  His recent KUB (Xray of the abdomen to look for stones) on 09/27/2023 showed bilateral small stones.  None appearing in the ureter.  Did not get an ultrasound yet.     Has not really been purposely trying to increase citrate intake.  We discussed again today     Ctrss 10/18/22         Kub 9/27/23       Interval history by ME/ in CLINIC on 10/24/24:  CHIEF COMPLAINT:  Ang presents today for testosterone evaluation and kidney stone follow-up.     KIDNEY STONES:  A CT on October 21st showed slight left-sided hydronephrosis secondary to left proximal 4mm stone, 13 stones on the left (largest 4mm), and 8 stones on the right (largest 8mm).He has a current experiencing pain rated at 6.5/10 in the left side near the bladder. He reports increased urination frequency but denies dysuria, fevers, chills, hematuria, or cloudy urine. He has a history of multiple kidney stones bilaterally, having passed 5-7 small stones in the past year with associated pain. Urine today with small leuk and tr bld. No uti sx. No fevers or chills.      MEDICATIONS:  He started Flomax as prescribed for stone passage (not bph) and takes potassium  citrate daily, though he was instructed to take it twice daily but often forgets the second dose.     DIETARY HABITS:  He reports regular soda consumption, acknowledging it may contribute to dehydration and exacerbate his condition.     MEDICAL HISTORY:  He is borderline anemic, currently being evaluated by his primary care physician. He has a history of C. diff infection in 2011. He denies diabetes and is not on any blood thinners.     LOW TESTOSTERONE:  He has a history of low testosterone. Two tests performed after 8:00 a.m. showed low levels. A third test before 8:00 a.m. resulted in a level of 372, which is above the diagnostic threshold of 300. He is aware that testosterone testing should be performed before 8:00 a.m. for accurate results.     Only pertinent history related to their urologic issues were discussed above.   Rbus 10/21/24: read says not hydro but independent review shows possible hydro          Ctrss 10/21/24: b stones. 13 left, 8 right. Porximal 5mm left stone with mild hydro. Confirmed with ind review               Interval history by ME/ - CLINIC virtual visit (AUDIO ONLY) on 10/31/24:  Passed L ureteral stone, still has multiple b stones  Testosterone before 9am 275 on 10/25/24 and 264 on 10/28/24  Never has been on testosterone       The pt is also c/o of symptoms of low T and would like further evaluation.   The patient has not been any evaluated in the past.  The patient has not been any on exogenous testosterone in the past.  The patient does have good libido   The patient does not have erectile dysfunction  The patient does have depression occasionally. On medicines for fatigue. Started medicines years ago. Comes on and off them.   The patient does have fatigue. Comes and goes. Wants to sleep anytime he sits.   The patient does have concentration issues. Sometimes.   The patient does not know have difficulty gaining or maintaining muscle mass     The patient gets 5 sleep a  night. Has sleep apnea. Doesn't use machine. He says lost machine. Last used it years ago.   The patient does not workout regularly  The patient does not have a family history of prostate cancer     Children (last child, fathered?, plans to have any more): 3, had a vasectomy     Psa 0.55  9/6/24  H/h 12.8/38.3    Interval history 1/29/25 (Tyree TRUJILLO)  Pt presents for f/u kidney stones and low T    1/26/25  T 234  PSA 0.62  H&H 14.2 / 41.7    Pt is using androgel 1.25 g (1 packet) on skin daily. No improvement to symptoms of low T  Would like to increase to 2 packets per day  Awaiting appt for sleep study    24 hour urine 11/15/24:  Taking urocit K 15 mEq daily. Cannot tolerate twice a day  10/21/24 K 4.0 / creatinine 0.9 / GFR >60       Urine history: family history of kidney, bladder or prostate cancer:No, personal or family history of kidney stones: Yes - personal, no family ,tobacco use: Yes - quit 30 yrs ago -smoked for 10 years, anticoagulation: No   10/24/24          Ng, Small leuk/tr ket- 8 rbc  10/21/243+bld, >100 rbc  10/5/23Tr leuk/tr bld  10/18/22          Neg   8/16/22Neg  7/14/22Neg  7/7/22              Neg  7/14/20Neg  1/7/20              Ng, void: tr bld  2/17/192+bld  6/28/162+bld  11/4/15Neg  9/25/153+bld  11/16/11          Neg      PSA History: no fam hx of prostate cancer\  9/6/240.55  9/1/230.68  Component PSA, Screen PSA Diagnostic   Latest Ref Rng & Units 0.00 - 4.00 ng/mL 0.00 - 4.00 ng/mL   7/21/2022 0.68     1/19/2021   0.83   1/7/2020   0.85   12/7/2018 0.46     3/8/2017 0.40     11/4/2015 0.43     1/30/2014 0.38     11/3/2012 0.56     1/7/2012 0.52     12/28/2010 0.36            REVIEW OF SYSTEMS: as stated above in hpi    PATIENT HISTORY:    Past Medical History:   Diagnosis Date    Anemia     Anticoagulant long-term use     Chronic kidney disease     Chronic rhinitis     Disc     lower back    DJD (degenerative joint disease)     Foot pain     left foot    Gout, unspecified     HBP (high  blood pressure)     High cholesterol     Kidney stone     Sleep apnea     Weight loss     15 pounds in two months       Past Surgical History:   Procedure Laterality Date    APPENDECTOMY      CARPECTOMY Left 4/19/2024    Procedure: CARPECTOMY - left radial styloidectomy and partial scaphoid excision;  Surgeon: Allen Silva MD;  Location: [facility] OR;  Service: Orthopedics;  Laterality: Left;    COLONOSCOPY N/A 11/11/2021    Procedure: COLONOSCOPY;  Surgeon: Christian Santiago MD;  Location: [facility];  Service: Endoscopy;  Laterality: N/A;    ELBOW SURGERY      REMOVAL  4/19/2024    Procedure: REMOVAL, STYLOID BONE;  Surgeon: Allen Silva MD;  Location: [facility] OR;  Service: Orthopedics;;    VASECTOMY           PHYSICAL EXAMINATION:  Constitutional: He is oriented to person, place, and time. He appears well-developed and well-nourished.  He is in no apparent distress.  Abdominal:  He exhibits no distension.  There is no CVA tenderness.   Neurological: He is alert and oriented to person, place, and time.   Psych: Cooperative with normal affect.      Physical Exam      LABS:    Lab Results   Component Value Date    PSA 0.62 01/26/2025    PSA 0.55 09/06/2024    PSA 0.71 03/22/2024    PSADIAG 0.83 01/19/2021    PSADIAG 0.85 01/07/2020     Lab Results   Component Value Date    CREATININE 0.9 10/21/2024         IMPRESSION:    Encounter Diagnoses   Name Primary?    Kidney stone Yes    Hypogonadism in male        PLAN:  -Reviewed 24 hour urine  Recommend to increase water intake to at least 2 L per day. Decrease salt intake  Continue on urocit K daily.    -Reviewed labs results from 1/26/25. Testosterone remains <300 on androgel 1 packet per day and no improvement to fatigue. Increase androgel to 2.5 g (2 packets) daily. Refilled to pharmacy. Repeat labs prior to f/u appt with MD    -Proceed with sleep study as recommended    -RTC as scheduled with imaging and labs prior      I encouraged him or any of his family members to  call or email me with questions and/or concerns.    Visit today is associated with current or anticipated ongoing medical care related to this patient's single serious condition/complex condition, kidney stones, low T      30 minutes of total time spent on the encounter, which includes face to face time and non-face to face time preparing to see the patient (eg, review of tests), Obtaining and/or reviewing separately obtained history, Documenting clinical information in the electronic or other health record, Independently interpreting results (not separately reported) and communicating results to the patient/family/caregiver, or Care coordination (not separately reported).

## 2025-01-30 NOTE — TELEPHONE ENCOUNTER
No care due was identified.  [facility] Embedded Care Due Messages. Reference number: [id number].   11/02/2023 2:01:12 PM CDT  
See ZMP message. Please advise.   Pt is requesting a refill.     LOV 9/7/23  LAB 9/7/23  Next OV 3/7/24    
What Type Of Note Output Would You Prefer (Optional)?: Standard Output
How Severe Are Your Spot(S)?: mild
Have Your Spot(S) Been Treated In The Past?: has not been treated
Hpi Title: Evaluation of Skin Lesions
Family Member: Mother

## 2025-02-22 DIAGNOSIS — Z00.00 ENCOUNTER FOR MEDICARE ANNUAL WELLNESS EXAM: ICD-10-CM

## 2025-02-25 ENCOUNTER — PROCEDURE VISIT (OUTPATIENT)
Dept: SLEEP MEDICINE | Facility: HOSPITAL | Age: 70
End: 2025-02-25
Attending: FAMILY MEDICINE
Payer: MEDICARE

## 2025-02-25 DIAGNOSIS — R06.81 WITNESSED EPISODE OF APNEA: ICD-10-CM

## 2025-02-25 DIAGNOSIS — F51.12 INSUFFICIENT SLEEP SYNDROME: ICD-10-CM

## 2025-02-25 DIAGNOSIS — R06.83 SNORING: ICD-10-CM

## 2025-02-25 PROCEDURE — 95806 SLEEP STUDY UNATT&RESP EFFT: CPT

## 2025-03-13 ENCOUNTER — TELEPHONE (OUTPATIENT)
Dept: ORTHOPEDICS | Facility: CLINIC | Age: 70
End: 2025-03-13
Payer: MEDICARE

## 2025-03-13 ENCOUNTER — PATIENT MESSAGE (OUTPATIENT)
Dept: ORTHOPEDICS | Facility: CLINIC | Age: 70
End: 2025-03-13
Payer: MEDICARE

## 2025-03-13 DIAGNOSIS — M79.642 BILATERAL HAND PAIN: Primary | ICD-10-CM

## 2025-03-13 DIAGNOSIS — M79.641 BILATERAL HAND PAIN: Primary | ICD-10-CM

## 2025-03-13 DIAGNOSIS — M79.642 LEFT HAND PAIN: Primary | ICD-10-CM

## 2025-03-17 ENCOUNTER — HOSPITAL ENCOUNTER (OUTPATIENT)
Dept: RADIOLOGY | Facility: OTHER | Age: 70
Discharge: HOME | End: 2025-03-17
Attending: ORTHOPAEDIC SURGERY
Payer: MEDICARE

## 2025-03-17 ENCOUNTER — OFFICE VISIT (OUTPATIENT)
Dept: ORTHOPEDICS | Facility: CLINIC | Age: 70
End: 2025-03-17
Payer: MEDICARE

## 2025-03-17 VITALS — HEIGHT: 68 IN | BODY MASS INDEX: 26.23 KG/M2 | WEIGHT: 173.06 LBS

## 2025-03-17 DIAGNOSIS — M19.132 SCAPHOLUNATE ADVANCED COLLAPSE OF LEFT WRIST: ICD-10-CM

## 2025-03-17 DIAGNOSIS — G56.02 CARPAL TUNNEL SYNDROME OF LEFT WRIST: Primary | ICD-10-CM

## 2025-03-17 DIAGNOSIS — M54.12 CERVICAL RADICULOPATHY: ICD-10-CM

## 2025-03-17 DIAGNOSIS — M79.642 LEFT HAND PAIN: ICD-10-CM

## 2025-03-17 PROCEDURE — 99999 PR PBB SHADOW E&M-EST. PATIENT-LVL V: CPT | Mod: PBBFAC,,, | Performed by: ORTHOPAEDIC SURGERY

## 2025-03-17 PROCEDURE — 99999PBSHW PR PBB SHADOW TECHNICAL ONLY FILED TO HB: Mod: PBBFAC,,,

## 2025-03-17 PROCEDURE — 73130 X-RAY EXAM OF HAND: CPT | Mod: 26,LT,, | Performed by: RADIOLOGY

## 2025-03-17 PROCEDURE — 20605 DRAIN/INJ JOINT/BURSA W/O US: CPT | Mod: PBBFAC,LT | Performed by: ORTHOPAEDIC SURGERY

## 2025-03-17 PROCEDURE — 99215 OFFICE O/P EST HI 40 MIN: CPT | Mod: PBBFAC,25 | Performed by: ORTHOPAEDIC SURGERY

## 2025-03-17 PROCEDURE — 73130 X-RAY EXAM OF HAND: CPT | Mod: TC,FY,LT

## 2025-03-17 RX ORDER — TRIAMCINOLONE ACETONIDE 40 MG/ML
40 INJECTION, SUSPENSION INTRA-ARTICULAR; INTRAMUSCULAR
Status: DISCONTINUED | OUTPATIENT
Start: 2025-03-17 | End: 2025-03-17 | Stop reason: HOSPADM

## 2025-03-17 RX ORDER — CEFAZOLIN SODIUM 2 G/50ML
2 SOLUTION INTRAVENOUS
OUTPATIENT
Start: 2025-03-17

## 2025-03-17 RX ORDER — MUPIROCIN 20 MG/G
OINTMENT TOPICAL
OUTPATIENT
Start: 2025-03-17

## 2025-03-17 RX ADMIN — TRIAMCINOLONE ACETONIDE 40 MG: 40 INJECTION, SUSPENSION INTRA-ARTICULAR; INTRAMUSCULAR at 09:03

## 2025-03-17 NOTE — PROGRESS NOTES
[facility]  History & Physical  Orthopedics    SUBJECTIVE:      COVID-19 attestation:  This patient was treated during the COVID-19 pandemic.  This was discussed with the patient, they are aware of our current policies and procedures, were given the option of delaying their visit and or switching to a virtual visit, delaying their surgery when applicable, and they elect to proceed.    Chief Complaint: L wrist pain    Referring Provider: Maicol Cruz*     History of Present Illness:  Patient is a 69 y.o. left hand dominant male who presents today with complaints of left sided wrist pain. He's been seen in hand clinic before for this pain, and he last received an injection in the left wrist back in September of 2023, which he states gave him 3-4 months of relief. He is interested in surgical options for his wrist at this time.    Interval history March 17, 2025: The patient returns today for re-evaluation.  He notes that his left wrist arthritic pain persists.  He is status post a partial scaphoid excision and radial styloidectomy.  His biggest complaint has now become pain numbness and tingling to the left hand.  The numbness is essentially constant and involves the entire hand with sparing of the small finger.  He had a recent EMG.  He also has had 2 corticosteroid injections to the carpal tunnel with physiatry but these have not helped him at all.  He returns today for re-evaluation with no other complaints.    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    Symptoms are aggravated by activity and movement.    Symptoms are alleviated by rest.    Symptoms consist of pain and decreased ROM.    The patient rates their pain as a 5/10.    Attempted treatment(s) and/or interventions include activity modifications, rest, rest and steroid injection.     The patient denies any fevers, chills, N/V, D/C and presents for evaluation.       Past Medical  History:   Diagnosis Date    Anemia     Anticoagulant long-term use     Chronic kidney disease     Chronic rhinitis     Disc     lower back    DJD (degenerative joint disease)     Foot pain     left foot    Gout, unspecified     HBP (high blood pressure)     High cholesterol     Kidney stone     Sleep apnea     Weight loss     15 pounds in two months     Past Surgical History:   Procedure Laterality Date    APPENDECTOMY      CARPECTOMY Left 4/19/2024    Procedure: CARPECTOMY - left radial styloidectomy and partial scaphoid excision;  Surgeon: Allen Silva MD;  Location: [facility] OR;  Service: Orthopedics;  Laterality: Left;    COLONOSCOPY N/A 11/11/2021    Procedure: COLONOSCOPY;  Surgeon: Christian Santiago MD;  Location: [facility] ENDO;  Service: Endoscopy;  Laterality: N/A;    ELBOW SURGERY      REMOVAL  4/19/2024    Procedure: REMOVAL, STYLOID BONE;  Surgeon: Allen Silva MD;  Location: [facility] OR;  Service: Orthopedics;;    VASECTOMY       Review of patient's allergies indicates:   Allergen Reactions    No known drug allergies      Social History     Social History Narrative    Not on file     Family History   Problem Relation Name Age of Onset    Cancer Mother      Heart disease Mother      Heart failure Mother      Liver disease Father          secondary to alcohol    Diabetes Maternal Grandmother      Allergies Son      Asthma Son      Urolithiasis Neg Hx      Prostate cancer Neg Hx      Kidney cancer Neg Hx      Eczema Neg Hx      Immunodeficiency Neg Hx      Angioedema Neg Hx      Allergic rhinitis Neg Hx      Atopy Neg Hx      Rhinitis Neg Hx      Urticaria Neg Hx           Current Outpatient Medications:     aspirin (ECOTRIN) 81 MG EC tablet, Take by mouth. 1 Tablet, Delayed Release (E.C.) Oral Every day, Disp: , Rfl:     atorvastatin (LIPITOR) 40 MG tablet, Take 1 tablet (40 mg total) by mouth once daily., Disp: 90 tablet, Rfl: 3    azelastine (ASTELIN) 137 mcg (0.1 %) nasal spray, SPRAY 1 SPRAY IN EACH  NOSTRIL 2 TIMES A DAY, Disp: 90 mL, Rfl: 3    ciclopirox (PENLAC) 8 % Soln, Apply topically nightly., Disp: 6.6 mL, Rfl: PRN    citalopram (CELEXA) 20 MG tablet, TAKE 1 TABLET BY MOUTH EVERY DAY, Disp: 90 tablet, Rfl: 3    fluticasone propionate (FLONASE) 50 mcg/actuation nasal spray, 1 SPRAY (50 MCG TOTAL) BY EACH NOSTRIL ROUTE ONCE DAILY., Disp: 48 mL, Rfl: PRN    gabapentin (NEURONTIN) 300 MG capsule, Take 1 capsule (300 mg total) by mouth every evening., Disp: 30 capsule, Rfl: 1    meloxicam (MOBIC) 15 MG tablet, Take 1 tablet (15 mg total) by mouth once daily., Disp: 30 tablet, Rfl: 0    mometasone 0.1% (ELOCON) 0.1 % cream, Apply topically 2 (two) times daily as needed., Disp: 15 g, Rfl: 3    multivitamin (THERAGRAN) per tablet, Take 1 tablet by mouth once daily., Disp: , Rfl:     potassium citrate (UROCIT-K 15) 15 mEq TbSR, TAKE 15 MEQ BY MOUTH 2 (TWO) TIMES A DAY. TAKE TWICE DAILY FOR STONE PREVENTION. WILL SEE IN STILL, Disp: 180 tablet, Rfl: 3    tamsulosin (FLOMAX) 0.4 mg Cap, Take 2 capsules (0.8 mg total) by mouth once daily. Take 1 nightly to help pass stone, Disp: 180 capsule, Rfl: 3    testosterone (ANDROGEL) 1.62 % (20.25 mg/1.25 gram) GlPk, Place 2.5 g onto the skin once daily., Disp: 60 packet, Rfl: 3    traMADoL (ULTRAM) 50 mg tablet, Take 1 tablet (50 mg total) by mouth every 6 (six) hours as needed for Pain., Disp: 10 tablet, Rfl: 0    traZODone (DESYREL) 50 MG tablet, Take 50 mg by mouth., Disp: , Rfl:   No current facility-administered medications for this visit.    Facility-Administered Medications Ordered in Other Visits:     fentaNYL 50 mcg/mL injection 100 mcg, 100 mcg, Intravenous, PRN, Hayley Edge PA-C, 100 mcg at 04/19/24 0959    midazolam injection 1 mg, 1 mg, Intravenous, PRN, Hayley Edge PA-C, 2 mg at 04/19/24 0959      Review of Systems:  As per HPI otherwise noncontributory    OBJECTIVE:      Vital Signs (Most Recent):  Vitals:    03/17/25 0801  "  Weight: 78.5 kg (173 lb 1 oz)   Height: 5' 8" (1.727 m)     Body mass index is 26.31 kg/m².      Physical Exam:  Constitutional: The patient appears well-developed and well-nourished. No distress.   Skin: No lesions appreciated  Head: Normocephalic and atraumatic.   Nose: Nose normal.   Ears: No deformities seen  Eyes: Conjunctivae and EOM are normal.   Neck: No tracheal deviation present.   Cardiovascular: Normal rate and intact distal pulses.    Pulmonary/Chest: Effort normal. No respiratory distress.   Abdominal: There is no guarding.   Neurological: The patient is alert.   Psychiatric: The patient has a normal mood and affect.     Left Hand/Wrist Examination:    Observation/Inspection:  Swelling  none    Deformity  none  Discoloration  none     Scars   left radial wrist, well-healed    Atrophy  none  TTP over the left dorsal wrist    HAND/WRIST EXAMINATION:  Finkelstein's Test   Neg  WHAT Test    Neg  Snuff box tenderness   Neg  Cervantes's Test    Neg  Hook of Hamate Tenderness  Neg  CMC grind    Neg  Circumduction test   Neg    Neurovascular Exam:  Digits WWP, brisk CR < 3s throughout  NVI motor/LTS to M/R/U nerves, radial pulse 2+  Tinel's Test - Carpal Tunnel  positive  Tinel's Test - Cubital Tunnel  positive  Phalen's Test    Neg  Median Nerve Compression Test Neg    ROM hand full, painless    ROM wrist limited due to degenerative changes, decreased flexion, extension, radial and ulnar deviation    ROM elbow full, painless    Abdomen not guarded  Respirations nonlabored  Perfusion intact    Diagnostic Results:    EMG -   Conclusion:      Abnormal study     EMG and nerve conduction study of the LEFT upper extremity revealed the following:      LEFT profoundly severe median mononeuropathy at the wrist (carpal tunnel syndrome)  Acute on chronic left C5, C6 cervical radiculopathy     There was no electrodiagnostic evidence of plexopathy, peripheral polyneuropathy or myopathy.     PLAN: Recommended bracing of the " involved wrist at night and f/u with referring provider for additional management.     Imaging - I independently viewed the patient's imaging as well as the radiology report.  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    ASSESSMENT/PLAN:      69 y.o. 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  We discussed the working diagnosis as well as several other potential alternative diagnoses.  Treatment options were discussed, both conservative and surgical.  Conservative treatment options would include things such as activity modifications, workplace modifications, a period of rest, oral vs topical OTC and prescription anti-inflammatory medications, occupational therapy, splinting/bracing, immobilization, corticosteroid injections, and others.  Surgical options were discussed as well.     At this time, the patient has 3 main issues largely speaking.  He has persistent SLAC arthritis.  For that he would like a corticosteroid injection today.  His biggest issue at this time is his carpal tunnel syndrome which per his EMG is profoundly severe.  He would like to proceed with an open left carpal tunnel release on June 4, 2025 which I feel is reasonable.  I will get this set up.  He does have a positive Tinel's at the left elbow but denies any involvement of the small finger or ulnar distribution and would like to proceed with a carpal tunnel release alone and defer intervention for cubital tunnel at this time.  Additionally, he is not ready for wrist salvage surgery for his advanced SLAC arthritis.  We will proceed as planned.  Referral to [facility] for his cervical radiculopathy as found on the EMG.  Follow-up for surgery.      The patient has not responded to adequate non operative treatment at this time and/or non operative treatment is not indicated. Thus, the risks, benefits  and alternatives to surgery were discussed with the patient in detail.  Specific risks include but are not limited to bleeding, infection, vessel and/or nerve damage, pain, numbness, tingling, complex regional pain syndrome, compartment syndrome, failure to return to pre-injury and/or preoperative functional status, scar sensitivity, delayed healing, inability to return to work, pulley injury, tendon injury, bowstringing, partial and/or incomplete relief of symptoms, weakness, persistence of and/or worsening of symptoms, surgical failure, osteomyelitis, amputation, loss of function, stiffness, functional debility, dysfunction, decreased  strength, need for prolonged postoperative rehabilitation, need for further surgery, deep venous thrombosis, pulmonary embolism, arthritis and death.  The patient states an understanding and wishes to proceed with surgery.   All questions were answered.  No guarantees were implied or stated.  Written informed consent was obtained.    Should the patient's symptoms worsen, persist, or fail to improve they should return for reevaluation and I would be happy to see them back anytime.    Addendum: Patient declines spine referral today.  The referral will be canceled.      Allen Silva M.D.    Please be aware that this note has been generated with the assistance of [employer] voice-to-text.  Please excuse any spelling or grammatical errors.    Thank you for choosing Dr. Allen Silva for your orthopedic hand and upper extremity care. It is our goal to provide you with exceptional care that will help keep you healthy, active, and get you back in the game.     If you felt that you received exemplary care today, please consider leaving feedback for Dr. Silva on [employer]ogs at [email or web address].    Please do not hesitate to reach out to us via email, phone, or MyChart with any questions, concerns, or feedback.

## 2025-03-17 NOTE — H&P
[facility]  History & Physical  Orthopedics     SUBJECTIVE:       COVID-19 attestation:  This patient was treated during the COVID-19 pandemic.  This was discussed with the patient, they are aware of our current policies and procedures, were given the option of delaying their visit and or switching to a virtual visit, delaying their surgery when applicable, and they elect to proceed.     Chief Complaint: L wrist pain     Referring Provider: Maicol Cruz*      History of Present Illness:  Patient is a 69 y.o. left hand dominant male who presents today with complaints of left sided wrist pain. He's been seen in hand clinic before for this pain, and he last received an injection in the left wrist back in September of 2023, which he states gave him 3-4 months of relief. He is interested in surgical options for his wrist at this time.     Interval history March 17, 2025: The patient returns today for re-evaluation.  He notes that his left wrist arthritic pain persists.  He is status post a partial scaphoid excision and radial styloidectomy.  His biggest complaint has now become pain numbness and tingling to the left hand.  The numbness is essentially constant and involves the entire hand with sparing of the small finger.  He had a recent EMG.  He also has had 2 corticosteroid injections to the carpal tunnel with physiatry but these have not helped him at all.  He returns today for re-evaluation with no other complaints.     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     Symptoms are aggravated by activity and movement.     Symptoms are alleviated by rest.     Symptoms consist of pain and decreased ROM.     The patient rates their pain as a 5/10.     Attempted treatment(s) and/or interventions include activity modifications, rest, rest and steroid injection.     The patient denies any fevers, chills, N/V, D/C and presents for evaluation.              Past Medical History:   Diagnosis Date    Anemia      Anticoagulant long-term use      Chronic kidney disease      Chronic rhinitis      Disc       lower back    DJD (degenerative joint disease)      Foot pain       left foot    Gout, unspecified      HBP (high blood pressure)      High cholesterol      Kidney stone      Sleep apnea      Weight loss       15 pounds in two months            Past Surgical History:   Procedure Laterality Date    APPENDECTOMY        CARPECTOMY Left 4/19/2024     Procedure: CARPECTOMY - left radial styloidectomy and partial scaphoid excision;  Surgeon: Allen Silva MD;  Location: [facility] OR;  Service: Orthopedics;  Laterality: Left;    COLONOSCOPY N/A 11/11/2021     Procedure: COLONOSCOPY;  Surgeon: Christian Santiago MD;  Location: [facility] ENDO;  Service: Endoscopy;  Laterality: N/A;    ELBOW SURGERY        REMOVAL   4/19/2024     Procedure: REMOVAL, STYLOID BONE;  Surgeon: Allen Silva MD;  Location: [facility] OR;  Service: Orthopedics;;    VASECTOMY               Review of patient's allergies indicates:   Allergen Reactions    No known drug allergies        Social History          Social History Narrative    Not on file             Family History   Problem Relation Name Age of Onset    Cancer Mother        Heart disease Mother        Heart failure Mother        Liver disease Father             secondary to alcohol    Diabetes Maternal Grandmother        Allergies Son        Asthma Son        Urolithiasis Neg Hx        Prostate cancer Neg Hx        Kidney cancer Neg Hx        Eczema Neg Hx        Immunodeficiency Neg Hx        Angioedema Neg Hx        Allergic rhinitis Neg Hx        Atopy Neg Hx        Rhinitis Neg Hx        Urticaria Neg Hx               Current Medications      Current Outpatient Medications:     aspirin (ECOTRIN) 81 MG EC tablet, Take by mouth. 1 Tablet, Delayed Release (E.C.) Oral Every day, Disp: , Rfl:     atorvastatin (LIPITOR) 40 MG tablet, Take 1 tablet (40 mg  total) by mouth once daily., Disp: 90 tablet, Rfl: 3    azelastine (ASTELIN) 137 mcg (0.1 %) nasal spray, SPRAY 1 SPRAY IN EACH NOSTRIL 2 TIMES A DAY, Disp: 90 mL, Rfl: 3    ciclopirox (PENLAC) 8 % Soln, Apply topically nightly., Disp: 6.6 mL, Rfl: PRN    citalopram (CELEXA) 20 MG tablet, TAKE 1 TABLET BY MOUTH EVERY DAY, Disp: 90 tablet, Rfl: 3    fluticasone propionate (FLONASE) 50 mcg/actuation nasal spray, 1 SPRAY (50 MCG TOTAL) BY EACH NOSTRIL ROUTE ONCE DAILY., Disp: 48 mL, Rfl: PRN    gabapentin (NEURONTIN) 300 MG capsule, Take 1 capsule (300 mg total) by mouth every evening., Disp: 30 capsule, Rfl: 1    meloxicam (MOBIC) 15 MG tablet, Take 1 tablet (15 mg total) by mouth once daily., Disp: 30 tablet, Rfl: 0    mometasone 0.1% (ELOCON) 0.1 % cream, Apply topically 2 (two) times daily as needed., Disp: 15 g, Rfl: 3    multivitamin (THERAGRAN) per tablet, Take 1 tablet by mouth once daily., Disp: , Rfl:     potassium citrate (UROCIT-K 15) 15 mEq TbSR, TAKE 15 MEQ BY MOUTH 2 (TWO) TIMES A DAY. TAKE TWICE DAILY FOR STONE PREVENTION. WILL SEE IN STILL, Disp: 180 tablet, Rfl: 3    tamsulosin (FLOMAX) 0.4 mg Cap, Take 2 capsules (0.8 mg total) by mouth once daily. Take 1 nightly to help pass stone, Disp: 180 capsule, Rfl: 3    testosterone (ANDROGEL) 1.62 % (20.25 mg/1.25 gram) GlPk, Place 2.5 g onto the skin once daily., Disp: 60 packet, Rfl: 3    traMADoL (ULTRAM) 50 mg tablet, Take 1 tablet (50 mg total) by mouth every 6 (six) hours as needed for Pain., Disp: 10 tablet, Rfl: 0    traZODone (DESYREL) 50 MG tablet, Take 50 mg by mouth., Disp: , Rfl:   No current facility-administered medications for this visit.     Facility-Administered Medications Ordered in Other Visits:     fentaNYL 50 mcg/mL injection 100 mcg, 100 mcg, Intravenous, PRN, Hayley Edge PA-C, 100 mcg at 04/19/24 0959    midazolam injection 1 mg, 1 mg, Intravenous, PRN, Hayley Edge PA-C, 2 mg at 04/19/24 0959       "     Review of Systems:  As per HPI otherwise noncontributory     OBJECTIVE:       Vital Signs (Most Recent):  Vitals       Vitals:     03/17/25 0801   Weight: 78.5 kg (173 lb 1 oz)   Height: 5' 8" (1.727 m)         Body mass index is 26.31 kg/m².        Physical Exam:  Constitutional: The patient appears well-developed and well-nourished. No distress.   Skin: No lesions appreciated  Head: Normocephalic and atraumatic.   Nose: Nose normal.   Ears: No deformities seen  Eyes: Conjunctivae and EOM are normal.   Neck: No tracheal deviation present.   Cardiovascular: Normal rate and intact distal pulses.    Pulmonary/Chest: Effort normal. No respiratory distress.   Abdominal: There is no guarding.   Neurological: The patient is alert.   Psychiatric: The patient has a normal mood and affect.      Left Hand/Wrist Examination:     Observation/Inspection:  Swelling                       none                  Deformity                     none  Discoloration               none                  Scars                           left radial wrist, well-healed                  Atrophy                        none  TTP over the left dorsal wrist     HAND/WRIST EXAMINATION:  Finkelstein's Test                                Neg  WHAT Test                                         Neg  Snuff box tenderness                          Neg  Cervantes's Test                                     Neg  Hook of Hamate Tenderness              Neg  CMC grind                                           Neg  Circumduction test                              Neg     Neurovascular Exam:  Digits WWP, brisk CR < 3s throughout  NVI motor/LTS to M/R/U nerves, radial pulse 2+  Tinel's Test - Carpal Tunnel                positive  Tinel's Test - Cubital Tunnel               positive  Phalen's Test                                      Neg  Median Nerve Compression TestNeg     ROM hand full, painless     ROM wrist limited due to degenerative changes, decreased " flexion, extension, radial and ulnar deviation    ROM elbow full, painless     Abdomen not guarded  Respirations nonlabored  Perfusion intact     Diagnostic Results:     EMG -   Conclusion:      Abnormal study     EMG and nerve conduction study of the LEFT upper extremity revealed the following:      LEFT profoundly severe median mononeuropathy at the wrist (carpal tunnel syndrome)  Acute on chronic left C5, C6 cervical radiculopathy     There was no electrodiagnostic evidence of plexopathy, peripheral polyneuropathy or myopathy.     PLAN: Recommended bracing of the involved wrist at night and f/u with referring provider for additional management.     Imaging - I independently viewed the patient's imaging as well as the radiology report.  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     ASSESSMENT/PLAN:       69 y.o. yo male with SLAC wrist of the left wrist, left carpal tunnel syndrome     Plan: The patient and I had a thorough discussion today.  We discussed the working diagnosis as well as several other potential alternative diagnoses.  Treatment options were discussed, both conservative and surgical.  Conservative treatment options would include things such as activity modifications, workplace modifications, a period of rest, oral vs topical OTC and prescription anti-inflammatory medications, occupational therapy, splinting/bracing, immobilization, corticosteroid injections, and others.  Surgical options were discussed as well.      At this time, the patient has 3 main issues largely speaking.  He has persistent SLAC arthritis.  For that he would like a corticosteroid injection today.  His biggest issue at this time is his carpal tunnel syndrome which per his EMG is profoundly severe.  He would like to proceed with an open left carpal tunnel release on June 4, 2025 which I feel is reasonable.  I will get this set up.  He does have a positive Tinel's at the  left elbow but denies any involvement of the small finger or ulnar distribution and would like to proceed with a carpal tunnel release alone and defer intervention for cubital tunnel at this time.  Additionally, he is not ready for wrist salvage surgery for his advanced SLAC arthritis.  We will proceed as planned.  Referral to [facility] for his cervical radiculopathy as found on the EMG.  Follow-up for surgery.       The patient has not responded to adequate non operative treatment at this time and/or non operative treatment is not indicated. Thus, the risks, benefits and alternatives to surgery were discussed with the patient in detail.  Specific risks include but are not limited to bleeding, infection, vessel and/or nerve damage, pain, numbness, tingling, complex regional pain syndrome, compartment syndrome, failure to return to pre-injury and/or preoperative functional status, scar sensitivity, delayed healing, inability to return to work, pulley injury, tendon injury, bowstringing, partial and/or incomplete relief of symptoms, weakness, persistence of and/or worsening of symptoms, surgical failure, osteomyelitis, amputation, loss of function, stiffness, functional debility, dysfunction, decreased  strength, need for prolonged postoperative rehabilitation, need for further surgery, deep venous thrombosis, pulmonary embolism, arthritis and death.  The patient states an understanding and wishes to proceed with surgery.   All questions were answered.  No guarantees were implied or stated.  Written informed consent was obtained.     Should the patient's symptoms worsen, persist, or fail to improve they should return for reevaluation and I would be happy to see them back anytime.          Allen Silva M.D.     Please be aware that this note has been generated with the assistance of [employer] voice-to-text.  Please excuse any spelling or grammatical errors.     Thank you for choosing Dr. Allen Silva for your  orthopedic hand and upper extremity care. It is our goal to provide you with exceptional care that will help keep you healthy, active, and get you back in the game.     If you felt that you received exemplary care today, please consider leaving feedback for Dr. Silva on [employer] at [email or web address].     Please do not hesitate to reach out to us via email, phone, or MyChart with any questions, concerns, or feedback.

## 2025-03-17 NOTE — PROCEDURES
Intermediate Joint Aspiration/Injection: L radiocarpal    Date/Time: 3/17/2025 9:45 AM    Performed by: Allen Silva MD  Authorized by: Allen Silva MD    Consent Done?:  Yes (Verbal)  Indications:  Pain  Site marked: The procedure site was marked    Timeout: Prior to procedure the correct patient, procedure, and site was verified      Location:  Wrist  Site:  L radiocarpal  Prep: Patient was prepped and draped in usual sterile fashion    Ultrasonic Guidance for needle placement: No  Needle size:  25 G  Approach:  Dorsal  Medications:  40 mg triamcinolone acetonide 40 mg/mL  Patient tolerance:  Patient tolerated the procedure well with no immediate complications

## 2025-03-18 ENCOUNTER — PATIENT MESSAGE (OUTPATIENT)
Dept: FAMILY MEDICINE | Facility: CLINIC | Age: 70
End: 2025-03-18

## 2025-03-18 ENCOUNTER — TELEPHONE (OUTPATIENT)
Dept: FAMILY MEDICINE | Facility: CLINIC | Age: 70
End: 2025-03-18

## 2025-03-18 ENCOUNTER — OFFICE VISIT (OUTPATIENT)
Dept: FAMILY MEDICINE | Facility: CLINIC | Age: 70
End: 2025-03-18
Payer: MEDICARE

## 2025-03-18 VITALS
WEIGHT: 183 LBS | TEMPERATURE: 98 F | DIASTOLIC BLOOD PRESSURE: 60 MMHG | HEIGHT: 68 IN | SYSTOLIC BLOOD PRESSURE: 112 MMHG | BODY MASS INDEX: 27.74 KG/M2 | OXYGEN SATURATION: 96 % | RESPIRATION RATE: 16 BRPM | HEART RATE: 60 BPM

## 2025-03-18 DIAGNOSIS — M10.9 GOUT, ARTHRITIS: ICD-10-CM

## 2025-03-18 DIAGNOSIS — I70.0 AORTIC ATHEROSCLEROSIS: ICD-10-CM

## 2025-03-18 DIAGNOSIS — G47.33 OSA ON CPAP: ICD-10-CM

## 2025-03-18 DIAGNOSIS — I10 ESSENTIAL HYPERTENSION: Primary | ICD-10-CM

## 2025-03-18 DIAGNOSIS — T75.3XXS MOTION SICKNESS, SEQUELA: ICD-10-CM

## 2025-03-18 DIAGNOSIS — F41.1 GAD (GENERALIZED ANXIETY DISORDER): ICD-10-CM

## 2025-03-18 DIAGNOSIS — N40.0 BENIGN PROSTATIC HYPERPLASIA, UNSPECIFIED WHETHER LOWER URINARY TRACT SYMPTOMS PRESENT: ICD-10-CM

## 2025-03-18 DIAGNOSIS — E78.2 MIXED HYPERLIPIDEMIA: ICD-10-CM

## 2025-03-18 DIAGNOSIS — G47.00 INSOMNIA, UNSPECIFIED TYPE: ICD-10-CM

## 2025-03-18 DIAGNOSIS — G47.33 OSA ON CPAP: Primary | ICD-10-CM

## 2025-03-18 PROCEDURE — G2211 COMPLEX E/M VISIT ADD ON: HCPCS | Mod: S$PBB,,, | Performed by: FAMILY MEDICINE

## 2025-03-18 PROCEDURE — 99213 OFFICE O/P EST LOW 20 MIN: CPT | Mod: PBBFAC,PO | Performed by: FAMILY MEDICINE

## 2025-03-18 PROCEDURE — 99214 OFFICE O/P EST MOD 30 MIN: CPT | Mod: S$PBB,,, | Performed by: FAMILY MEDICINE

## 2025-03-18 PROCEDURE — 99999 PR PBB SHADOW E&M-EST. PATIENT-LVL III: CPT | Mod: PBBFAC,,, | Performed by: FAMILY MEDICINE

## 2025-03-18 RX ORDER — TRAZODONE HYDROCHLORIDE 50 MG/1
50 TABLET ORAL NIGHTLY PRN
Qty: 90 TABLET | Status: SHIPPED | OUTPATIENT
Start: 2025-03-18

## 2025-03-18 RX ORDER — CITALOPRAM 10 MG/1
10 TABLET ORAL DAILY
COMMUNITY
Start: 2025-03-18

## 2025-03-18 RX ORDER — SCOPOLAMINE 1 MG/3D
1 PATCH, EXTENDED RELEASE TRANSDERMAL
Qty: 24 PATCH | Refills: 0 | Status: SHIPPED | OUTPATIENT
Start: 2025-03-18

## 2025-03-18 NOTE — PROGRESS NOTES
Subjective:       Patient ID: Ang Oden Jr. is a 69 y.o. male.    Chief Complaint: Follow-up (6mth f/u)    Problem List[1]  Patient is here for a chronic conditions follow up.    Reviewed labs 1/2025  History of Present Illness    CHIEF COMPLAINT:  Mr. Oden presents today for follow up of carpal tunnel syndrome.    CARPAL TUNNEL SYNDROME:  He reports intermittent numbness occurring a couple days per week with associated neck pain. He previously experienced difficulty with  strength and inability to  objects including medications and money due to loss of sensation. Nerve conduction study showed severe carpal tunnel compression.    FATIGUE AND HORMONE THERAPY:  He reports persistent fatigue. He is currently using testosterone gel which was recently doubled to two packs daily due to lack of improvement. He denies problems with libido.    MEDICATIONS:  He has not filled Trazodone prescription for sleep since August 2023.    LABS:  Recent labs showed normal blood cell counts. Testosterone and PSA levels were checked for monitoring while on testosterone therapy.      ROS:  ROS findings as noted in HPI. Dr. Silva ortho planning CTS left release 6/4/25        Review of Systems   Constitutional:  Negative for fatigue and unexpected weight change.   Respiratory:  Negative for chest tightness and shortness of breath.    Cardiovascular:  Negative for chest pain, palpitations and leg swelling.   Gastrointestinal:  Negative for abdominal pain.   Musculoskeletal:  Negative for arthralgias.   Neurological:  Negative for dizziness, syncope, light-headedness and headaches.      Relevant History:  Phys med Dr. Cruz left CTS     Heme/onc mild chronic anemia , nl iron     Derm Dr. Charles treating basal cell CA and AK     Ortho Dr. Silva treating left wrist arthritis. Steroid injections help some. Takes meloxicam prn. C/o lebron wrist pain left > right. 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 S/p carpectomy 4/19/24 left.  Did PT . Still having weakness of , pain in left wrist and hand, numbness in left digits 1-4.  Has also neck stiffness and pain daily   -Uric acid elevated. Rare gout attacks- off allopurinol     Card Dr. EVELYN sheriff treating KYLEIGH not using CPAP. HPL controlled   Your cholesterol is high.       urology Dr. Hartmann/now [facility] -bph , kidney stone.   Uses flomax only when has kidney stones.  No LUTS from BPH. Has been passing more stones recently.      Spine Dr. Spangler Chronic intermittent LBP. See chiropractor  Dr. lr     Psych Mood doing well on celexa. Wants to do trial off     GI Colonoscopy Dr. Santiago 2021 1 hyperplastic polyp. On 5 year surveillance  Objective:      Physical Exam  Vitals and nursing note reviewed.   Constitutional:       Appearance: He is well-developed.   Cardiovascular:      Rate and Rhythm: Normal rate and regular rhythm.      Heart sounds: Normal heart sounds.   Pulmonary:      Effort: Pulmonary effort is normal.      Breath sounds: Normal breath sounds.   Skin:     General: Skin is warm and dry.   Neurological:      Mental Status: He is alert and oriented to person, place, and time.         Assessment:       ICD-10-CM ICD-9-CM    1. Essential hypertension  I10 401.9       2. Mixed hyperlipidemia  E78.2 272.2 Comprehensive Metabolic Panel      Lipid Panel      3. Gout, arthritis  M10.9 274.00 Uric Acid      4. Aortic atherosclerosis  I70.0 440.0       5. Benign prostatic hyperplasia, unspecified whether lower urinary tract symptoms present  N40.0 600.00       6. KYLEIGH on CPAP  G47.33 327.23       7. Insomnia, unspecified type  G47.00 780.52 traZODone (DESYREL) 50 MG tablet      8. Motion sickness, sequela  T75.3XXS 909.4 scopolamine (TRANSDERM-SCOP) 1.3-1.5 mg (1 mg over 3 days)      9. SURAJ (generalized anxiety disorder)  F41.1 300.02 citalopram (CELEXA) 10 MG tablet         Plan:   1. Essential  hypertension (Primary)  Controlled on current medications.  Continue current medications.      2. Mixed hyperlipidemia  Controlled on current medications.  Continue current medications.  Lipitor 40mg  - Comprehensive Metabolic Panel; Future  - Lipid Panel; Future    3. Gout, arthritis  Screen and treat as indicated:    - Uric Acid; Future    4. Aortic atherosclerosis  Cont to lower risk factors    5. Benign prostatic hyperplasia, unspecified whether lower urinary tract symptoms present  Controlled on current medications.  Continue current medications.      6. KYLEIGH on CPAP  Cont cpap consistently    7. Insomnia, unspecified type  Controlled on current medications.  Continue current medications.    - traZODone (DESYREL) 50 MG tablet; Take 1 tablet (50 mg total) by mouth nightly as needed for Insomnia.  Dispense: 90 tablet; Refill: PRN    8. Motion sickness, sequela  Treat prn  - scopolamine (TRANSDERM-SCOP) 1.3-1.5 mg (1 mg over 3 days); Place 1 patch onto the skin every 72 hours.  Dispense: 24 patch; Refill: 0    9. SURAJ (generalized anxiety disorder)  Treat prn  - citalopram (CELEXA) 10 MG tablet; Take 1 tablet (10 mg total) by mouth once daily.  Assessment & Plan    - Prescribed scopolamine patches for upcoming cruise trip.  - Explained potential side effects.  - Discussed expected benefits of testosterone therapy, including improved alertness, muscle strength, and energy levels.  - Educated on physiological differences between testosterone gel and injections, noting gel mimics normal daily hormone fluctuations.  - Return for fasting labs as soon as possible.  - Ordered fasting cholesterol and uric acid tests.  - Message surgeon if considering rescheduling carpal tunnel surgery before cruise trip.  - Refilled Trazodone for sleep.  - Follow up in 6 months with labs.  - Contact office if experiencing withdrawal symptoms after discontinuing sertraline.         Time spent with patient: 20 minutes  Patient with be  reevaluated in 6 months or sooner prn  Greater than 50% of this visit was spent counseling as described in above documentation:Yes  This note was generated with the assistance of ambient listening technology. Verbal consent was obtained by the patient and accompanying visitor(s) for the recording of patient appointment to facilitate this note. I attest to having reviewed and edited the generated note for accuracy, though some syntax or spelling errors may persist. Please contact the author of this note for any clarification.            [1]   Patient Active Problem List  Diagnosis    Olecranon bursitis    Gout, arthritis    Hyperlipidemia    Anemia, Hgb 13.1    Lumbago due to displacement of intervertebral disc    Male hypogonadism    Fatty liver    KYLEIGH (obstructive sleep apnea), not using CPAP    Kidney calculi    BPH (benign prostatic hypertrophy)    Diverticulosis    Cardiovascular risk factor, FCVRS 5% on Zocor    Insomnia    Anxiety    Allergic rhinitis    Essential hypertension    History of alcohol abuse    Dysphagia    Pre-hypertension    Aortic atherosclerosis    KYLEIGH on CPAP    Normocytic anemia    SURAJ (generalized anxiety disorder)    Scapholunate advanced collapse of left wrist    Left hand pain    Stiffness of left hand joint    Left hand weakness    Effusion of left hand    Carpal tunnel syndrome of left wrist    Cervical radiculopathy    Cubital tunnel syndrome on left

## 2025-03-18 NOTE — TELEPHONE ENCOUNTER
----- Message from Vanessa sent at 3/18/2025  2:49 PM CDT -----  Regarding: Pt question  Hello, patient came in this morning for an appointment and forgot to ask a question. He was wondering if you could give him a call to discuss a question he had. He can be reached by his primary phone number on file. Thank you

## 2025-03-18 NOTE — TELEPHONE ENCOUNTER
Spoke to pt who states he was seen this am and he forgot to ask about his sleep machine. Pt states he was advised he has sleep apnea but was never called regarding CPAP machine. Pt wanting to know the next steps in sleep apnea.

## 2025-03-19 ENCOUNTER — PATIENT MESSAGE (OUTPATIENT)
Dept: FAMILY MEDICINE | Facility: CLINIC | Age: 70
End: 2025-03-19
Payer: MEDICARE

## 2025-03-19 ENCOUNTER — LAB VISIT (OUTPATIENT)
Dept: LAB | Facility: HOSPITAL | Age: 70
End: 2025-03-19
Attending: FAMILY MEDICINE
Payer: MEDICARE

## 2025-03-19 DIAGNOSIS — M10.9 GOUT, ARTHRITIS: ICD-10-CM

## 2025-03-19 DIAGNOSIS — E78.2 MIXED HYPERLIPIDEMIA: ICD-10-CM

## 2025-03-19 LAB
ALBUMIN SERPL BCP-MCNC: 3.6 G/DL (ref 3.5–5.2)
ALP SERPL-CCNC: 73 U/L (ref 40–150)
ALT SERPL W/O P-5'-P-CCNC: 29 U/L (ref 10–44)
ANION GAP SERPL CALC-SCNC: 12 MMOL/L (ref 8–16)
AST SERPL-CCNC: 26 U/L (ref 10–40)
BILIRUB SERPL-MCNC: 0.4 MG/DL (ref 0.1–1)
BUN SERPL-MCNC: 15 MG/DL (ref 8–23)
CALCIUM SERPL-MCNC: 9.4 MG/DL (ref 8.7–10.5)
CHLORIDE SERPL-SCNC: 109 MMOL/L (ref 95–110)
CHOLEST SERPL-MCNC: 154 MG/DL (ref 120–199)
CHOLEST/HDLC SERPL: 2.8 {RATIO} (ref 2–5)
CO2 SERPL-SCNC: 23 MMOL/L (ref 23–29)
CREAT SERPL-MCNC: 0.9 MG/DL (ref 0.5–1.4)
EST. GFR  (NO RACE VARIABLE): >60 ML/MIN/1.73 M^2
GLUCOSE SERPL-MCNC: 80 MG/DL (ref 70–110)
HDLC SERPL-MCNC: 55 MG/DL (ref 40–75)
HDLC SERPL: 35.7 % (ref 20–50)
LDLC SERPL CALC-MCNC: 82.6 MG/DL (ref 63–159)
NONHDLC SERPL-MCNC: 99 MG/DL
POTASSIUM SERPL-SCNC: 4 MMOL/L (ref 3.5–5.1)
PROT SERPL-MCNC: 7.2 G/DL (ref 6–8.4)
SODIUM SERPL-SCNC: 144 MMOL/L (ref 136–145)
TRIGL SERPL-MCNC: 82 MG/DL (ref 30–150)
URATE SERPL-MCNC: 7.3 MG/DL (ref 3.4–7)

## 2025-03-19 PROCEDURE — 80061 LIPID PANEL: CPT | Performed by: FAMILY MEDICINE

## 2025-03-19 PROCEDURE — 80053 COMPREHEN METABOLIC PANEL: CPT | Performed by: FAMILY MEDICINE

## 2025-03-19 PROCEDURE — 84550 ASSAY OF BLOOD/URIC ACID: CPT | Performed by: FAMILY MEDICINE

## 2025-03-19 PROCEDURE — 36415 COLL VENOUS BLD VENIPUNCTURE: CPT | Mod: PO | Performed by: FAMILY MEDICINE

## 2025-03-19 NOTE — TELEPHONE ENCOUNTER
Sleep study showed significant obstructive sleep apnea. In lab CPAP titration recommended to determine best mask and setting for home CPAP use to treat it.

## 2025-03-23 ENCOUNTER — HOSPITAL ENCOUNTER (EMERGENCY)
Facility: HOSPITAL | Age: 70
Discharge: HOME | End: 2025-03-24
Attending: STUDENT IN AN ORGANIZED HEALTH CARE EDUCATION/TRAINING PROGRAM
Payer: MEDICARE

## 2025-03-23 VITALS
RESPIRATION RATE: 18 BRPM | TEMPERATURE: 98 F | DIASTOLIC BLOOD PRESSURE: 77 MMHG | WEIGHT: 180 LBS | HEIGHT: 68 IN | BODY MASS INDEX: 27.28 KG/M2 | SYSTOLIC BLOOD PRESSURE: 115 MMHG | OXYGEN SATURATION: 96 % | HEART RATE: 72 BPM

## 2025-03-23 DIAGNOSIS — S99.919A ANKLE INJURY: ICD-10-CM

## 2025-03-23 PROCEDURE — 99283 EMERGENCY DEPT VISIT LOW MDM: CPT | Mod: 25

## 2025-03-23 PROCEDURE — 25000003 PHARM REV CODE 250: Performed by: STUDENT IN AN ORGANIZED HEALTH CARE EDUCATION/TRAINING PROGRAM

## 2025-03-23 RX ORDER — ACETAMINOPHEN 500 MG
1000 TABLET ORAL
Status: COMPLETED | OUTPATIENT
Start: 2025-03-23 | End: 2025-03-23

## 2025-03-23 RX ORDER — OXYCODONE HYDROCHLORIDE 5 MG/1
5 TABLET ORAL
Refills: 0 | Status: COMPLETED | OUTPATIENT
Start: 2025-03-23 | End: 2025-03-23

## 2025-03-23 RX ADMIN — ACETAMINOPHEN 1000 MG: 500 TABLET ORAL at 10:03

## 2025-03-23 RX ADMIN — OXYCODONE HYDROCHLORIDE 5 MG: 5 TABLET ORAL at 10:03

## 2025-03-24 ENCOUNTER — PROCEDURE VISIT (OUTPATIENT)
Dept: SLEEP MEDICINE | Facility: HOSPITAL | Age: 70
End: 2025-03-24
Attending: FAMILY MEDICINE
Payer: MEDICARE

## 2025-03-24 ENCOUNTER — RESULTS FOLLOW-UP (OUTPATIENT)
Dept: FAMILY MEDICINE | Facility: CLINIC | Age: 70
End: 2025-03-24

## 2025-03-24 DIAGNOSIS — G47.33 OSA ON CPAP: ICD-10-CM

## 2025-03-24 PROCEDURE — 95811 POLYSOM 6/>YRS CPAP 4/> PARM: CPT

## 2025-03-24 RX ORDER — OXYCODONE HYDROCHLORIDE 5 MG/1
5 TABLET ORAL EVERY 6 HOURS PRN
Qty: 3 TABLET | Refills: 0 | Status: SHIPPED | OUTPATIENT
Start: 2025-03-24

## 2025-03-24 NOTE — ED PROVIDER NOTES
Encounter Date: 3/23/2025       History     Chief Complaint   Patient presents with    Ankle Pain     Pt c/o twisted left ankle     HPI  69-year-old presenting with left ankle pain.  Reports that he stepped off of something incorrectly and twisted his left ankle and subsequently had pain.  Did not fallen hit his head.  No pain anywhere else.  Review of patient's allergies indicates:   Allergen Reactions    No known drug allergies      Past Medical History:   Diagnosis Date    Anemia     Anticoagulant long-term use     Chronic kidney disease     Chronic rhinitis     Disc     lower back    DJD (degenerative joint disease)     Foot pain     left foot    Gout, unspecified     HBP (high blood pressure)     High cholesterol     Kidney stone     Sleep apnea     Weight loss     15 pounds in two months     Past Surgical History:   Procedure Laterality Date    APPENDECTOMY      CARPECTOMY Left 4/19/2024    Procedure: CARPECTOMY - left radial styloidectomy and partial scaphoid excision;  Surgeon: Allen Silva MD;  Location: [facility] OR;  Service: Orthopedics;  Laterality: Left;    COLONOSCOPY N/A 11/11/2021    Procedure: COLONOSCOPY;  Surgeon: Christian Santiago MD;  Location: [facility];  Service: Endoscopy;  Laterality: N/A;    ELBOW SURGERY      REMOVAL  4/19/2024    Procedure: REMOVAL, STYLOID BONE;  Surgeon: Allen Silva MD;  Location: [facility] OR;  Service: Orthopedics;;    VASECTOMY       Family History   Problem Relation Name Age of Onset    Cancer Mother      Heart disease Mother      Heart failure Mother      Liver disease Father          secondary to alcohol    Diabetes Maternal Grandmother      Allergies Son      Asthma Son      Urolithiasis Neg Hx      Prostate cancer Neg Hx      Kidney cancer Neg Hx      Eczema Neg Hx      Immunodeficiency Neg Hx      Angioedema Neg Hx      Allergic rhinitis Neg Hx      Atopy Neg Hx      Rhinitis Neg Hx      Urticaria Neg Hx       Social History[1]  Review of Systems  See hpi    Physical Exam     Initial Vitals [03/23/25 2148]   BP Pulse Resp Temp SpO2   130/83 84 18 98.2 °F (36.8 °C) 100 %      MAP       --         Physical Exam    Nursing note and vitals reviewed.  Constitutional: He appears well-developed and well-nourished. He is not diaphoretic.   Answering questions in full sentences without increased work of breathing.    HENT:   Head: Normocephalic.   Eyes: Right eye exhibits no discharge. Left eye exhibits no discharge. No scleral icterus.   Neck: Neck supple. No tracheal deviation present.   Normal range of motion.  Cardiovascular:  Normal rate and regular rhythm.           Pulmonary/Chest: No stridor. No respiratory distress. He has no wheezes. He has no rhonchi. He has no rales. He exhibits no tenderness.   Abdominal: Abdomen is soft. There is no abdominal tenderness. There is no rebound and no guarding.   Musculoskeletal:         General: Tenderness (L ankle pain worse posterior to lateral malleolus. edema and ecchymosis present to lateral foot and distal lateral ankle. 2+ dp. Pt able to dorsiflex, plantar flex, david and invert ankle with some mild pain but range of motion intact) present. No edema.      Cervical back: Normal range of motion and neck supple.     Neurological: He is alert and oriented to person, place, and time.   Moving all extremities   Skin: Skin is warm and dry.         ED Course   Procedures  Labs Reviewed - No data to display       Imaging Results              X-Ray Ankle Complete Left (Final result)  Result time 03/23/25 22:24:13      Final result by rBigitte Morales MD (03/23/25 22:24:13)                   Impression:      No acute fracture.  Chronic osseous excrescence along the dorsal talar head/neck.  Joint effusion and ankle swelling.      Electronically signed by: Brigitte Morales  Date:    03/23/2025  Time:    22:24               Narrative:    EXAMINATION:  XR ANKLE COMPLETE 3 VIEW LEFT    CLINICAL HISTORY:  Unspecified injury of  unspecified ankle, initial encounter    TECHNIQUE:  AP, lateral and oblique views of the left ankle were performed.    COMPARISON:  None    FINDINGS:  See below                                       Medications   oxyCODONE immediate release tablet 5 mg (5 mg Oral Given 3/23/25 2257)   acetaminophen tablet 1,000 mg (1,000 mg Oral Given 3/23/25 2257)     Medical Decision Making  Risk  OTC drugs.  Prescription drug management.      Per radiology No acute fracture. Chronic osseous excrescence along the dorsal talar head/neck. Joint effusion and ankle swelling.     Range of motion intact.  Lower suspicion for tendon disruption but discussed with patient that if he finds that his range of motion is not his baseline that he may need MRI and to please follow up with his primary care doctor in the next 2-3 days.  If his pain persists he also may need repeat x-ray in 1 week and this is discussed with the patient.  Given crutches and walking boot.  Discussed pain control with scheduled Tylenol and ibuprofen for 1 week and if uncontrolled pain given 3 doses of oxycodone and discussed benefits and risks of this medication.    Strict return precautions discussed    Anali Ferreira MD  Emergency Medicine Staff Physician                                      Clinical Impression:  Final diagnoses:  [S99.919A] Ankle injury          ED Disposition Condition    Discharge Stable          ED Prescriptions       Medication Sig Dispense Start Date End Date Auth. Provider    oxyCODONE (ROXICODONE) 5 MG immediate release tablet Take 1 tablet (5 mg total) by mouth every 6 (six) hours as needed for Pain (If 1000 mg of Tylenol 400 mg of ibuprofen do not control your pain). 3 tablet 3/24/2025 -- Anali Ferreira MD          Follow-up Information       Follow up With Specialties Details Why Contact Info Additional Information    [facility] - Emergency Dept Emergency Medicine Go to  Return to an emergency department immediately if  you develop persisting or worsening symptoms or with any new symptoms such as fevers, chills, inability to eat or drink, uncontrollable pain, headaches, chagnes in vision, nausea, vomiting, stomach pain [address] [facility] [address] [phone] 1st floor                 [1]   Social History  Tobacco Use    Smoking status: Former     Current packs/day: 0.00     Types: Cigarettes     Quit date: 9/10/1982     Years since quittin.5    Smokeless tobacco: Never   Substance Use Topics    Alcohol use: Not Currently    Drug use: Yes     Types: Marijuana     Comment: occassioanlly, had one last week        Anali Ferreira MD  25 0424

## 2025-03-24 NOTE — DISCHARGE INSTRUCTIONS
Your XR results today: No acute fracture.  Chronic osseous excrescence along the dorsal talar head/neck.  Joint effusion and ankle swelling.     As we discussed please stay in the supportive boot and use your crutches until you are able to follow up with the primary care doctor within 3-5 days.  If your pain is still severe then you will need to have repeat x-ray within 1 week to check for a small fracture that may not have shown up on today's x-ray.  Today you had good range of motion of your ankle in your foot but if you have trouble moving your foot or ankle then you may need further testing such as an MRI and orthopedic follow up, please discuss with the primary care doctor.    For your pain please take 1000 mg of Tylenol and 400 mg of ibuprofen every 6 hours.  Please make sure that there were is no Tylenol also called acetaminophen in any other medication that you take because overdosing on Tylenol can kill you.  If your pain over the next day or 2 is still uncontrolled then you has been discharged with a few doses of oxycodone.  Please do not operate motor vehicles or make important decisions and please be aware that you were at increased risk of falling while taking this medication.  Please only use sparingly and if you have any extra then please throw them away.  Do not combine oxycodone with sleeping medication or other opioid medications such as tramadol. Please discuss further pain control with the primary care doctor.  Do not take Tylenol and ibuprofen every 6 hours for more than 1 week without discussing with your primary care doctor

## 2025-04-02 ENCOUNTER — PATIENT MESSAGE (OUTPATIENT)
Dept: FAMILY MEDICINE | Facility: CLINIC | Age: 70
End: 2025-04-02
Payer: MEDICARE

## 2025-04-02 DIAGNOSIS — G47.33 OSA ON CPAP: Primary | ICD-10-CM

## 2025-04-15 ENCOUNTER — RESULTS FOLLOW-UP (OUTPATIENT)
Dept: FAMILY MEDICINE | Facility: CLINIC | Age: 70
End: 2025-04-15

## 2025-04-15 DIAGNOSIS — B35.6 TINEA CRURIS: ICD-10-CM

## 2025-04-15 NOTE — TELEPHONE ENCOUNTER
No care due was identified.  [facility] Embedded Care Due Messages. Reference number: [id number].   4/15/2025 5:32:12 PM CDT

## 2025-04-16 RX ORDER — MOMETASONE FUROATE 1 MG/G
CREAM TOPICAL 2 TIMES DAILY PRN
Qty: 15 G | Refills: 3 | Status: SHIPPED | OUTPATIENT
Start: 2025-04-16 | End: 2025-05-16

## 2025-05-22 ENCOUNTER — PATIENT MESSAGE (OUTPATIENT)
Dept: PREADMISSION TESTING | Facility: HOSPITAL | Age: 70
End: 2025-05-22
Payer: MEDICARE

## 2025-05-22 NOTE — ANESTHESIA PAT ROS NOTE
05/22/2025  Ang Oden Jr. is a 69 y.o., male.      Pre-op Assessment    I have reviewed the Patient Summary Reports.       I have reviewed the Medications.     Review of Systems  Anesthesia Hx:  No problems with previous Anesthesia   History of prior surgery of interest to airway management or planning:  Previous anesthesia: MAC, Nerve Block      for Carpectomy 4/19/24.  Procedure performed at an [facility].  Carpectomy 4/19/24 with MAC.  Procedure performed at an [facility].     Social:  Former Smoker, No Alcohol Use Marijuana use      Hematology/Oncology:       -- Anemia:                    --  Cancer in past history:       Other (see Oncology comments)          Oncology Comments: Basal cell carcinoma     EENT/Dental:  chronic allergic rhinitis           Cardiovascular:     Hypertension           hyperlipidemia    Aortic atherosclerosis                           Pulmonary:        Sleep Apnea                Renal/:  Chronic Renal Disease, CKD renal calculi BPH              Hepatic/GI:      Liver Disease,  Fatty liver  Diverticulosis             Musculoskeletal:  Arthritis   Carpal tunnel syndrome of left wrist  Scapholunate advanced collapse of left wrist  Cervical radiculopathy  Lumbago due to displacement of intervertebral disc  Gout       Spine Disorders: cervical and lumbar            Psych:   anxiety depression                 Past Medical History:   Diagnosis Date    Anemia     Anticoagulant long-term use     Chronic kidney disease     Chronic rhinitis     Disc     lower back    DJD (degenerative joint disease)     Foot pain     left foot    Gout, unspecified     HBP (high blood pressure)     High cholesterol     Kidney stone     Sleep apnea     Weight loss     15 pounds in two months     Past Surgical History:   Procedure Laterality Date    APPENDECTOMY      CARPECTOMY Left  4/19/2024    Procedure: CARPECTOMY - left radial styloidectomy and partial scaphoid excision;  Surgeon: Allen Silva MD;  Location: [facility] OR;  Service: Orthopedics;  Laterality: Left;    COLONOSCOPY N/A 11/11/2021    Procedure: COLONOSCOPY;  Surgeon: Christian Santiago MD;  [facility]: [facility] ENDO;  Service: Endoscopy;  Laterality: N/A;    ELBOW SURGERY      REMOVAL  4/19/2024    Procedure: REMOVAL, STYLOID BONE;  Surgeon: Allen Silva MD;  Location: [facility] OR;  Service: Orthopedics;;    VASECTOMY       Anesthesia Assessment: Preoperative EQUATION    Planned Procedure: Procedure(s) (LRB):  RELEASE, CARPAL TUNNEL - left open (Left)  Requested Anesthesia Type:Local MAC  Surgeon: Allen Silva MD  Service: Orthopedics  Known or anticipated Date of Surgery:6/4/2025    Surgeon notes: reviewed    Electronic QUestionnaire Assessment completed via nurse interview with patient.      Triage considerations:     The patient has no apparent active cardiac condition (No unstable coronary Syndrome such as severe unstable angina or recent [<1 month] myocardial infarction, decompensated CHF, severe valvular   disease or significant arrhythmia)    Previous anesthesia records:MAC, Nerve block for post-op pain, and No problems    Last PCP note: within 3 months   Subspecialty notes: Ortho, PM&R, Spine Service, Urology    Other important co-morbidities: HLD, HTN, and KYLEIGH   EKG 10/21/24  Vent. Rate : 062 BPM     Atrial Rate : 062 BPM      P-R Int : 180 ms          QRS Dur : 084 ms       QT Int : 460 ms       P-R-T Axes : 046 -23 034 degrees      QTc Int : 466 ms     Sinus rhythm with Premature atrial complexes   Otherwise normal ECG   When compared with ECG of 07-JUL-2022 16:52,   Premature atrial complexes are now Present   Confirmed by Roberto GARCIA, Manuel ([id number]) on 11/4/2024 8:16:22 PM     TTE 2/08/21  The left ventricle is normal in size with normal systolic function. The estimated ejection fraction is 60%  Normal left ventricular  "diastolic function.  Normal right ventricular size with normal right ventricular systolic function.  Mild tricuspid regurgitation.     Stress Test 6/25/19    The EKG portion of this study is negative for ischemia.    Arrhythmias during stress: rare PVCs.    The patient reported no chest pain during the stress test.    Tests already available:  Results have been reviewed.             Instructions given. (See in Nurse's note) Pre op medication instructions sent via portal message.    Optimization:  Anesthesia Preop Clinic Assessment not Indicated    Medical Opinion Indicated: no       Sub-specialist consult indicated: no      Plan:  No pre op medical clearance requested.    Navigation:  Straight Line to surgery.               No tests, anesthesia preop clinic visit, or consult required.    Ht: 5'8"  Wt: 81.6 kg (180 lb)  BMI: 26.31  "

## 2025-05-28 ENCOUNTER — RESULTS FOLLOW-UP (OUTPATIENT)
Dept: UROLOGY | Facility: CLINIC | Age: 70
End: 2025-05-28

## 2025-05-28 ENCOUNTER — HOSPITAL ENCOUNTER (OUTPATIENT)
Dept: RADIOLOGY | Facility: HOSPITAL | Age: 70
Discharge: HOME | End: 2025-05-28
Attending: UROLOGY
Payer: MEDICARE

## 2025-05-28 ENCOUNTER — HOSPITAL ENCOUNTER (OUTPATIENT)
Dept: RADIOLOGY | Facility: HOSPITAL | Age: 70
Discharge: HOME | End: 2025-05-28
Attending: NURSE PRACTITIONER
Payer: MEDICARE

## 2025-05-28 DIAGNOSIS — N20.0 KIDNEY STONES: Primary | ICD-10-CM

## 2025-05-28 DIAGNOSIS — N20.0 NEPHROLITHIASIS: ICD-10-CM

## 2025-05-28 DIAGNOSIS — N20.0 KIDNEY STONES: ICD-10-CM

## 2025-05-28 DIAGNOSIS — N20.0 KIDNEY STONE: ICD-10-CM

## 2025-05-28 PROCEDURE — 74018 RADEX ABDOMEN 1 VIEW: CPT | Mod: TC

## 2025-05-28 PROCEDURE — 76770 US EXAM ABDO BACK WALL COMP: CPT | Mod: 26,,, | Performed by: RADIOLOGY

## 2025-05-28 PROCEDURE — 76770 US EXAM ABDO BACK WALL COMP: CPT | Mod: TC

## 2025-05-28 PROCEDURE — 74018 RADEX ABDOMEN 1 VIEW: CPT | Mod: 26,,, | Performed by: RADIOLOGY

## 2025-05-29 DIAGNOSIS — N20.0 KIDNEY STONES: Primary | ICD-10-CM

## 2025-05-30 ENCOUNTER — RESULTS FOLLOW-UP (OUTPATIENT)
Dept: UROLOGY | Facility: CLINIC | Age: 70
End: 2025-05-30

## 2025-05-30 ENCOUNTER — HOSPITAL ENCOUNTER (OUTPATIENT)
Dept: RADIOLOGY | Facility: HOSPITAL | Age: 70
Discharge: HOME | End: 2025-05-30
Attending: NURSE PRACTITIONER
Payer: MEDICARE

## 2025-05-30 DIAGNOSIS — N20.0 KIDNEY STONES: ICD-10-CM

## 2025-05-30 PROCEDURE — 74176 CT ABD & PELVIS W/O CONTRAST: CPT | Mod: 26,,, | Performed by: RADIOLOGY

## 2025-05-30 PROCEDURE — 74176 CT ABD & PELVIS W/O CONTRAST: CPT | Mod: TC

## 2025-06-03 ENCOUNTER — OFFICE VISIT (OUTPATIENT)
Dept: UROLOGY | Facility: CLINIC | Age: 70
End: 2025-06-03
Payer: MEDICARE

## 2025-06-03 ENCOUNTER — TELEPHONE (OUTPATIENT)
Dept: ORTHOPEDICS | Facility: CLINIC | Age: 70
End: 2025-06-03
Payer: MEDICARE

## 2025-06-03 ENCOUNTER — ANESTHESIA EVENT (OUTPATIENT)
Dept: SURGERY | Facility: HOSPITAL | Age: 70
End: 2025-06-03
Payer: MEDICARE

## 2025-06-03 ENCOUNTER — PATIENT MESSAGE (OUTPATIENT)
Dept: ORTHOPEDICS | Facility: CLINIC | Age: 70
End: 2025-06-03
Payer: MEDICARE

## 2025-06-03 DIAGNOSIS — E29.1 HYPOGONADISM IN MALE: ICD-10-CM

## 2025-06-03 DIAGNOSIS — N20.0 KIDNEY STONES: Primary | ICD-10-CM

## 2025-06-03 PROCEDURE — G2211 COMPLEX E/M VISIT ADD ON: HCPCS | Mod: S$PBB,,, | Performed by: UROLOGY

## 2025-06-03 PROCEDURE — 99214 OFFICE O/P EST MOD 30 MIN: CPT | Mod: S$PBB,,, | Performed by: UROLOGY

## 2025-06-03 PROCEDURE — 99213 OFFICE O/P EST LOW 20 MIN: CPT | Mod: PBBFAC,PO | Performed by: UROLOGY

## 2025-06-03 PROCEDURE — 99999 PR PBB SHADOW E&M-EST. PATIENT-LVL III: CPT | Mod: PBBFAC,,, | Performed by: UROLOGY

## 2025-06-04 ENCOUNTER — ANESTHESIA (OUTPATIENT)
Dept: SURGERY | Facility: HOSPITAL | Age: 70
End: 2025-06-04
Payer: MEDICARE

## 2025-06-04 ENCOUNTER — HOSPITAL ENCOUNTER (OUTPATIENT)
Facility: HOSPITAL | Age: 70
Discharge: HOME | End: 2025-06-04
Attending: ORTHOPAEDIC SURGERY | Admitting: ORTHOPAEDIC SURGERY
Payer: MEDICARE

## 2025-06-04 VITALS
WEIGHT: 182 LBS | SYSTOLIC BLOOD PRESSURE: 115 MMHG | OXYGEN SATURATION: 96 % | BODY MASS INDEX: 28.56 KG/M2 | DIASTOLIC BLOOD PRESSURE: 75 MMHG | RESPIRATION RATE: 13 BRPM | HEART RATE: 45 BPM | HEIGHT: 67 IN | TEMPERATURE: 98 F

## 2025-06-04 DIAGNOSIS — G56.02 CARPAL TUNNEL SYNDROME OF LEFT WRIST: ICD-10-CM

## 2025-06-04 PROCEDURE — 25000003 PHARM REV CODE 250: Performed by: ORTHOPAEDIC SURGERY

## 2025-06-04 PROCEDURE — 25000003 PHARM REV CODE 250: Performed by: NURSE ANESTHETIST, CERTIFIED REGISTERED

## 2025-06-04 PROCEDURE — 94761 N-INVAS EAR/PLS OXIMETRY MLT: CPT

## 2025-06-04 PROCEDURE — 63600175 PHARM REV CODE 636 W HCPCS: Performed by: NURSE ANESTHETIST, CERTIFIED REGISTERED

## 2025-06-04 PROCEDURE — 63600175 PHARM REV CODE 636 W HCPCS: Performed by: ORTHOPAEDIC SURGERY

## 2025-06-04 PROCEDURE — 36000706: Performed by: ORTHOPAEDIC SURGERY

## 2025-06-04 PROCEDURE — 25000003 PHARM REV CODE 250: Performed by: ANESTHESIOLOGY

## 2025-06-04 PROCEDURE — 37000008 HC ANESTHESIA 1ST 15 MINUTES: Performed by: ORTHOPAEDIC SURGERY

## 2025-06-04 PROCEDURE — 99900035 HC TECH TIME PER 15 MIN (STAT)

## 2025-06-04 PROCEDURE — 71000015 HC POSTOP RECOV 1ST HR: Performed by: ORTHOPAEDIC SURGERY

## 2025-06-04 PROCEDURE — 37000009 HC ANESTHESIA EA ADD 15 MINS: Performed by: ORTHOPAEDIC SURGERY

## 2025-06-04 PROCEDURE — 71000033 HC RECOVERY, INTIAL HOUR: Performed by: ORTHOPAEDIC SURGERY

## 2025-06-04 PROCEDURE — 64721 CARPAL TUNNEL SURGERY: CPT | Mod: LT,,, | Performed by: ORTHOPAEDIC SURGERY

## 2025-06-04 PROCEDURE — 36000707: Performed by: ORTHOPAEDIC SURGERY

## 2025-06-04 RX ORDER — CELECOXIB 200 MG/1
200 CAPSULE ORAL
Status: COMPLETED | OUTPATIENT
Start: 2025-06-04 | End: 2025-06-04

## 2025-06-04 RX ORDER — HALOPERIDOL LACTATE 5 MG/ML
0.5 INJECTION, SOLUTION INTRAMUSCULAR EVERY 10 MIN PRN
Status: DISCONTINUED | OUTPATIENT
Start: 2025-06-04 | End: 2025-06-04 | Stop reason: HOSPADM

## 2025-06-04 RX ORDER — ONDANSETRON HYDROCHLORIDE 2 MG/ML
INJECTION, SOLUTION INTRAVENOUS
Status: DISCONTINUED | OUTPATIENT
Start: 2025-06-04 | End: 2025-06-04

## 2025-06-04 RX ORDER — OXYCODONE HYDROCHLORIDE 5 MG/1
5 TABLET ORAL
Status: DISCONTINUED | OUTPATIENT
Start: 2025-06-04 | End: 2025-06-04 | Stop reason: HOSPADM

## 2025-06-04 RX ORDER — MUPIROCIN 20 MG/G
OINTMENT TOPICAL 2 TIMES DAILY
Status: DISCONTINUED | OUTPATIENT
Start: 2025-06-04 | End: 2025-06-04 | Stop reason: HOSPADM

## 2025-06-04 RX ORDER — GLUCAGON 1 MG
1 KIT INJECTION
Status: DISCONTINUED | OUTPATIENT
Start: 2025-06-04 | End: 2025-06-04 | Stop reason: HOSPADM

## 2025-06-04 RX ORDER — FAMOTIDINE 10 MG/ML
INJECTION, SOLUTION INTRAVENOUS
Status: DISCONTINUED | OUTPATIENT
Start: 2025-06-04 | End: 2025-06-04

## 2025-06-04 RX ORDER — ONDANSETRON 4 MG/1
8 TABLET, ORALLY DISINTEGRATING ORAL EVERY 8 HOURS PRN
Qty: 10 TABLET | Refills: 0 | Status: SHIPPED | OUTPATIENT
Start: 2025-06-04

## 2025-06-04 RX ORDER — MUPIROCIN 20 MG/G
OINTMENT TOPICAL
Status: DISCONTINUED | OUTPATIENT
Start: 2025-06-04 | End: 2025-06-04 | Stop reason: HOSPADM

## 2025-06-04 RX ORDER — HYDROCODONE BITARTRATE AND ACETAMINOPHEN 5; 325 MG/1; MG/1
1 TABLET ORAL EVERY 4 HOURS PRN
Status: DISCONTINUED | OUTPATIENT
Start: 2025-06-04 | End: 2025-06-04 | Stop reason: HOSPADM

## 2025-06-04 RX ORDER — TRAMADOL HYDROCHLORIDE 50 MG/1
50 TABLET, FILM COATED ORAL EVERY 6 HOURS PRN
Qty: 21 TABLET | Refills: 0 | Status: SHIPPED | OUTPATIENT
Start: 2025-06-04

## 2025-06-04 RX ORDER — LIDOCAINE HYDROCHLORIDE 10 MG/ML
INJECTION, SOLUTION EPIDURAL; INFILTRATION; INTRACAUDAL; PERINEURAL
Status: DISCONTINUED | OUTPATIENT
Start: 2025-06-04 | End: 2025-06-04 | Stop reason: HOSPADM

## 2025-06-04 RX ORDER — LIDOCAINE HYDROCHLORIDE 20 MG/ML
INJECTION INTRAVENOUS
Status: DISCONTINUED | OUTPATIENT
Start: 2025-06-04 | End: 2025-06-04

## 2025-06-04 RX ORDER — FENTANYL CITRATE 50 UG/ML
INJECTION, SOLUTION INTRAMUSCULAR; INTRAVENOUS
Status: DISCONTINUED | OUTPATIENT
Start: 2025-06-04 | End: 2025-06-04

## 2025-06-04 RX ORDER — IBUPROFEN 600 MG/1
600 TABLET, FILM COATED ORAL EVERY 8 HOURS
Qty: 20 TABLET | Refills: 0 | Status: SHIPPED | OUTPATIENT
Start: 2025-06-04

## 2025-06-04 RX ORDER — CEFAZOLIN 2 G/1
2 INJECTION, POWDER, FOR SOLUTION INTRAMUSCULAR; INTRAVENOUS
Status: DISCONTINUED | OUTPATIENT
Start: 2025-06-04 | End: 2025-06-04 | Stop reason: HOSPADM

## 2025-06-04 RX ORDER — METOCLOPRAMIDE HYDROCHLORIDE 5 MG/ML
10 INJECTION INTRAMUSCULAR; INTRAVENOUS EVERY 10 MIN PRN
Status: DISCONTINUED | OUTPATIENT
Start: 2025-06-04 | End: 2025-06-04 | Stop reason: HOSPADM

## 2025-06-04 RX ORDER — ACETAMINOPHEN 500 MG
1000 TABLET ORAL
Status: COMPLETED | OUTPATIENT
Start: 2025-06-04 | End: 2025-06-04

## 2025-06-04 RX ORDER — PROPOFOL 10 MG/ML
VIAL (ML) INTRAVENOUS
Status: DISCONTINUED | OUTPATIENT
Start: 2025-06-04 | End: 2025-06-04

## 2025-06-04 RX ORDER — PROPOFOL 10 MG/ML
VIAL (ML) INTRAVENOUS CONTINUOUS PRN
Status: DISCONTINUED | OUTPATIENT
Start: 2025-06-04 | End: 2025-06-04

## 2025-06-04 RX ORDER — MIDAZOLAM HYDROCHLORIDE 1 MG/ML
INJECTION, SOLUTION INTRAMUSCULAR; INTRAVENOUS
Status: DISCONTINUED | OUTPATIENT
Start: 2025-06-04 | End: 2025-06-04

## 2025-06-04 RX ORDER — CEFAZOLIN SODIUM 1 G/3ML
INJECTION, POWDER, FOR SOLUTION INTRAMUSCULAR; INTRAVENOUS
Status: DISCONTINUED | OUTPATIENT
Start: 2025-06-04 | End: 2025-06-04

## 2025-06-04 RX ORDER — IBUPROFEN 200 MG
600 TABLET ORAL EVERY 6 HOURS PRN
Status: DISCONTINUED | OUTPATIENT
Start: 2025-06-04 | End: 2025-06-04 | Stop reason: HOSPADM

## 2025-06-04 RX ORDER — HYDROMORPHONE HYDROCHLORIDE 1 MG/ML
0.2 INJECTION, SOLUTION INTRAMUSCULAR; INTRAVENOUS; SUBCUTANEOUS EVERY 5 MIN PRN
Status: DISCONTINUED | OUTPATIENT
Start: 2025-06-04 | End: 2025-06-04 | Stop reason: HOSPADM

## 2025-06-04 RX ADMIN — FAMOTIDINE 20 MG: 10 INJECTION, SOLUTION INTRAVENOUS at 08:06

## 2025-06-04 RX ADMIN — FENTANYL CITRATE 25 MCG: 50 INJECTION, SOLUTION INTRAMUSCULAR; INTRAVENOUS at 08:06

## 2025-06-04 RX ADMIN — PROPOFOL 50 MG: 10 INJECTION, EMULSION INTRAVENOUS at 08:06

## 2025-06-04 RX ADMIN — ACETAMINOPHEN 1000 MG: 500 TABLET ORAL at 06:06

## 2025-06-04 RX ADMIN — ONDANSETRON 4 MG: 2 INJECTION INTRAMUSCULAR; INTRAVENOUS at 08:06

## 2025-06-04 RX ADMIN — CEFAZOLIN 2 G: 330 INJECTION, POWDER, FOR SOLUTION INTRAMUSCULAR; INTRAVENOUS at 08:06

## 2025-06-04 RX ADMIN — FENTANYL CITRATE 50 MCG: 50 INJECTION, SOLUTION INTRAMUSCULAR; INTRAVENOUS at 08:06

## 2025-06-04 RX ADMIN — MUPIROCIN: 20 OINTMENT TOPICAL at 06:06

## 2025-06-04 RX ADMIN — SODIUM CHLORIDE: 9 INJECTION, SOLUTION INTRAVENOUS at 08:06

## 2025-06-04 RX ADMIN — MIDAZOLAM HYDROCHLORIDE 2 MG: 2 INJECTION, SOLUTION INTRAMUSCULAR; INTRAVENOUS at 08:06

## 2025-06-04 RX ADMIN — LIDOCAINE HYDROCHLORIDE 50 MG: 20 INJECTION INTRAVENOUS at 08:06

## 2025-06-04 RX ADMIN — PROPOFOL 100 MCG/KG/MIN: 10 INJECTION, EMULSION INTRAVENOUS at 08:06

## 2025-06-04 RX ADMIN — CELECOXIB 200 MG: 200 CAPSULE ORAL at 06:06

## 2025-06-04 NOTE — PLAN OF CARE
Pre op procedure complete. All questions answered. Pt lying in bed, call bell in reach. Pt's belongings in bag at bedside will leave in locker, glasses with wife. Wife brought to bedside. Still needs site janey, H&P, & anes consent

## 2025-06-04 NOTE — H&P
[facility]  History & Physical  Orthopedics     SUBJECTIVE:       COVID-19 attestation:  This patient was treated during the COVID-19 pandemic.  This was discussed with the patient, they are aware of our current policies and procedures, were given the option of delaying their visit and or switching to a virtual visit, delaying their surgery when applicable, and they elect to proceed.     Chief Complaint: L wrist pain     Referring Provider: Maicol Cruz*      History of Present Illness:  Patient is a 69 y.o. left hand dominant male who presents today with complaints of left sided wrist pain. He's been seen in hand clinic before for this pain, and he last received an injection in the left wrist back in September of 2023, which he states gave him 3-4 months of relief. He is interested in surgical options for his wrist at this time.     Interval history March 17, 2025: The patient returns today for re-evaluation.  He notes that his left wrist arthritic pain persists.  He is status post a partial scaphoid excision and radial styloidectomy.  His biggest complaint has now become pain numbness and tingling to the left hand.  The numbness is essentially constant and involves the entire hand with sparing of the small finger.  He had a recent EMG.  He also has had 2 corticosteroid injections to the carpal tunnel with physiatry but these have not helped him at all.  He returns today for re-evaluation with no other complaints.     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     Symptoms are aggravated by activity and movement.     Symptoms are alleviated by rest.     Symptoms consist of pain and decreased ROM.     The patient rates their pain as a 5/10.     Attempted treatment(s) and/or interventions include activity modifications, rest, rest and steroid injection.     The patient denies any fevers, chills, N/V, D/C and presents for evaluation.              Past Medical History:   Diagnosis Date    Anemia      Anticoagulant long-term use      Chronic kidney disease      Chronic rhinitis      Disc       lower back    DJD (degenerative joint disease)      Foot pain       left foot    Gout, unspecified      HBP (high blood pressure)      High cholesterol      Kidney stone      Sleep apnea      Weight loss       15 pounds in two months            Past Surgical History:   Procedure Laterality Date    APPENDECTOMY        CARPECTOMY Left 4/19/2024     Procedure: CARPECTOMY - left radial styloidectomy and partial scaphoid excision;  Surgeon: Allen Silva MD;  Location: [facility] OR;  Service: Orthopedics;  Laterality: Left;    COLONOSCOPY N/A 11/11/2021     Procedure: COLONOSCOPY;  Surgeon: Christian Santiago MD;  Location: [facility] ENDO;  Service: Endoscopy;  Laterality: N/A;    ELBOW SURGERY        REMOVAL   4/19/2024     Procedure: REMOVAL, STYLOID BONE;  Surgeon: Allen Silva MD;  Location: [facility] OR;  Service: Orthopedics;;    VASECTOMY               Review of patient's allergies indicates:   Allergen Reactions    No known drug allergies        Social History          Social History Narrative    Not on file             Family History   Problem Relation Name Age of Onset    Cancer Mother        Heart disease Mother        Heart failure Mother        Liver disease Father             secondary to alcohol    Diabetes Maternal Grandmother        Allergies Son        Asthma Son        Urolithiasis Neg Hx        Prostate cancer Neg Hx        Kidney cancer Neg Hx        Eczema Neg Hx        Immunodeficiency Neg Hx        Angioedema Neg Hx        Allergic rhinitis Neg Hx        Atopy Neg Hx        Rhinitis Neg Hx        Urticaria Neg Hx               Current Medications      Current Outpatient Medications:     aspirin (ECOTRIN) 81 MG EC tablet, Take by mouth. 1 Tablet, Delayed Release (E.C.) Oral Every day, Disp: , Rfl:     atorvastatin (LIPITOR) 40 MG tablet, Take 1 tablet (40 mg  total) by mouth once daily., Disp: 90 tablet, Rfl: 3    azelastine (ASTELIN) 137 mcg (0.1 %) nasal spray, SPRAY 1 SPRAY IN EACH NOSTRIL 2 TIMES A DAY, Disp: 90 mL, Rfl: 3    ciclopirox (PENLAC) 8 % Soln, Apply topically nightly., Disp: 6.6 mL, Rfl: PRN    citalopram (CELEXA) 20 MG tablet, TAKE 1 TABLET BY MOUTH EVERY DAY, Disp: 90 tablet, Rfl: 3    fluticasone propionate (FLONASE) 50 mcg/actuation nasal spray, 1 SPRAY (50 MCG TOTAL) BY EACH NOSTRIL ROUTE ONCE DAILY., Disp: 48 mL, Rfl: PRN    gabapentin (NEURONTIN) 300 MG capsule, Take 1 capsule (300 mg total) by mouth every evening., Disp: 30 capsule, Rfl: 1    meloxicam (MOBIC) 15 MG tablet, Take 1 tablet (15 mg total) by mouth once daily., Disp: 30 tablet, Rfl: 0    mometasone 0.1% (ELOCON) 0.1 % cream, Apply topically 2 (two) times daily as needed., Disp: 15 g, Rfl: 3    multivitamin (THERAGRAN) per tablet, Take 1 tablet by mouth once daily., Disp: , Rfl:     potassium citrate (UROCIT-K 15) 15 mEq TbSR, TAKE 15 MEQ BY MOUTH 2 (TWO) TIMES A DAY. TAKE TWICE DAILY FOR STONE PREVENTION. WILL SEE IN STILL, Disp: 180 tablet, Rfl: 3    tamsulosin (FLOMAX) 0.4 mg Cap, Take 2 capsules (0.8 mg total) by mouth once daily. Take 1 nightly to help pass stone, Disp: 180 capsule, Rfl: 3    testosterone (ANDROGEL) 1.62 % (20.25 mg/1.25 gram) GlPk, Place 2.5 g onto the skin once daily., Disp: 60 packet, Rfl: 3    traMADoL (ULTRAM) 50 mg tablet, Take 1 tablet (50 mg total) by mouth every 6 (six) hours as needed for Pain., Disp: 10 tablet, Rfl: 0    traZODone (DESYREL) 50 MG tablet, Take 50 mg by mouth., Disp: , Rfl:   No current facility-administered medications for this visit.     Facility-Administered Medications Ordered in Other Visits:     fentaNYL 50 mcg/mL injection 100 mcg, 100 mcg, Intravenous, PRN, Hayley Edge PA-C, 100 mcg at 04/19/24 0959    midazolam injection 1 mg, 1 mg, Intravenous, PRN, Hayley Edge PA-C, 2 mg at 04/19/24 0959       "     Review of Systems:  As per HPI otherwise noncontributory     OBJECTIVE:       Vital Signs (Most Recent):  Vitals       Vitals:     03/17/25 0801   Weight: 78.5 kg (173 lb 1 oz)   Height: 5' 8" (1.727 m)         Body mass index is 26.31 kg/m².        Physical Exam:  Constitutional: The patient appears well-developed and well-nourished. No distress.   Skin: No lesions appreciated  Head: Normocephalic and atraumatic.   Nose: Nose normal.   Ears: No deformities seen  Eyes: Conjunctivae and EOM are normal.   Neck: No tracheal deviation present.   Cardiovascular: Normal rate and intact distal pulses.    Pulmonary/Chest: Effort normal. No respiratory distress.   Abdominal: There is no guarding.   Neurological: The patient is alert.   Psychiatric: The patient has a normal mood and affect.      Left Hand/Wrist Examination:     Observation/Inspection:  Swelling                       none                  Deformity                     none  Discoloration               none                  Scars                           left radial wrist, well-healed                  Atrophy                        none  TTP over the left dorsal wrist     HAND/WRIST EXAMINATION:  Finkelstein's Test                                Neg  WHAT Test                                         Neg  Snuff box tenderness                          Neg  Cervantes's Test                                     Neg  Hook of Hamate Tenderness              Neg  CMC grind                                           Neg  Circumduction test                              Neg     Neurovascular Exam:  Digits WWP, brisk CR < 3s throughout  NVI motor/LTS to M/R/U nerves, radial pulse 2+  Tinel's Test - Carpal Tunnel                positive  Tinel's Test - Cubital Tunnel               positive  Phalen's Test                                      Neg  Median Nerve Compression TestNeg     ROM hand full, painless     ROM wrist limited due to degenerative changes, decreased " flexion, extension, radial and ulnar deviation    ROM elbow full, painless     Abdomen not guarded  Respirations nonlabored  Perfusion intact     Diagnostic Results:     EMG -   Conclusion:      Abnormal study     EMG and nerve conduction study of the LEFT upper extremity revealed the following:      LEFT profoundly severe median mononeuropathy at the wrist (carpal tunnel syndrome)  Acute on chronic left C5, C6 cervical radiculopathy     There was no electrodiagnostic evidence of plexopathy, peripheral polyneuropathy or myopathy.     PLAN: Recommended bracing of the involved wrist at night and f/u with referring provider for additional management.     Imaging - I independently viewed the patient's imaging as well as the radiology report.  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     ASSESSMENT/PLAN:       69 y.o. yo male with SLAC wrist of the left wrist, left carpal tunnel syndrome     Plan: The patient and I had a thorough discussion today.  We discussed the working diagnosis as well as several other potential alternative diagnoses.  Treatment options were discussed, both conservative and surgical.  Conservative treatment options would include things such as activity modifications, workplace modifications, a period of rest, oral vs topical OTC and prescription anti-inflammatory medications, occupational therapy, splinting/bracing, immobilization, corticosteroid injections, and others.  Surgical options were discussed as well.      At this time, the patient has 3 main issues largely speaking.  He has persistent SLAC arthritis.  For that he would like a corticosteroid injection today.  His biggest issue at this time is his carpal tunnel syndrome which per his EMG is profoundly severe.  He would like to proceed with an open left carpal tunnel release on June 4, 2025 which I feel is reasonable.  I will get this set up.  He does have a positive Tinel's at the  left elbow but denies any involvement of the small finger or ulnar distribution and would like to proceed with a carpal tunnel release alone and defer intervention for cubital tunnel at this time.  Additionally, he is not ready for wrist salvage surgery for his advanced SLAC arthritis.  We will proceed as planned.  Referral to [facility] for his cervical radiculopathy as found on the EMG.  Follow-up for surgery.       The patient has not responded to adequate non operative treatment at this time and/or non operative treatment is not indicated. Thus, the risks, benefits and alternatives to surgery were discussed with the patient in detail.  Specific risks include but are not limited to bleeding, infection, vessel and/or nerve damage, pain, numbness, tingling, complex regional pain syndrome, compartment syndrome, failure to return to pre-injury and/or preoperative functional status, scar sensitivity, delayed healing, inability to return to work, pulley injury, tendon injury, bowstringing, partial and/or incomplete relief of symptoms, weakness, persistence of and/or worsening of symptoms, surgical failure, osteomyelitis, amputation, loss of function, stiffness, functional debility, dysfunction, decreased  strength, need for prolonged postoperative rehabilitation, need for further surgery, deep venous thrombosis, pulmonary embolism, arthritis and death.  The patient states an understanding and wishes to proceed with surgery.   All questions were answered.  No guarantees were implied or stated.  Written informed consent was obtained.     Should the patient's symptoms worsen, persist, or fail to improve they should return for reevaluation and I would be happy to see them back anytime.     Addendum: Patient declines spine referral today.  The referral will be canceled.       Allen Silva M.D.     Please be aware that this note has been generated with the assistance of rhonda voice-to-text.  Please excuse any  spelling or grammatical errors.     Thank you for choosing Dr. Allen Silva for your orthopedic hand and upper extremity care. It is our goal to provide you with exceptional care that will help keep you healthy, active, and get you back in the game.     If you felt that you received exemplary care today, please consider leaving feedback for Dr. Silva on [email or web address] at [email or web address].     Please do not hesitate to reach out to us via email, phone, or MyChart with any questions, concerns, or feedback.

## 2025-06-04 NOTE — PLAN OF CARE
Pt AAO and VSS/afebrile. Pt ready for discharge. Discharge instructions reviewed and pt/visitor verbalizes understanding. All questions answered. IV removed. IV catheter intact. Pt able to tolerate PO liquids. Pain control appropriate. Dressing is clean, dry, and intact.  Meds delivered to bedside. Pt ambulatory. AVS complete.

## 2025-06-04 NOTE — DISCHARGE SUMMARY
"[place] - [facility] (Hospital)  Discharge Note  Short Stay    Procedure(s) (LRB):  RELEASE, CARPAL TUNNEL - left open (Left)      OUTCOME: Patient tolerated treatment/procedure well without complication and is now ready for discharge.    DISPOSITION: Home or Self Care    FINAL DIAGNOSIS:  <principal problem not specified>    FOLLOWUP: In clinic    DISCHARGE INSTRUCTIONS:    Discharge Procedure Orders   Diet general     Other restrictions (specify):   Order Comments: You will be discharged on a "multi-modal" pain regimen. This means that different medications are used to control your pain. Each medication works differently to control your pain so that your pain control is optimized.    You have been prescribed Ultram (Tramadol)/OXycodone. You have been prescribed 20 pills. You may also take Motrin (Ibuprofen) and Tyelnol (Acetaminophen).    Norco and Percocet contain tylenol, do not take tylenol with these medications if you were prescribed them.     Do not bear weight on the operative extremity.    Leave your dressing on until your follow up appointment.     Call us if you experience: fever, chills, chest pain, shortness of breath, severe headache, pain not relieved by oral medications, increased pain and swelling at the operative site, or any other questions or concerns. We are happy to assist you at any time.     Leave dressing on - Keep it clean, dry, and intact until clinic visit     Call MD for:  temperature >100.4     Call MD for:  persistent nausea and vomiting     Call MD for:  severe uncontrolled pain     Call MD for:  difficulty breathing, headache or visual disturbances     Call MD for:  redness, tenderness, or signs of infection (pain, swelling, redness, odor or green/yellow discharge around incision site)     Call MD for:  hives     Call MD for:  persistent dizziness or light-headedness     Call MD for:  extreme fatigue        TIME SPENT ON DISCHARGE: 10 minutes  "

## 2025-06-04 NOTE — PLAN OF CARE
Pt's HR 47 on monitor. Vitals taken per flowsheet. Pt denies symptoms. Informed Dr. Lee, stated he will monitor.

## 2025-06-05 NOTE — OP NOTE
ORTHOPEDIC SURGERY OPERATIVE NOTE    SERVICE: ORTHOPEDICS     DATE OF PROCEDURE: 6/4/2025     SURGEON: Allen Silva M.D.    ASSISTANT: MD Medhat RES    PREOPERATIVE DIAGNOSIS: Left sided carpal tunnel syndrome    POSTOPERATIVE DIAGNOSIS: Left sided carpal tunnel syndrome    PROCEDURE PERFORMED: Left sided carpal tunnel release    ANESTHESIA: Local/MAC    ESTIMATED BLOOD LOSS: Minimal           SPECIMENS: None    COMPLICATIONS: None              CONDITION: Stable    IV FLUIDS: Crystalloid per anesthesia    IMPLANTS: None    TOURNIQUET TIME: 20 minutes at 250 mmHg    INDICATIONS FOR PROCEDURE: Ang Oden Jr. is a 69 y.o. male who presented to clinic with findings and workup consistent with carpal tunnel syndrome of the left hand.  The patient had not responded to nonoperative management and wished to proceed with surgical intervention.  The risks, benefits and alternatives to surgery were discussed with the patient at great length and in great detail.  Specific risks discussed include but are not limited to bleeding, infection, vessel damage, nerve damage, pain, numbness, tingling, weakness, stiffness, complex regional pain syndrome, hardware/surgical failure, need for further surgery, DVT, PE, arthritis, scar sensitivity, delayed healing, need for prolonged postoperative rehabilitation, and death amongst others.  The patient stated an understanding of the risks and wished to proceed with surgery.   All questions were answered.  No guarantees were implied or stated.  Informed consent was obtained.    PROCEDURE IN DETAIL: With the patient's participation in the preoperative holding area, the left upper extremity was marked as the surgical site. Preoperative checks were completed and consents were verified.  The patient was brought to the Operating Room and placed in supine position.  A well-padded nonsterile tourniquet was placed on the upper arm.  The left arm was then prepped and draped in the usual sterile  Pt comes into the ER with a few days of a productive cough and vomiting.       Lorenza Felix RN  01/17/18 0675 fashion.  Timeout was called for to verify the correct site, procedure and patient.  All were in agreement and we proceeded.  MAC anesthesia was introduced.  Under sterile conditions, an injection of 10cc 1% plain lidocaine was injected into the skin and subcutaneous tissues. The incision was marked out using Nicholas's cardinal line and the radial border of the ring finger. The arm was exsanguinated using an Esmarch and the tourniquet was inflated to 250mmHg. The incision was made in line with radial aspect of the 4th finger over the carpal tunnel for a distance of approximately 1.5cm. Careful dissection was made down to the palmar fascia. The ulnar fat pad was taken ulnarly and the palmar fascia was taken radially.  A mosquito hemostat was used to spread the superficial tissues off the transverse carpal ligament.  Retractors were placed proximally and distally to expose the ligament along its length.  The transverse carpal ligament was thus identified and exposed.  A scalpel was utilized to incise the ligament mid-substance.  A mosquito hemostat and freer elevator were then utilized to free the deep surface of the ligament from underlying structures.  Ligament division was then completed with tenotomy scissors both proximally and distally.  Care was taken distally to ensure not to cut past Kaplans Cardinal Line or injure the superficial palmar arch. The distal fat was visualized at the distalmost aspect of the ligament division.  Mosquito hemostat was passed proximally and distally to confirm that it was a complete release. The area was irrigated with copious amounts of normal saline and 4-0 nylon suture was used to close the skin with horizontal mattress sutures. Sterile dressing was applied. Tourniquet was deflated. Brisk capillary refill ensued. The patient was transferred to the recovery room in stable condition.     DISPOSITION: The patient will be discharged home with followup in approximately 2 weeks for  suture removal.  Early active range of motion in the acute postoperative period was discussed and encouraged.  They are to keep the dressing clean, dry, and intact until that time.

## 2025-06-10 ENCOUNTER — TELEPHONE (OUTPATIENT)
Dept: ENDOCRINOLOGY | Facility: CLINIC | Age: 70
End: 2025-06-10
Payer: MEDICARE

## 2025-06-10 NOTE — TELEPHONE ENCOUNTER
Pt advised Dr. Maher currently has a wait list for new patients that is greater than 6 months out. Can add pt to wait list but also recommend checking with the [facility] or outside of [facility] to establish care with an endocrinologist. Pt verb understanding and has been added to wait list.

## 2025-06-10 NOTE — TELEPHONE ENCOUNTER
Copied from CRM #[id number]. Topic: Appointments - Amb Referral  >> Morgan 10, 2025  1:54 PM Patricia wrote:  Type:  Sooner Appointment Request    Caller is requesting a sooner appointment.  Caller declined first available appointment listed below.  Caller will not accept being placed on the waitlist and is requesting a message be sent to doctor.    Name of Caller:  Ysabel Spouse  When is the first available appointment?  N/a  Symptoms:  low testosterone    Would the patient rather a call back or a response via [facility]? call  Best Call Back Number:  [phone]   Additional Information:  Thanks

## 2025-06-18 ENCOUNTER — OFFICE VISIT (OUTPATIENT)
Dept: ORTHOPEDICS | Facility: CLINIC | Age: 70
End: 2025-06-18
Payer: MEDICARE

## 2025-06-18 VITALS — HEIGHT: 67 IN | BODY MASS INDEX: 28.58 KG/M2 | WEIGHT: 182.13 LBS

## 2025-06-18 DIAGNOSIS — M25.532 LEFT WRIST PAIN: ICD-10-CM

## 2025-06-18 DIAGNOSIS — Z98.890 POSTOPERATIVE STATE: Primary | ICD-10-CM

## 2025-06-18 DIAGNOSIS — G56.02 CARPAL TUNNEL SYNDROME OF LEFT WRIST: ICD-10-CM

## 2025-06-18 PROCEDURE — 99214 OFFICE O/P EST MOD 30 MIN: CPT | Mod: PBBFAC

## 2025-06-18 PROCEDURE — 99999 PR PBB SHADOW E&M-EST. PATIENT-LVL IV: CPT | Mod: PBBFAC,,,

## 2025-06-18 NOTE — PROGRESS NOTES
Patient evaluated and plan of care established.  Please sign and return if in agreement.    Thank you,  SAMY Grace       Occupational Therapy [facility]  Initial Evaluation     Date: 6/24/2025  Name: Ang Oden Jr.  Clinic Number: [id number]    Therapy Diagnosis: G56.02 (ICD-10-CM) - Carpal tunnel syndrome of left wrist; Z98.890 (ICD-10-CM)-Postoperative state; M25.532 (ICD-10-CM) - Left wrist pain  Encounter Diagnosis   Name Primary?    Left wrist pain Yes     Physician: Carmen Field PA-C    Physician Orders: G56.02 (ICD-10-CM) - Carpal tunnel syndrome of left wrist; Z98.890 (ICD-10-CM)-Postoperative state; M25.532 (ICD-10-CM) - Left wrist pain; evaluate and treat; NONWEIGHTBEARING; A/PROM; scar massage  Surgical Procedure and Date: status post (Left) carpal tunnel release, 06/04/2025  post op: 2 weeks, 6 days  Dominant Side: Left  Date of Onset: 06/04/2025  History / Mechanism of Injury: Patient underwent (Left) carpal tunnel release on 06/04/2025.  Patient went back for first follow up on 06/18/2025, suture removed, steri strips placed.  Patient provided with (Left) short arm Titan wrist brace to wear for 1 week to prevent wound dehiscence. Patient was referred to therapy for evaluation and treatment.   Previous Therapy:  OP Occupational Therapy for (Left) hand in 2024    Environmental Concerns/Fall Risk: None  Language: None  Cultural/Spiritual: None  Abuse/Neglect/Nutritional: None  Imaging / Tests: See Epic    Past Medical History/Physical Systems Review:    has a past medical history of Anemia, Anticoagulant long-term use, Chronic kidney disease, Chronic rhinitis, Disc, DJD (degenerative joint disease), Foot pain, Gout, unspecified, HBP (high blood pressure), High cholesterol, Kidney stone, Sleep apnea, and Weight loss.     has a past surgical history that includes Appendectomy; Vasectomy; Elbow surgery; Colonoscopy (N/A, 11/11/2021); Carpectomy (Left, 4/19/2024); removal (4/19/2024); and Carpal  "tunnel release (Left, 2025).    has a current medication list which includes the following prescription(s): aspirin, atorvastatin, azelastine, ciclopirox, citalopram, fluticasone propionate, ibuprofen, mometasone 0.1%, multivitamin, ondansetron, potassium citrate, scopolamine, tamsulosin, testosterone, tramadol, and trazodone, and the following Facility-Administered Medications: fentanyl and midazolam.    Review of patient's allergies indicates:   Allergen Reactions    No known drug allergies         Insurance Authorization Period Expiration: 2025-2026  Plan of Care Certification Period: 2025-2025  Date of Return to MD: 2025    Occupation:    Working presently: retired  Workers Compensation:  no      Visit # / Visits authorized:   Time In: 1:00  Time Out: 1:20  Total Billable Time:  10 minutes        Precautions:  Patient. Reports, "no known Cancer, no pacemaker, no allergies to latex or adhesives."      Subjective     Patient Reports, "I had a carpal tunnel release on 2025.  I went back on 2025 for my first post op follow up.  They took the sutures out put steri strips on and gave me a splint to wear for a week.   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  I am still having numbness in my thumb.  I am having trouble buttoning, pulling on pants, turning doorknobs, pushing up with my hand."     Pain   At rest: 2/10  With work/ Activity: 5/10  Sleepin/10  Location of Pain: (Left) hand / wrist    Patient's Goals for Therapy: increase functional use of hand    Objective     Sensation: grossly intact; limited sensation on (Left) thumb  Scar / Wound: closed, cool to touch, pinkish in color  Edema:   centimeters (Right) / (Left)  Metacarpals:  22.0  / 22.4  Proximal Wrist Crease:  17.4  / 18.9                                       Active Range of Motion: hand  Patient able to actively make complete (Bilateral) fists      Thumb:                           (Right)  //   " (Left)  Opposition: 0.0  //  1.0   Comments: (Measured with wrist goni from thumb tip to base of 5th metacarpal) centimeters                                                    Passive Range of Motion: hand           Thumb:                           (Left)    Opposition: 0.0  Comments: (Measured with wrist goni from thumb tip to base of 5th metacarpal) centimeters                                   Active Range of Motion: wrist                         (Right)   //  (Left)  Dorsal Flexion /Volar Flexion: 55/60  //  25 / 32  Radial Deviation /Ulnar Deviation:  20/25  //  16 / 9  Supination / Pronation: 80/85  //  70 / 80     Passive Range of Motion: wrist        (submaximally, within pain tolerance)              (Left)   Dorsal Flexion /Volar Flexion:  30 / 36  Radial Deviation /Ulnar Deviation: 19 / 11  Supination / Pronation: 75 / WFL      Manual Muscle Test:  Wrist     (submaximally, within pain tolerance)    (Left)    Dorsal Flexion:  To be assessed   Volar Flexion:  To be assessed   Radial Deviation:  To be assessed   Ulnar Deviation: To be assessed        Strength: (NIKOLAS Dynamometer in pounds),Position II  (submaximally, within pain tolerance)   (Right):   To be assessed   (Left):  To be assessed     Pinch Strength: (Pinch Gauge in pounds)  (submaximally, within pain tolerance)              (Right) /  (Left)  Lateral Pinch:  To be assessed   3 Point Pinch:  To be assessed   2 Point Pinch:  To be assessed           FOTO SCORE: 59%      Treatment Included:   Occupational Therapy  evaluation and instruction in written Home Exercise Program including AROM for thumb opposition;  0# Dorsal Flexion, Volar Flexion, Radial Deviation, Ulnar Deviation x 10 repetitions x 3 x daily;  0# Supination / Pronation x 3 second holds x 10 repetitions x 3 x daily.  .    Patient. Educated on modalities for home pain management, activity modifications and precautions, Multidirectional scar massage for scar management  / Distal to  proximal edema massage for edema management .   Patient. Demo understanding of above.     Patient/Family Education: role of Occupational Therapy, goals for Occupational Therapy, scheduling/cancellations - patient verbalized understanding. Discussed insurance limitations with patient.        Assessment:     Problem List :       Decreased Range of motion,  Decreased functional abilities,  Increased pain,   Edema      During the evaluation, the patient required Minimal modification or assistance.  The following co-morbid conditions/personal factors may affect the plan of care: arthritis in wrist.        Ang Oden Jr. is a 69 y.o. male referred to outpatient occupational therapy and presents with a medical diagnosis of G56.02 (ICD-10-CM) - Carpal tunnel syndrome of left wrist; Z98.890 (ICD-10-CM)-Postoperative state; M25.532 (ICD-10-CM) - Left wrist pain, resulting in limited range of motion, strength, functional abilities, increased pain and inflammation and demonstrates limitations as described in the chart above. Following medical record review it is determined that patient will benefit from occupational therapy services in order to maximize pain free and/or functional use of left wrist. The following goals were discussed with the patient and patient is in agreement with them as to be addressed in the treatment plan. The patient's rehab potential is Good.     Anticipated Barriers to Therapy: None     The following goals were discussed with the patient and patient is in agreement with them as to be addressed in the treatment plan.     Goals:   Goals:  1)   Patient to be Independent with Home exercise program and modalities for pain management by 1 week.   2)   Patient will report   3/10 pain on average with activity to assist with exercises by 6-8 weeks.  3)   Patient will increase range of Motion by 3-5 degrees to increase functional use for activities of daily living by 6-8 weeks.  4)   Patient will decrease  edema by 0.1-0.3 millimeters  to increase joint mobility /flexibility by 6-8 weeks.          Plan:   Patient to be treated by Occupational Therapy    2    times per week for     90 days   during the certification period from   06/24/2025  to 09/24/2025     to achieve the established goals.  Treatment to include :  paraffin, fluidotherapy, manual therapy/joint mobilizations, kinesiotaping, dry needling performed by certified physical therapist,   modalities for pain management, Ultrasound 1.0 /3.0mhz, therapeutic exercises/activities,        strengthening,    as well as any other treatments deemed necessary based on the patient's needs or progress.     Will reassess as needed and adjust treatment plan accordingly.              I certify the need for these services furnished under this plan of treatment and while under my care    ____________________________________                         __________________  Physician/Referring Practitioner                                               Date of Signature    Maribel Wolf OT

## 2025-06-18 NOTE — PROGRESS NOTES
Dr. Silva is the supervising physician for this encounter/patient    Ang Oden Jr. presents for post-operative evaluation.  The patient is now 2 weeks s/p left open carpal tunnel release with Dr. Silva on 6/4/25.  Overall the patient reports doing well, and he reports a 4/10 pain.  He does note his left thumb is still numb as well as his left index finger.  He reports his left middle finger sensation is improving since surgery.  The patient is taking tramadol and ibuprofen for post operative pain control. Patient admits to improving range of motion of the left hand, but he does note occasional grinding sensations and popping sensations of the left wrist.  He denies fevers, chills, night sweats, drainage, erythema, and warmth from the wound.  He presents today for his initial postoperative evaluation with no further complaints.    PE:  AA&O x 4.  NAD  HEENT:  NCAT, sclera nonicteric  Lungs:  Respirations are equal and unlabored.  CV:  2+ bilateral upper and lower extremity pulses.  MSK: The wound is healing well with no signs of erythema or warmth.  There is no drainage.  No clinical signs or symptoms of infection are present.  Sutures discontinued today.  Steri-Strips applied.  The patient is able to make a full composite fist with the left hand.  Wrist range-of-motion is mildly restricted due to known radiocarpal joint arthritic change.  He is neurovascularly intact to left upper extremity with the exception of decreased sensation to light touch of the left thumb and index finger.    A/P: Status post above, doing well  1) Continue with non weight bearing.  We will transition him to a left short-arm Titan wrist brace.  This brace is to be worn for one-week to prevent wound dehiscence.  2) Continue active and passive range of motion exercises.  Occupational therapy orders placed today for gentle range of motion of the left hand.  3) All sutures removed today. Wound care and signs of infection discussed.   Begin gentle scar massage with Mederma, vitamin-E oil, cocoa butter  4) F/U 4 weeks for range-of-motion re-evaluation of the left hand or sooner for any problems.  5) Call with any questions/concerns in the interim    Disclaimer:  This note is prepared using voice recognition software and as such is likely to have errors and has not been proof read. Please contact me for questions.     Carmen Field PA-C  Orthopedic/Hand Surgery

## 2025-06-24 ENCOUNTER — CLINICAL SUPPORT (OUTPATIENT)
Dept: REHABILITATION | Facility: HOSPITAL | Age: 70
End: 2025-06-24
Payer: MEDICARE

## 2025-06-24 DIAGNOSIS — M25.532 LEFT WRIST PAIN: Primary | ICD-10-CM

## 2025-06-24 DIAGNOSIS — Z98.890 POSTOPERATIVE STATE: ICD-10-CM

## 2025-06-24 DIAGNOSIS — G56.02 CARPAL TUNNEL SYNDROME OF LEFT WRIST: ICD-10-CM

## 2025-06-24 PROCEDURE — 97530 THERAPEUTIC ACTIVITIES: CPT | Mod: PN

## 2025-06-24 PROCEDURE — 97022 WHIRLPOOL THERAPY: CPT | Mod: PN

## 2025-06-24 NOTE — PROGRESS NOTES
"                                  Occupational Therapy Daily Treatment Note       Date: 7/8/2025  Name: Ang Oden Jr.  Clinic Number: [id number]    Therapy Diagnosis: G56.02(ICD-10-CM)-Carpal tunnel syndrome of left wrist; Z98.890(ICD-10-CM)-Postoperative state; M25.532(ICD-10-CM)-Left wrist pain  Encounter Diagnosis   Name Primary?    Left wrist pain Yes     Physician: Carmen Field PA-C    Physician Orders: G56.02(ICD-10-CM)-Carpal tunnel syndrome of left wrist; Z98.890(ICD-10-CM)-Postoperative state; M25.532(ICD-10-CM)-Left wrist pain; evaluate and treat; NONWEIGHTBEARING; A/PROM; scar massage   Medical Diagnosis: G56.02(ICD-10-CM)-Carpal tunnel syndrome of left wrist; Z98.890(ICD-10-CM)-Postoperative state; M25.532(ICD-10-CM)-Left wrist pain  Surgical Procedure and Date: status post (Left) carpal tunnel release, 06/04/2025    Evaluation Date: 06/24/2025  Insurance Authorization Period Expiration: 06/23/2025-12/31/2025  Plan of Care Certification Period: 06/24/2025-09/24/2025  Date of Return to MD: 07/16/2025    Evaluation FOTO: 06/24/2025 = 59%    Visit # / Visits authorized: 1 / 10  Time In: 8:55  Time Out: 9:40   Total Billable Time:  40 minutes    Precautions:  Standard;  NONWEIGHTBEARING; A/PROM; scar massage       Post Op:  06/04/2025 = 4 weeks, 6 days      Subjective     Patient reports: "I have been doing the exercises at home and they are doing well.  I have been doing 30 repetitions instead of 10."     Pain: 3-4/10   Location of pain: (Left) wrist     Objective    Patient seen by Occupational Therapy this session. Tx consisted of:      Supervised modalities after being cleared for contradictions  x    10 minutes:     -Fluidotherapy to  (Left)   hand to increase blood flow, circulation, tissue elasticity, desensitization, sensory re-education and for pain management  x 10 minutes.        Therapeutic Exercises to improve functional performance while increasing strength, endurance, range of " motion,  and flexibility  x    10 minutes:    -Scar Massage to  volar aspect of (Left) wrist region around incision site  with  mini therapeutic vibrator // mini dowel massager  to decrease dense scar adhesions and improve tensile glide  x 1 minutes.     -Manual Therapy techniques to (Left) thumb and wrist  including joint mobilizations, stretching, and Passive Range of Motion to increase joint mobility, range of motion  and for pain management  x  4 minutes     -Soft Tissue Mobilization to (Left) hand into wrist and forearm  from distal to proximal to decrease edema and increase range of motion and functional abilities  x  1  minutes    - Active Assistive Range of Motion for (Left) wrist in all planes x 10 repetitions  -  0# Dorsiflexion/Volar flexion (Supination /Pronation ), Radial Deviation / Ulnar Deviation x 10 repetitions  -  0# dowel for Supination /Pronation  x 10 repetitions       Therapeutic Activities  to improve functional performance while increasing independence with ADLs & IADLs  x    20 minutes:    -Ultrasound applied to volar aspect of (Left) wrist around scar tissue  on   3.0 Mhz with 0.7 w/cm2 @ 100 % duty cycle to decrease pain and inflammation  /////  to remold scar tissue and increase tissue elasticity x 8 minutes.      - Colored bead UNlacing with thumb to base of 5th digit x 10 beads  - Mini colored pegboard for Fine Motor Coordination and in-hand manipulation  (5 in-hand) x 20 pegs   - Wrist roller coaster for Active Range of Motion  of wrist in all planes x 10 repetitions   - Weighted ball on tabletop for Dorsiflexion /Volar Flexion  stretches x 10 repetitions  - YELLOW (non weighted) ball [place] for Supination/Pronation stretch (elbow Extension ) x 10 reps   - Wrist wheel for Supination / Pronation stretch x 10 repetitions   - Wrist wheel for Radial Deviation / Ulnar Deviation stretches x 10 repetitions     -NP = Not Performed     Initiate in future therapy sessions:    - RED Theraputty for  "rolling x 10 repetitions (NEXT)   - ### digiflex for isolated x 10 repetitions;  ### digiflex for composite digital flexion x 20 repetitions  (NEXT)      - ### Hand X-Trainer for thumb Abduction x 5 repetitions   -  ###Progressive Hand Gripper (black spring) for composite  x 20 repetitions   - ### Theraputty for (Bilateral) pulling, PVC for "cookie cutting" and medicine top x 10  Repetitions      - Wooden pegboard with ### clothespins with 3PT pinch x 2 repetitions   - YELLOW digiweb for composite digital Extension and  intrinsics x 20 repetitions     - Velcro board with LARGE dowel for Dorsal Flexion / Volar Flexion x 10 repetitions      - Velcro checkerboard (on wedge) with "dart throwing" pattern x 1 repetition    - Low load prolonged stretches for Dorsiflexion /Volar Flexion with ### leg weight  (Table edge) x 30 seconds each x 2 repetitions      - ### Flexbar for Supination /Pronation  and Dorsiflexion /Volar Flexion x 10 repetitions    - ### Wrist exerstick in standing for Dorsiflexion / Volar Flexion  x 3 repetitions          Assessment     Patient will continue to benefit from skilled Occupational Therapy intervention to increase functional abilities, range of motion, and strength and pain control.  Patient. demonstrated proper understanding of each exercise.  Patient continues to require verbal and tactile cues for throughout therapy session to maintain position and prevent compensation.   Patient continues to be limited in functional and leisurely pursuits. Pain limits patient's participation in activities of daily living.  Patient is not able to carryout necessary vocational tasks.   Patient's spiritual, cultural and educational needs considered and patient agreeable to plan of care and goals.  Patient is making good progress towards established goals.  Patient tolerated exercises / activities within pain tolerance.   Reviewed Home Exercise Program with patient., see EPIC under "patient instructions" " for provided exercises, activity modifications and limitations, modalities for home pain management.  Patient. demo understanding of above.    Patient. tolerated Fluidotherapy & US with no irritation.      Active Range of Motion: wrist        (pre session)                  (Right)   //  (Left)  Dorsal Flexion /Volar Flexion: 55/60  //  32 (+7)  / 39 (+7)   Radial Deviation /Ulnar Deviation:  20/25  //  18 (+2)  / 10 (+1)   Supination / Pronation: 80/85  //  75 (+5)  / 82 (+2)     Patient demo increased AROM for (Left) wrist Dorsal Flexion, Volar Flexion, Radial Deviation, Ulnar Deviation, Supination, and Pronation.     New/Revised Goals: Continue Plan Of Care   Goals:  1)   Patient to be Independent with Home exercise program and modalities for pain management by 1 week. (MET)   2)   Patient will report   3/10 pain on average with activity to assist with exercises by 6-8 weeks. (In Progress)   3)   Patient will increase range of Motion by 3-5 degrees to increase functional use for activities of daily living by 6-8 weeks. (In Progress)   4)   Patient will decrease edema by 0.1-0.3 millimeters  to increase joint mobility /flexibility by 6-8 weeks.  (In Progress)     Plan      Continue 2x week during the 90 day certification period   06/24/2025   to 09/24/2025   with established Plan Of Care in pursuit of Occupational Therapy goals.      Maribel Wolf, OT

## 2025-07-03 ENCOUNTER — HOSPITAL ENCOUNTER (OUTPATIENT)
Dept: RADIOLOGY | Facility: HOSPITAL | Age: 70
Discharge: HOME | End: 2025-07-03
Attending: UROLOGY
Payer: MEDICARE

## 2025-07-03 DIAGNOSIS — N20.0 KIDNEY STONES: ICD-10-CM

## 2025-07-03 PROCEDURE — 74018 RADEX ABDOMEN 1 VIEW: CPT | Mod: TC

## 2025-07-03 PROCEDURE — 74018 RADEX ABDOMEN 1 VIEW: CPT | Mod: 26,,, | Performed by: RADIOLOGY

## 2025-07-03 PROCEDURE — 76770 US EXAM ABDO BACK WALL COMP: CPT | Mod: 26,,, | Performed by: RADIOLOGY

## 2025-07-03 PROCEDURE — 76770 US EXAM ABDO BACK WALL COMP: CPT | Mod: TC

## 2025-07-07 ENCOUNTER — OFFICE VISIT (OUTPATIENT)
Dept: UROLOGY | Facility: CLINIC | Age: 70
End: 2025-07-07
Payer: MEDICARE

## 2025-07-07 ENCOUNTER — PATIENT MESSAGE (OUTPATIENT)
Dept: UROLOGY | Facility: CLINIC | Age: 70
End: 2025-07-07
Payer: MEDICARE

## 2025-07-07 DIAGNOSIS — Z12.5 SPECIAL SCREENING FOR MALIGNANT NEOPLASM OF PROSTATE: ICD-10-CM

## 2025-07-07 DIAGNOSIS — E29.1 HYPOGONADISM IN MALE: Primary | ICD-10-CM

## 2025-07-07 DIAGNOSIS — N20.0 KIDNEY STONE: ICD-10-CM

## 2025-07-07 PROCEDURE — 98014 SYNCH AUDIO-ONLY EST MOD 30: CPT | Mod: NDTC,,, | Performed by: UROLOGY

## 2025-07-07 NOTE — PROGRESS NOTES
The patient location is: HOME  The state in which patient is residing at time of visit: [place]  Visit type: Virtual visit with AUDIO ONLY (TELEPHONE)  Total time spent in medical discussion: 15 minutes  Total time spent on date of the encounter: 30 minutes    The chief complaint leading to consultation is:   1. Hypogonadism in male  Ambulatory referral/consult to Endocrinology    PSA, Screening      2. Kidney stone        3. Special screening for malignant neoplasm of prostate  PSA, Screening          Date of Service: 07/07/2025  Carmen Alford MD    Each patient to whom he or she provides medical services by telemedicine is:    (1) informed of the relationship between the physician and patient and the respective role of any other health care provider with respect to management of the patient; and   (2) notified that he or she may decline to receive medical services by telemedicine and may withdraw from such care at any time.  (3) Patient verbally consented to treatment via phone, according to the clinic consent to treat policies outlined by the registrar    This service was not originating from a related E/M service provided within the previous 7 days nor will  to an E/M service or procedure within the next 24 hours or my soonest available appointment.  Prevailing standard of care was able to be met in this audio-only visit.      [facility], formerly known as [facility]  Group MD's:Rocío/Mary Kate/Salbador/Arden/Yessi  Group NP's: Karey Lopez    PCP: Jordana Anderson MD  Date of Service: 07/07/2025  Today's note written by: MD Rocío    CHIEF COMPLAINT:    Mr. Oden is a 69 y.o. male presenting for kidney stones  PRESENTING ILLNESS:    Ang Oden Jr. is a 69 y.o. male with a PMH of BPH, kidney stones, HTN, Gout who presents for kidney stones. Last clinic visit was 1/19/21 with Dr. Hartmann.  Ctrss 2/17/19- multiple B renal  stones  Ctrss 1/7/20- had multiple R renal stones. A few left renal stones.   Ctrss 11/20/20 -only 1 right renal stone. A few left renal stones.   Patient was seen in ED 7/7/22 for right flank pain. Patient reported that he passed a small stone that morning. Discharged home with norco, zofran, ketorolac, and flomax. UA negative  CT RSS 7/7/22: The kidneys are of normal size contour and CT density for a noncontrast study.  On the right or 10 intrarenal calcifications consistent with stones.  The largest measures 6 mm.  Hydronephrosis or ureteral stone is not seen.  On the left are 12 intrarenal calcifications.  The largest measures 4 mm.  Hydronephrosis or ureteral stone is not seen on this side.  The bladder is of normal contour without mass or asymmetry  7/14/22 visit with Karey patient presents to clinic for f/u kidney stones. Patient reports he has passed a few stones since ED visit. Only symptom is flank pain. Denies dysuria, gross hematuria, fever, chills, nausea or vomiting. Denies difficulty voiding. He takes flomax when he is passing stone and needs refill. He also request refill for pain medication. His previous urologist would give him pain medication and flomax to keep on hand if needed for stones. He was not able to collect any stones that he recently passed.  Patient reports he does pass stones very often and has not ever needed surgery to remove stones.   Drinks: does not drink as much water as he should  3/14/19 Stone analysis resulted 100% calcium oxalate stone  He does have hx of Gout but no uric acid stone that patient is aware of. No recent Gout attacks since he stopped drinking alcohol.    Interval history 8/16/22:  He was c/o pain and so she sent him for ct. Ct essentially unchanged. No ureteral stones, no hydro. However continues to pass small fragments. Does ride a motorcycle about 4x a week. Stones 8/2: 100% calcium oxalate. Ca on last bmp 7/7/22 9.2  He has had stones for 10 to 15 years or  more. No family hx. Review of ct in 2020 shows fewer stones. Doesn't appear to have done a 24 hour urine in the charts but thinks he's done before, parathyroid hormone or has never been a medication for kidney stones. He's passed stones as big as a qtip. Meds: no topamax, vit D or calcium.   Only takes flomax as needed although ct shows stones in prostate and intravesical portion of prostate. Otherwise no problems with urinating.   ua today is negative    ctrss 7/21/22:          Interval history 10/18/22:  Here to discuss 24 hour urine done 8/25/22. Shows low UOP: 1.03L and low citrate 284. Pth 46.8. his citrate in 2019 was normal at 689 and his ct then in 2019 was the same as ct now but ct in 2020 actually showed less stones in R kidney. He had low uop then as well. Says he doesn't like drinking too much fluids, causes him to bloat.           He said he's passed stones about 3x a week since I last saw him. He says he only likes to take flomax when he needs to- if he's passing a stone, but he's been taking every day for the last 2 months bc he's been passing stones.  He says he notices a stronger flow but wants to stop the medication if he can bc he doesn't like to take meds.   Largest stone he has passed is  about 6mm  He said he's still having R flank pain that is constant but worse when he's about to pass another stone. The right flank pain has never gone away since his ct in august. No images since august 2022.  Gave him toradol last visit. He says didn't help with pain.     Interval history by ME in CLINIC on 10/5/23 for kidney stones:  Had him do a PSA and it was 0.68 on 09/01/2023.  Same exact as last year on 07/21/2022.  Denies any difficulty urinating except for when he is trying to pass stones.    He says he passed a stone last week, about 2 mm.  A few months ago he passed stones daily for a month.  Right flank pain radiating to his right groin.  He is about to run out of Flomax.  His urine today shows  trace blood and trace leukocyte.  No fevers or chills.    Review of his CT from last year showed that he would 10+ stones on each side, the largest measuring about 5 mm in the right.  His recent KUB (Xray of the abdomen to look for stones) on 09/27/2023 showed bilateral small stones.  None appearing in the ureter.  Did not get an ultrasound yet.    Has not really been purposely trying to increase citrate intake.  We discussed again today    Ctrss 10/18/22        Kub 9/27/23      Interval history by ME/ in CLINIC on 10/24/24:  A CT on October 21st showed slight left-sided hydronephrosis secondary to left proximal 4mm stone, 13 stones on the left (largest 4mm), and 8 stones on the right (largest 8mm).He has a current experiencing pain rated at 6.5/10 in the left side near the bladder. He reports increased urination frequency but denies dysuria, fevers, chills, hematuria, or cloudy urine. He has a history of multiple kidney stones bilaterally, having passed 5-7 small stones in the past year with associated pain. Urine today with small leuk and tr bld. No uti sx. No fevers or chills.   MEDICATIONS:He started Flomax as prescribed for stone passage (not bph) and takes potassium citrate daily, though he was instructed to take it twice daily but often forgets the second dose.  DIETARY HABITS:He reports regular soda consumption, acknowledging it may contribute to dehydration and exacerbate his condition.  MEDICAL HISTORY:He is borderline anemic, currently being evaluated by his primary care physician. He has a history of C. diff infection in 2011. He denies diabetes and is not on any blood thinners.  LOW TESTOSTERONE:He has a history of low testosterone. Two tests performed after 8:00 a.m. showed low levels. A third test before 8:00 a.m. resulted in a level of 372, which is above the diagnostic threshold of 300. He is aware that testosterone testing should be performed before 8:00 a.m. for accurate  results.    Only pertinent history related to their urologic issues were discussed above.   Rbus 10/21/24: read says not hydro but independent review shows possible hydro      Ctrss 10/21/24: b stones. 13 left, 8 right. Porximal 5mm left stone with mild hydro. Confirmed with ind review    Interval history by ME/ - CLINIC virtual visit (AUDIO ONLY) on 10/31/24:  Passed L ureteral stone, still has multiple b stones  Testosterone before 9am 275 on 10/25/24 and 264 on 10/28/24  Never has been on testosterone      The pt is also c/o of symptoms of low T and would like further evaluation.   The patient has not been any evaluated in the past.  The patient has not been any on exogenous testosterone in the past.  The patient does have good libido   The patient does not have erectile dysfunction  The patient does have depression occasionally. On medicines for fatigue. Started medicines years ago. Comes on and off them.   The patient does have fatigue. Comes and goes. Wants to sleep anytime he sits.   The patient does have concentration issues. Sometimes.   The patient does not know have difficulty gaining or maintaining muscle mass    The patient gets 5 sleep a night. Has sleep apnea. Doesn't use machine. He says lost machine. Last used it years ago.   The patient does not workout regularly  The patient does not have a family history of prostate cancer    Children (last child, fathered?, plans to have any more): 3, had a vasectomy    Psa 0.55  9/6/24  H/h 12.8/38.3    Interval history 1/29/25 (Tyree TRUJILLO)  Pt presents for f/u kidney stones and low T     1/26/25  T 234  PSA 0.62  H&H 14.2 / 41.7    Pt is using androgel 1.25 g (1 packet) on skin daily. No improvement to symptoms of low T  Would like to increase to 2 packets per day  Awaiting appt for sleep study     24 hour urine 11/15/24:  Taking urocit K 15 mEq daily. Cannot tolerate twice a day  10/21/24 K 4.0 / creatinine 0.9 / GFR >60        Interval history by  ME/ in CLINIC (In-person) on 6/3/25:  Mr. Oden reports passing multiple kidney stones yesterday, including about 8 smaller pieces in the morning, followed by 2 large stone fragments which then broke up into smaller fragments. He had severe pain and discomfort during this process. Mr. Oden had been taking Flomax up until last Wednesday but stopped due to upcoming surgery tomorrow. He has a long-standing history of kidney stones.  Recent imaging studies, including X-rays, ultrasound, and CT, showed bilateral kidney stones and possible right ureteral stones. The CT revealed 2 distal right ureteral stones, measuring 10 mm and 8 mm. After passing stones yesterday, he did not notice any blood in his urine today.  Mr. Oden discusses ongoing issues with low testosterone. He has been using Androgel, which was increased from 1 packet to 2 packets daily. His testosterone levels remain low, with recent tests showing levels in the 230s. He reports not noticing any difference with the testosterone treatment. Mr. Oden mentions fatigue, which he attributes partly to anemia, noting that his red blood count is low but has improved slightly.  He denies problems with libido or erectile dysfunction.        My notes:  H/o stones- he had a fu rbus 5/28/25 which showed no hydro + b stones and a kub which showed b stones and possible distal R ureteral stone. Sent him for a ctrss done 5/30/25 which showed 2 distal R ureteral stones (8mm and 10mm) as well as several b renal stones. Ua marlys day showed tr leuk and 1 rbc/3 wbc, cr was 1.1. he was taking flomax up until last wed bc has surgery soon. yesterday he passed 2 large stone fragments which then broke up into smaller fragments. Never really had severe pain. Very comfortable. No blood in urine. Taking K citrate once a day.           IInterval history by ME/ - CLINIC virtual visit (AUDIO ONLY) on 7/7/25:  History of Present Illness    CHIEF COMPLAINT:    Cecille presents for a follow-up telehealth visit to discuss the status of recently passed kidney stones and to review results of a renal ultrasound.    HPI:  Mr. Oden has recently passed several large kidney stones, including one exceptionally large stone and a cluster of stones that fragmented after passing. Mr. Oden has a history of kidney stones, with the most recent stones being calcium oxalate composition based on a 2022 analysis. Mr. Oden has multiple bilateral stones and a recent right ureteral stone. Mr. Oden only takes Flomax when actively passing stones and is currently taking potassium citrate daily for stone prevention.    Mr. Oden has a history of low testosterone, fatigue, and anemia, which were discussed in a visit about a month ago. Mr. Oden is on testosterone therapy for these conditions.    Mr. Oden has calcifications near the bladder that have been present for a long time, which were previously discussed and thought to be vascular calcifications rather than stones.    Mr. Oden reports having had an x-ray and ultrasound recently, which showed a stone on the left side measuring about 11 mm. This conflicts with a CT from May 30th that showed smaller stones.    Mr. Oden has been referred to endocrinology for hypogonadism but has not yet been seen due to long wait times for appointments.    PERTINENT MEDICATIONS:  Potassium citrate, daily, for kidney stones  Flomax, as needed when passing kidney stones  Testosterone therapy, for fatigue and anemia    PERTINENT MEDICAL HISTORY:  Low testosterone  Anemia  Kidney stones  Hypogonadism    PERTINENT TEST RESULTS:  Urinalysis: May 30, 2023 (approximately), no blood detected  PSA: May 28, 2023  Stone analysis: 2022, calcium oxalate stones identified CT Abdomen: May 30, 2023, showed 7-8 mm kidney stone on left side, multiple bilateral stones noted  Renal and Bladder Ultrasound: Recent, results inconclusive, difficult to interpret  X-ray:  Recent, showed an 11 mm stone on the left side         My notes:    Hypgonadism with continued fatigue and anemia  CONTINUE t packets 2 in the morning  Recommend he see endocrinology for T management which is he wanting to use primarily for fatigue and anemia. NO ED.   Pt says looking for endocrinology outside [facility]. Limited on options with [facility] due to shortage.   At minimum cbc, psa and T in 6months (would do mid-day T).   Distal R ureteral stone and Bilateral stones  Restart flomax once he can   Confirm stones passed. Rbus and kub in a month and fu for telephone visit to review.   Monitor remaining stones with rbus and kub every 6 months. Concern for silent obstruction since he doesn't really have a lot of pain with stones.   Tends to pass stones   Continue K citrate once a day      Rbus and kub and lab ua reflex (to check for blood) in a month and fu after for telpeohne visit    Fu in 6 months with urology np with cbc, psa and T (mid day) and rbus and kub       Low T - he said he is using 2 packets a day. Still has fatigue and anemia. No ED.      Urine history: family history of kidney, bladder or prostate cancer:No, personal or family history of kidney stones: Yes - personal, no family ,tobacco use: Yes - quit 30 yrs ago -smoked for 10 years, anticoagulation: No   7/7/25  neg  5/30/25 Tr leuk  10/24/24 Small wbc   10/24/24 Ng, Small leuk/tr ket- 8 rbc  10/21/24 3+bld, >100 rbc  10/5/23 Tr leuk/tr bld  10/18/22 Neg   8/16/22 Neg  7/14/22 Neg  7/7/22  Neg  7/14/20 Neg  1/7/20  Ng, void: tr bld  2/17/19 2+bld  6/28/16 2+bld  11/4/15 Neg  9/25/15 3+bld  11/16/11 Neg     PSA History: no fam hx of prostate cancer\  5/28/25 0.75  1/26/25 0.62  9/6/24  0.55  9/1/23  0.68  Component PSA, Screen PSA Diagnostic   Latest Ref Rng & Units 0.00 - 4.00 ng/mL 0.00 - 4.00 ng/mL   7/21/2022 0.68    1/19/2021  0.83   1/7/2020  0.85   12/7/2018 0.46    3/8/2017 0.40    11/4/2015 0.43    1/30/2014 0.38    11/3/2012 0.56     1/7/2012 0.52    12/28/2010 0.36      REVIEW OF SYSTEMS:  As above    PHYSICAL EXAMINATION:  There were no vitals filed for this visit.     exam as above     Recent Labs   Lab 09/06/24  1710 01/26/25  1132 05/28/25  0834   WBC 7.98 6.58 7.18   Hgb  --   --  12.0 L   Hemoglobin 12.8 L 14.2  --    Hematocrit 38.3 L 41.7  --    Hct  --   --  36.3 L   Platelet Count  --   --  231   Platelets 207 204  --    ]  Recent Labs   Lab 09/01/23  0613 03/25/24  1554 09/06/24  1710 10/21/24  1434 03/19/25  0733 05/30/25  1019   Sodium 145  --  140 141 144 142   Potassium 3.9  --  4.0 4.0 4.0 3.5   Chloride 110  --  106 107 109 108   CO2 23  --  23 23 23 25   BUN 13  --  14 10 15 13   Creatinine 1.1  --  0.9 0.9 0.9 1.1   Glucose 90  --  97 94 80 130 H   Calcium 9.3  --  9.4 9.2 9.4 9.0   Alkaline Phosphatase 57  --  53 L  --  73  --    Total Protein 7.1  --  7.3  --  7.2  --    Albumin 3.9 4.0 3.7  --  3.6  --    Total Bilirubin 1.0  --  1.2 H  --  0.4  --    AST 25  --  26  --  26  --    ALT 19  --  22  --  29  --    ]      IMPRESSION:    Ang GARAY Cecille Eli is a 69 y.o. male    Encounter Diagnoses   Name Primary?    Hypogonadism in male Yes    Kidney stone     Special screening for malignant neoplasm of prostate          Plan:  's typed/written- Abbreviated/Short Plan:  Nephrolithiasis. He had a R ureteral stone and multiple b stones. Tends to pass stones. Recent rbus said he has a 11mm stone in L kd. Explained rbus overestimates sizes. Ct more accurate. Just had ct in may and it showed multiple stones, all less than 6mm.   Rbus and kub in 6-12 months and fu after   Continue K cit once daily   Referring to endocrinology for his low T- only on it for fatigue and anemia. Really should be seeing primary care or endocrinology for this. Referral placed. I can check his labs and see him until he can see them.   Cbc, psa and T in November and fu after in December unless he sees endocrine first.   Continue T 2 packets  once daily       Message sent to our insurance:   Pt states that his psa wasn't covered on the day his lab was drawn - may 28th   Needs to be medically necessary  Medicare won't cover more than 1 psa a year  However I'm monitoring his psa bc I have him on T therapy  Do you know what I need to change and how?   Put a different order, different diagnosis?        The following assessment plan was created by [email or web address] via ambient listening:  Assessment & Plan    E29.1 Hypogonadism in male  N20.0 Kidney stone    IMPRESSION:   Reviewed recent imaging studies to assess status of kidney stones.   Evaluated possibility of remaining stones in right ureter based on previous imaging findings.   Assessed PSA monitoring necessity in context of testosterone therapy.   Determined need for endocrinology referral for management of hypogonadism and anemia.   Evaluated current stone prevention regimen, including use of potassium citrate.    Please review the short plan as above for concise and accurate plan. The dictated/AI generated plan may have some inaccuracies .    PLAN SUMMARY:   Order CBC, PSA, and testosterone tests for November   Continue potassium citrate daily   Referral to endocrinology for hypogonadism management   Order renal and bladder ultrasound and X-ray for December   Contact office regarding insurance coverage for May 28th PSA test   Follow-up in December; bring kidney stones for analysis    HYPOGONADISM IN MALE:   Referred to endocrinology for hypogonadism management.   Ordered CBC, PSA, and testosterone tests for November.   Contact the office regarding insurance coverage issue for PSA test from May 28th.    KIDNEY STONE:   Explained difference between CT (3D image) and XR (2D image) in stone measurement accuracy.   Discussed unlikelihood of rapid stone growth within a month.   Mr. Oden to bring kidney stones to next appointment for analysis.   Ordered renal and bladder US and XR for December appointment.    Continue potassium citrate daily.   Follow up in December.             This note was generated with the assistance of ambient listening technology. Verbal consent was obtained by the patient and accompanying visitor(s) for the recording of patient appointment to facilitate this note. I attest to having reviewed and edited the generated note for accuracy, though some syntax or spelling errors may persist. Please contact the author of this note for any clarification.              The following assessment plan was created by [employer] via ambient listening:  Assessment & Plan    E29.1 Hypogonadism in male  N20.0 Kidney stone    IMPRESSION:   Reviewed recent imaging studies to assess status of kidney stones.   Evaluated possibility of remaining stones in right ureter based on previous imaging findings.   Assessed PSA monitoring necessity in context of testosterone therapy.   Determined need for endocrinology referral for management of hypogonadism and anemia.   Evaluated current stone prevention regimen, including use of potassium citrate.    Please review the short plan as above for concise and accurate plan. The dictated/AI generated plan may have some inaccuracies .    PLAN SUMMARY:   Order CBC, PSA, and testosterone tests for November   Continue potassium citrate daily   Referral to endocrinology for hypogonadism management   Order renal and bladder ultrasound and X-ray for December   Contact office regarding insurance coverage for May 28th PSA test   Follow-up in December; bring kidney stones for analysis    HYPOGONADISM IN MALE:   Referred to endocrinology for hypogonadism management.   Ordered CBC, PSA, and testosterone tests for November.   Contact the office regarding insurance coverage issue for PSA test from May 28th.    KIDNEY STONE:   Explained difference between CT (3D image) and XR (2D image) in stone measurement accuracy.   Discussed unlikelihood of rapid stone growth within a month.   Mr. Oden to  bring kidney stones to next appointment for analysis.   Ordered renal and bladder US and XR for December appointment.   Continue potassium citrate daily.   Follow up in December.             Portions of this note was generated with the assistance of ambient listening technology. Verbal consent was obtained by the patient and accompanying visitor(s) for the recording of patient appointment to facilitate this note. I attest to having reviewed and edited the generated note for accuracy, though some syntax or spelling errors may persist. Please contact the author of this note for any clarification.    Visit today included increased complexity associated with the care of the episodic problem addressed and managing the longitudinal care of the patient due to the serious and/or complex managed problem(s)

## 2025-07-08 ENCOUNTER — CLINICAL SUPPORT (OUTPATIENT)
Dept: REHABILITATION | Facility: HOSPITAL | Age: 70
End: 2025-07-08
Payer: MEDICARE

## 2025-07-08 DIAGNOSIS — M25.532 LEFT WRIST PAIN: Primary | ICD-10-CM

## 2025-07-08 PROCEDURE — 97530 THERAPEUTIC ACTIVITIES: CPT | Mod: PN

## 2025-07-08 PROCEDURE — 97110 THERAPEUTIC EXERCISES: CPT | Mod: PN

## 2025-07-08 PROCEDURE — 97022 WHIRLPOOL THERAPY: CPT | Mod: PN

## 2025-07-08 NOTE — PROGRESS NOTES
"                                  Occupational Therapy Daily Treatment Note       Date: 7/10/2025  Name: Ang Oden Jr.  Clinic Number: [id number]    Therapy Diagnosis: G56.02(ICD-10-CM)-Carpal tunnel syndrome of left wrist; Z98.890(ICD-10-CM)-Postoperative state; M25.532(ICD-10-CM)-Left wrist pain  Encounter Diagnosis   Name Primary?    Left wrist pain Yes     Physician: Carmen Field PA-C    Physician Orders: G56.02(ICD-10-CM)-Carpal tunnel syndrome of left wrist; Z98.890(ICD-10-CM)-Postoperative state; M25.532(ICD-10-CM)-Left wrist pain; evaluate and treat; NONWEIGHTBEARING; A/PROM; scar massage   Medical Diagnosis: G56.02(ICD-10-CM)-Carpal tunnel syndrome of left wrist; Z98.890(ICD-10-CM)-Postoperative state; M25.532(ICD-10-CM)-Left wrist pain  Surgical Procedure and Date: status post (Left) carpal tunnel release, 06/04/2025    Evaluation Date: 06/24/2025  Insurance Authorization Period Expiration: 06/23/2025-12/31/2025  Plan of Care Certification Period: 06/24/2025-09/24/2025  Date of Return to MD: 07/16/2025    Evaluation FOTO: 06/24/2025 = 59%    Visit # / Visits authorized: 2 / 10  Time In: 7:45  Time Out: 8:30    Total Billable Time:  40 minutes    Precautions:  Standard;  NONWEIGHTBEARING; A/PROM; scar massage       Post Op:  06/04/2025 = 5 weeks, 1 days      Subjective     Patient reports: "I used my hands a lot yesterday when I went crabbing and I am feeling stiff today."     Pain: 5/10   Location of pain: (Left) wrist     Objective    Patient seen by Occupational Therapy this session. Tx consisted of:      Supervised modalities after being cleared for contradictions  x    10 minutes:     -Fluidotherapy to  (Left)   hand to increase blood flow, circulation, tissue elasticity, desensitization, sensory re-education and for pain management  x 10 minutes.        Therapeutic Exercises to improve functional performance while increasing strength, endurance, range of motion,  and flexibility  x    10 " minutes:    -Scar Massage to  volar aspect of (Left) wrist region around incision site  with  mini therapeutic vibrator // mini dowel massager  to decrease dense scar adhesions and improve tensile glide  x 1 minutes.     -Manual Therapy techniques to (Left) thumb and wrist  including joint mobilizations, stretching, and Passive Range of Motion to increase joint mobility, range of motion  and for pain management  x  4 minutes     -Soft Tissue Mobilization to (Left) hand into wrist and forearm  from distal to proximal to decrease edema and increase range of motion and functional abilities  x  1  minutes    - Active Assistive Range of Motion for (Left) wrist in all planes x 10 repetitions  -  0# Dorsiflexion/Volar flexion (Supination /Pronation ), Radial Deviation / Ulnar Deviation x 10 repetitions  -  0# dowel for Supination /Pronation  x 10 repetitions   - Velcro board with LARGE dowel for Dorsal Flexion / Volar Flexion x 10 repetitions  - RED Theraputty for rolling x 10 repetitions       Therapeutic Activities  to improve functional performance while increasing independence with ADLs & IADLs  x    20 minutes:    -Ultrasound applied to volar aspect of (Left) wrist around scar tissue  on   3.0 Mhz with 0.7 w/cm2 @ 100 % duty cycle to decrease pain and inflammation  /////  to remold scar tissue and increase tissue elasticity x 8 minutes.      - Colored bead UNlacing with thumb to base of 5th digit x 10 beads  - Mini colored pegboard for Fine Motor Coordination and in-hand manipulation  (5 in-hand) x 20 pegs   - Wrist roller coaster for Active Range of Motion  of wrist in all planes x 10 repetitions   - Weighted ball on tabletop for Dorsiflexion /Volar Flexion  stretches x 10 repetitions  - YELLOW (non weighted) ball [place] for Supination/Pronation stretch (elbow Extension ) x 10 reps -NP  - Wrist wheel for Supination / Pronation stretch x 10 repetitions   - Wrist wheel for Radial Deviation / Ulnar Deviation stretches x 10  "repetitions   - RED digiflex for isolated x 10 repetitions;  GREEN digiflex for composite digital flexion x 20 repetitions      -NP = Not Performed     Initiate in future therapy sessions:    - Golf balls for in hand manipulation and Fine Motor Coordination x 1 minute     - ### Hand X-Trainer for thumb Abduction x 5 repetitions   -  ###Progressive Hand Gripper (black spring) for composite  x 20 repetitions   - ### Theraputty for (Bilateral) pulling, PVC for "cookie cutting" and medicine top x 10  Repetitions      - Wooden pegboard with ### clothespins with 3PT pinch x 2 repetitions   - YELLOW digiweb for composite digital Extension and  intrinsics x 20 repetitions      - Velcro checkerboard (on wedge) with "dart throwing" pattern x 1 repetition    - Low load prolonged stretches for Dorsiflexion /Volar Flexion with ### leg weight  (Table edge) x 30 seconds each x 2 repetitions      - ### Flexbar for Supination /Pronation  and Dorsiflexion /Volar Flexion x 10 repetitions    - ### Wrist exerstick in standing for Dorsiflexion / Volar Flexion  x 3 repetitions          Assessment     Patient will continue to benefit from skilled Occupational Therapy intervention to increase functional abilities, range of motion, and strength and pain control.  Patient. demonstrated proper understanding of each exercise.  Patient continues to require verbal and tactile cues for throughout therapy session to maintain position and prevent compensation.   Patient continues to be limited in functional and leisurely pursuits. Pain limits patient's participation in activities of daily living.  Patient is not able to carryout necessary vocational tasks.   Patient's spiritual, cultural and educational needs considered and patient agreeable to plan of care and goals.  Patient is making good progress towards established goals.  Patient tolerated exercises / activities within pain tolerance.   Reviewed Home Exercise Program with patient., see " "EPIC under "patient instructions" for provided exercises, activity modifications and limitations, modalities for home pain management.  Patient. demo understanding of above.    Patient. tolerated Fluidotherapy & US with no irritation.      Patient tolerated addition of  Velcro board with LARGE dowel for Dorsal Flexion / Volar Flexion; digiflex for isolated and composite flexion, Theraputty for rolling with no reported pain.     Active Range of Motion: wrist        (pre session)                  (Right)   //  (Left)  Dorsal Flexion /Volar Flexion: 55/60  //  38 (+6)  / 44 (+5)   Radial Deviation /Ulnar Deviation:  20/25  //  20 (+2)  / 12 (+2)   Supination / Pronation: 80/85  //  78 (+3)  / 83 (+1)     Patient demo increased AROM for (Left) wrist  Dorsal Flexion, Volar Flexion, Radial Deviation, Ulnar Deviation, Supination, and Pronation.     New/Revised Goals: Continue Plan Of Care   Goals:  1)   Patient to be Independent with Home exercise program and modalities for pain management by 1 week. (MET)   2)   Patient will report   3/10 pain on average with activity to assist with exercises by 6-8 weeks. (In Progress)   3)   Patient will increase range of Motion by 3-5 degrees to increase functional use for activities of daily living by 6-8 weeks. (In Progress)   4)   Patient will decrease edema by 0.1-0.3 millimeters  to increase joint mobility /flexibility by 6-8 weeks.  (In Progress)     Plan      Continue 2x week during the 90 day certification period   06/24/2025   to 09/24/2025   with established Plan Of Care in pursuit of Occupational Therapy goals.      Maribel Wolf OT        "

## 2025-07-10 ENCOUNTER — CLINICAL SUPPORT (OUTPATIENT)
Dept: REHABILITATION | Facility: HOSPITAL | Age: 70
End: 2025-07-10
Payer: MEDICARE

## 2025-07-10 DIAGNOSIS — M25.532 LEFT WRIST PAIN: Primary | ICD-10-CM

## 2025-07-10 PROCEDURE — 97530 THERAPEUTIC ACTIVITIES: CPT | Mod: PN

## 2025-07-10 PROCEDURE — 97110 THERAPEUTIC EXERCISES: CPT | Mod: PN

## 2025-07-10 PROCEDURE — 97022 WHIRLPOOL THERAPY: CPT | Mod: PN

## 2025-07-10 NOTE — PROGRESS NOTES
"                                  Occupational Therapy Daily Treatment Note       Date: 7/15/2025  Name: Ang Oden Jr.  Clinic Number: [id number]    Therapy Diagnosis: G56.02(ICD-10-CM)-Carpal tunnel syndrome of left wrist; Z98.890(ICD-10-CM)-Postoperative state; M25.532(ICD-10-CM)-Left wrist pain  Encounter Diagnosis   Name Primary?    Left wrist pain Yes     Physician: Carmen Field PA-C    Physician Orders: G56.02(ICD-10-CM)-Carpal tunnel syndrome of left wrist; Z98.890(ICD-10-CM)-Postoperative state; M25.532(ICD-10-CM)-Left wrist pain; evaluate and treat; NONWEIGHTBEARING; A/PROM; scar massage   Medical Diagnosis: G56.02(ICD-10-CM)-Carpal tunnel syndrome of left wrist; Z98.890(ICD-10-CM)-Postoperative state; M25.532(ICD-10-CM)-Left wrist pain  Surgical Procedure and Date: status post (Left) carpal tunnel release, 06/04/2025    Evaluation Date: 06/24/2025  Insurance Authorization Period Expiration: 06/23/2025-12/31/2025  Plan of Care Certification Period: 06/24/2025-09/24/2025  Date of Return to MD: 07/16/2025    Evaluation FOTO: 06/24/2025 = 59%    Visit # / Visits authorized: 3 / 10  Time In: 10:30  Time Out: 11:15     Total Billable Time:  40 minutes    Precautions:  Standard;  NONWEIGHTBEARING; A/PROM; scar massage       Post Op:  06/04/2025 = 5 weeks, 6 days      Subjective     Patient reports: "I can now open containers.  The pain is less today.  I don't have pain when I don't use or move my hand."     Pain: 1-2/10   Location of pain: (Left) wrist     Objective    Patient seen by Occupational Therapy this session. Tx consisted of:      Supervised modalities after being cleared for contradictions  x    10 minutes:     -Fluidotherapy to  (Left)   hand to increase blood flow, circulation, tissue elasticity, desensitization, sensory re-education and for pain management  x 10 minutes.        Therapeutic Exercises to improve functional performance while increasing strength, endurance, range of motion,  " and flexibility  x    10 minutes:    -Scar Massage to  volar aspect of (Left) wrist region around incision site  with  mini therapeutic vibrator // mini dowel massager  to decrease dense scar adhesions and improve tensile glide  x 1 minutes.     -Manual Therapy techniques to (Left) thumb and wrist  including joint mobilizations, stretching, and Passive Range of Motion to increase joint mobility, range of motion  and for pain management  x  4 minutes     -Soft Tissue Mobilization to (Left) hand into wrist and forearm  from distal to proximal to decrease edema and increase range of motion and functional abilities  x  1  minutes    - Active Assistive Range of Motion for (Left) wrist in all planes x 10 repetitions  -  0# Dorsiflexion/Volar flexion (Supination /Pronation ), Radial Deviation / Ulnar Deviation x 10 repetitions  -  0# dowel for Supination /Pronation  x 10 repetitions   - Velcro board with LARGE dowel for Dorsal Flexion / Volar Flexion x 10 repetitions  - RED Theraputty for rolling x 10 repetitions       Therapeutic Activities  to improve functional performance while increasing independence with ADLs & IADLs  x    20 minutes:    -Ultrasound applied to volar aspect of (Left) wrist around scar tissue  on   3.0 Mhz with 0.7 w/cm2 @ 100 % duty cycle to decrease pain and inflammation  /////  to remold scar tissue and increase tissue elasticity x 8 minutes.      - Colored bead UNlacing with thumb to base of 5th digit x 10 beads  - Mini colored pegboard for Fine Motor Coordination and in-hand manipulation  (5 in-hand) x 20 pegs   - Wrist roller coaster for Active Range of Motion  of wrist in all planes x 10 repetitions   - Weighted ball on tabletop for Dorsiflexion /Volar Flexion  stretches x 10 repetitions  - YELLOW (non weighted) ball [place] for Supination/Pronation stretch (elbow Extension ) x 10 reps -NP  - Wrist wheel for Supination / Pronation stretch x 10 repetitions -NP  - Wrist wheel for Radial Deviation /  "Ulnar Deviation stretches x 10 repetitions   - RED digiflex for isolated x 10 repetitions;  GREEN digiflex for composite digital flexion x 20 repetitions    - RED Theraputty with PVC for "cookie cutting" and medicine top x 10 repetitions     -NP = Not Performed     Initiate in future therapy sessions:    - Golf balls for in hand manipulation and Fine Motor Coordination x 1 minute     - ### Hand X-Trainer for thumb Abduction x 5 repetitions   -  ###Progressive Hand Gripper (black spring) for composite  x 20 repetitions   - ### Theraputty for (Bilateral) pulling x 10  Repetitions      - Wooden pegboard with ### clothespins with 3PT pinch x 2 repetitions   - YELLOW digiweb for composite digital Extension and  intrinsics x 20 repetitions      - Velcro checkerboard (on wedge) with "dart throwing" pattern x 1 repetition    - Low load prolonged stretches for Dorsiflexion /Volar Flexion with ### leg weight  (Table edge) x 30 seconds each x 2 repetitions      - ### Flexbar for Supination /Pronation  and Dorsiflexion /Volar Flexion x 10 repetitions    - ### Wrist exerstick in standing for Dorsiflexion / Volar Flexion  x 3 repetitions          Assessment     Patient will continue to benefit from skilled Occupational Therapy intervention to increase functional abilities, range of motion, and strength and pain control.  Patient. demonstrated proper understanding of each exercise.  Patient continues to require verbal and tactile cues for throughout therapy session to maintain position and prevent compensation.   Patient continues to be limited in functional and leisurely pursuits. Pain limits patient's participation in activities of daily living.  Patient is not able to carryout necessary vocational tasks.   Patient's spiritual, cultural and educational needs considered and patient agreeable to plan of care and goals.  Patient is making good progress towards established goals.  Patient tolerated exercises / activities within " "pain tolerance.   Reviewed Home Exercise Program with patient., see EPIC under "patient instructions" for provided exercises, activity modifications and limitations, modalities for home pain management.  Patient. demo understanding of above.    Patient. tolerated Fluidotherapy & US with no irritation.      Patient tolerated addition of   Theraputty with PVC for "cookie cutting" and medicine top with no reported pain.     Active Range of Motion: wrist        (pre session)                  (Right)   //  (Left)  Dorsal Flexion /Volar Flexion: 55/60  //  40 (+2)  / 47 (+3)   Radial Deviation /Ulnar Deviation:  20/25  //  20  / 13 (+1)   Supination / Pronation: 80/85  //  80 (+2)  / 84 (+1)     Patient demo increased AROM for (Left) wrist  Dorsal Flexion, Volar Flexion,  Ulnar Deviation, Supination, and Pronation.     New/Revised Goals: Continue Plan Of Care   Goals:  1)   Patient to be Independent with Home exercise program and modalities for pain management by 1 week. (MET)   2)   Patient will report   3/10 pain on average with activity to assist with exercises by 6-8 weeks. (In Progress)   3)   Patient will increase range of Motion by 3-5 degrees to increase functional use for activities of daily living by 6-8 weeks. (In Progress)   4)   Patient will decrease edema by 0.1-0.3 millimeters  to increase joint mobility /flexibility by 6-8 weeks.  (In Progress)     Plan      Continue 2x week during the 90 day certification period   06/24/2025   to 09/24/2025   with established Plan Of Care in pursuit of Occupational Therapy goals.      Maribel Wolf, OT          "

## 2025-07-15 ENCOUNTER — CLINICAL SUPPORT (OUTPATIENT)
Dept: REHABILITATION | Facility: HOSPITAL | Age: 70
End: 2025-07-15
Payer: MEDICARE

## 2025-07-15 DIAGNOSIS — M25.532 LEFT WRIST PAIN: Primary | ICD-10-CM

## 2025-07-15 PROCEDURE — 97022 WHIRLPOOL THERAPY: CPT | Mod: PN

## 2025-07-15 PROCEDURE — 97110 THERAPEUTIC EXERCISES: CPT | Mod: PN

## 2025-07-15 PROCEDURE — 97530 THERAPEUTIC ACTIVITIES: CPT | Mod: PN

## 2025-07-15 NOTE — PROGRESS NOTES
"                                  Occupational Therapy Daily Treatment Note       Date: 7/17/2025  Name: Ang Oden Jr.  Clinic Number: [id number]    Therapy Diagnosis: G56.02(ICD-10-CM)-Carpal tunnel syndrome of left wrist; Z98.890(ICD-10-CM)-Postoperative state; M25.532(ICD-10-CM)-Left wrist pain  Encounter Diagnosis   Name Primary?    Left wrist pain Yes     Physician: Carmen Field PA-C    Physician Orders: G56.02(ICD-10-CM)-Carpal tunnel syndrome of left wrist; Z98.890(ICD-10-CM)-Postoperative state; M25.532(ICD-10-CM)-Left wrist pain; evaluate and treat; NONWEIGHTBEARING; A/PROM; scar massage   Medical Diagnosis: G56.02(ICD-10-CM)-Carpal tunnel syndrome of left wrist; Z98.890(ICD-10-CM)-Postoperative state; M25.532(ICD-10-CM)-Left wrist pain  Surgical Procedure and Date: status post (Left) carpal tunnel release, 06/04/2025    Evaluation Date: 06/24/2025  Insurance Authorization Period Expiration: 06/23/2025-12/31/2025  Plan of Care Certification Period: 06/24/2025-09/24/2025  Date of Return to MD: as needed    Evaluation FOTO: 06/24/2025 = 59%    Visit # / Visits authorized: 4 / 10  Time In: 7:45  Time Out: 8:25     Total Billable Time:  40 minutes    Precautions:  Standard;  A/PROM; scar massage; weightbearing as tolerated ( per follow up on 07/16/2025)       Post Op:  06/04/2025 = 6 weeks, 1 days      Subjective     Patient reports: "I went back for a follow up yesterday and she said everything was looking good and we could start strengthening."     Pain: 1-2/10   Location of pain: (Left) wrist     Objective    Patient seen by Occupational Therapy this session. Tx consisted of:      Supervised modalities after being cleared for contradictions  x    10 minutes:     -Fluidotherapy to  (Left)   hand to increase blood flow, circulation, tissue elasticity, desensitization, sensory re-education and for pain management  x 10 minutes.        Therapeutic Exercises to improve functional performance while " increasing strength, endurance, range of motion,  and flexibility  x    10 minutes:    -Scar Massage to  volar aspect of (Left) wrist region around incision site  with  mini therapeutic vibrator // mini dowel massager  to decrease dense scar adhesions and improve tensile glide  x 1 minutes.     -Manual Therapy techniques to (Left) thumb and wrist  including joint mobilizations, stretching, and Passive Range of Motion to increase joint mobility, range of motion  and for pain management  x  4 minutes     -Soft Tissue Mobilization to (Left) hand into wrist and forearm  from distal to proximal to decrease edema and increase range of motion and functional abilities  x  1  minutes    - Active Assistive Range of Motion for (Left) wrist in all planes x 10 repetitions  -  0# Dorsiflexion/Volar flexion (Supination /Pronation ), Radial Deviation / Ulnar Deviation x 10 repetitions  -NP  -  0# dowel for Supination /Pronation  x 10 repetitions   - Velcro board with LARGE dowel for Dorsal Flexion / Volar Flexion x 10 repetitions  - RED Theraputty for rolling x 10 repetitions   - YELLOW Flexbar for Supination /Pronation  and Dorsiflexion /Volar Flexion x 10 repetitions       Therapeutic Activities  to improve functional performance while increasing independence with ADLs & IADLs  x    20 minutes:    -Ultrasound applied to volar aspect of (Left) wrist around scar tissue  on   3.0 Mhz with 0.7 w/cm2 @ 100 % duty cycle to decrease pain and inflammation  /////  to remold scar tissue and increase tissue elasticity x 8 minutes.      - Colored bead UNlacing with thumb to base of 5th digit x 10 beads  - Mini colored pegboard for Fine Motor Coordination and in-hand manipulation  (5 in-hand) x 20 pegs   - Wrist roller coaster for Active Range of Motion  of wrist in all planes x 10 repetitions -NP  - Weighted ball on tabletop for Dorsiflexion /Volar Flexion  stretches x 10 repetitions  - Wrist wheel for Supination / Pronation stretch x 10  "repetitions -NP  - Wrist wheel for Radial Deviation / Ulnar Deviation stretches x 10 repetitions   - RED digiflex for isolated x 10 repetitions;  GREEN digiflex for composite digital flexion x 20 repetitions    - RED Theraputty with PVC for "cookie cutting" and medicine top x 10 repetitions   - 25# Progressive Hand Gripper (black spring) for composite  x 20 repetitions    -Updated Home Exercise Program: Patient provided with and educated on written Home Exercise Program with YELLOW Theraputty for rolling, 3 Point Pinch, and composite .  See EMR under "patient instructions."  Patient demo understanding of above.      -NP = Not Performed     Initiate in future therapy sessions:    - Golf balls for in hand manipulation and Fine Motor Coordination x 1 minute     - ### Hand X-Trainer for thumb Abduction x 5 repetitions      - ### Theraputty for (Bilateral) pulling x 10  Repetitions      - Wooden pegboard with ### clothespins with 3PT pinch x 2 repetitions   - YELLOW digiweb for composite digital Extension and  intrinsics x 20 repetitions      - ### Wrist exerstick in standing for Dorsiflexion / Volar Flexion  x 3 repetitions          Assessment     Patient will continue to benefit from skilled Occupational Therapy intervention to increase functional abilities, range of motion, and strength and pain control.  Patient. demonstrated proper understanding of each exercise.  Patient continues to require verbal and tactile cues for throughout therapy session to maintain position and prevent compensation.   Patient continues to be limited in functional and leisurely pursuits. Pain limits patient's participation in activities of daily living.  Patient is not able to carryout necessary vocational tasks.   Patient's spiritual, cultural and educational needs considered and patient agreeable to plan of care and goals.  Patient is making good progress towards established goals.  Patient tolerated exercises / activities within " "pain tolerance.   Reviewed Home Exercise Program with patient., see EPIC under "patient instructions" for provided exercises, activity modifications and limitations, modalities for home pain management.  Patient. demo understanding of above.    Patient. tolerated Fluidotherapy & US with no irritation.      Patient tolerated addition of  Progressive Hand Gripper for composite ,  Flexbar for Supination/ Pronation, Dorsal Flexion, Volar Flexion with no reported pain.     Active Range of Motion: wrist        (pre session)                  (Right)   //  (Left)  Dorsal Flexion /Volar Flexion: 55/60  //  41 (+1)  / 49 (+2)   Radial Deviation /Ulnar Deviation:  20/25  //  20  / 15 (+2)   Supination / Pronation: 80/85  //  80 / 84     Patient demo increased AROM for (Left) wrist   Dorsal Flexion, Volar Flexion,  Ulnar Deviation.     New/Revised Goals: Continue Plan Of Care   Goals:  1)   Patient to be Independent with Home exercise program and modalities for pain management by 1 week. (MET)   2)   Patient will report   3/10 pain on average with activity to assist with exercises by 6-8 weeks. (In Progress)   3)   Patient will increase range of Motion by 3-5 degrees to increase functional use for activities of daily living by 6-8 weeks. (In Progress)   4)   Patient will decrease edema by 0.1-0.3 millimeters  to increase joint mobility /flexibility by 6-8 weeks.  (In Progress)     Plan      Continue 2x week during the 90 day certification period   06/24/2025   to 09/24/2025   with established Plan Of Care in pursuit of Occupational Therapy goals.      Maribel Wolf, OT            "

## 2025-07-16 ENCOUNTER — OFFICE VISIT (OUTPATIENT)
Facility: CLINIC | Age: 70
End: 2025-07-16
Payer: MEDICARE

## 2025-07-16 VITALS — BODY MASS INDEX: 28.58 KG/M2 | HEIGHT: 67 IN | WEIGHT: 182.13 LBS

## 2025-07-16 DIAGNOSIS — G56.02 CARPAL TUNNEL SYNDROME OF LEFT WRIST: ICD-10-CM

## 2025-07-16 DIAGNOSIS — Z98.890 POSTOPERATIVE STATE: Primary | ICD-10-CM

## 2025-07-16 PROCEDURE — 99999 PR PBB SHADOW E&M-EST. PATIENT-LVL III: CPT | Mod: PBBFAC,,,

## 2025-07-16 PROCEDURE — 99213 OFFICE O/P EST LOW 20 MIN: CPT | Mod: PBBFAC

## 2025-07-16 RX ORDER — MELOXICAM 15 MG/1
15 TABLET ORAL DAILY
Qty: 30 TABLET | Refills: 2 | Status: SHIPPED | OUTPATIENT
Start: 2025-07-16 | End: 2025-10-14

## 2025-07-16 NOTE — PROGRESS NOTES
Dr. Silva is the supervising physician for this encounter/patient    Ang Oden Jr. presents for post-operative evaluation.  The patient is now 6 weeks s/p left open carpal tunnel release with Dr. Silva on 6/4/25.  Overall the patient reports doing well, and he reports a 2/10 pain at rest and a 4/10 pain with movement.  He does note his left thumb is still constantly numb, but he reports his index and long finger paresthesias have improved significantly since surgery. The patient is taking nothing for post operative pain control. He admits to improving range of motion of the left hand with occupational therapy 2 times a week.  He notes he has been able to perform activities of daily living with minimal pain, but he does note mild pillar pain to the palm of the left hand.  He further reports mild pain with active wrist flexion.  He presents today for his second postoperative evaluation with no further complaints.    PE:  AA&O x 4.  NAD  HEENT:  NCAT, sclera nonicteric  Lungs:  Respirations are equal and unlabored.  CV:  2+ bilateral upper and lower extremity pulses.  MSK: The wound is healing well with no signs of erythema or warmth.  There is no drainage.  No clinical signs or symptoms of infection are present. The patient is able to make a full composite fist with the left hand.  Wrist range-of-motion is mildly restricted due to known radiocarpal joint arthritic change.  He is neurovascularly intact to left upper extremity with the exception of decreased sensation to light touch of the left thumb.    A/P: Status post above, doing well  1) He may begin weight-bearing as tolerated to the left upper extremity.  Meloxicam 15 mg tablets sent to patient's pharmacy today with 2 refills.  I advised the patient to not take additional NSAIDs with this medication.  I further advised him to take this medication with some food to prevent gastric upset.  2) Continue active and passive range of motion exercises.  Continue  occupational therapy for gentle range of motion of the left hand.  3) Continue gentle scar massage with Mederma, vitamin-E oil, cocoa butter  4) F/U as needed or sooner for any problems.  5) Call with any questions/concerns in the interim    Disclaimer:  This note is prepared using voice recognition software and as such is likely to have errors and has not been proof read. Please contact me for questions.     Carmen Field PA-C  Orthopedic/Hand Surgery

## 2025-07-17 ENCOUNTER — CLINICAL SUPPORT (OUTPATIENT)
Dept: REHABILITATION | Facility: HOSPITAL | Age: 70
End: 2025-07-17
Payer: MEDICARE

## 2025-07-17 DIAGNOSIS — M25.532 LEFT WRIST PAIN: Primary | ICD-10-CM

## 2025-07-17 PROCEDURE — 97530 THERAPEUTIC ACTIVITIES: CPT | Mod: PN

## 2025-07-17 PROCEDURE — 97022 WHIRLPOOL THERAPY: CPT | Mod: PN

## 2025-07-17 PROCEDURE — 97110 THERAPEUTIC EXERCISES: CPT | Mod: PN

## 2025-07-17 NOTE — PROGRESS NOTES
"                                  Occupational Therapy Progress Note       Date: 7/24/2025  Name: Ang Oden Jr.  Clinic Number: [id number]    Therapy Diagnosis: G56.02(ICD-10-CM)-Carpal tunnel syndrome of left wrist; Z98.890(ICD-10-CM)-Postoperative state; M25.532(ICD-10-CM)-Left wrist pain  Encounter Diagnosis   Name Primary?    Left wrist pain Yes     Physician: Carmen Field PA-C    Physician Orders: G56.02(ICD-10-CM)-Carpal tunnel syndrome of left wrist; Z98.890(ICD-10-CM)-Postoperative state; M25.532(ICD-10-CM)-Left wrist pain; evaluate and treat; NONWEIGHTBEARING; A/PROM; scar massage   Medical Diagnosis: G56.02(ICD-10-CM)-Carpal tunnel syndrome of left wrist; Z98.890(ICD-10-CM)-Postoperative state; M25.532(ICD-10-CM)-Left wrist pain  Surgical Procedure and Date: status post (Left) carpal tunnel release, 06/04/2025    Evaluation Date: 06/24/2025  Insurance Authorization Period Expiration: 06/23/2025-12/31/2025  Plan of Care Certification Period: 07/24/2025-10/24/2025  Date of Return to MD: as needed    Evaluation FOTO: 06/24/2025 = 59%  Reassessment FOTO: 07/24/2025 = 35%    Visit # / Visits authorized: 5 / 10  Time In: 7:45  Time Out: 8:30   Total Billable Time:  40 minutes    Precautions:  Standard;  A/PROM; scar massage; weightbearing as tolerated ( per follow up on 07/16/2025)       Post Op:  06/04/2025 = 7 weeks, 1 days      Subjective     Patient reports: "I am feeling a little sore today.  I can carry groceries in now with a little pain.  I am getting pain around the scar and the scar is still hard.  I am also having soreness in my bog joints (MCPs) "     Pain: 3-4/10   Location of pain: (Left) wrist     Objective    Patient seen by Occupational Therapy this session. Tx consisted of:      Supervised modalities after being cleared for contradictions  x    10 minutes:     -Fluidotherapy to  (Left)   hand to increase blood flow, circulation, tissue elasticity, desensitization, sensory re-education " and for pain management  x 10 minutes.        Therapeutic Exercises to improve functional performance while increasing strength, endurance, range of motion,  and flexibility  x    10 minutes:    -Scar Massage to  volar aspect of (Left) wrist region around incision site  with  mini therapeutic vibrator // mini dowel massager  to decrease dense scar adhesions and improve tensile glide  x 1 minutes.     -Manual Therapy techniques to (Left) thumb and wrist  including joint mobilizations, stretching, and Passive Range of Motion to increase joint mobility, range of motion  and for pain management  x  4 minutes     -Soft Tissue Mobilization to (Left) hand into wrist and forearm  from distal to proximal to decrease edema and increase range of motion and functional abilities  x  1  minutes    - Active Assistive Range of Motion for (Left) wrist in all planes x 10 repetitions  -  0# Dorsiflexion/Volar flexion (Supination /Pronation ), Radial Deviation / Ulnar Deviation x 10 repetitions  -NP  -  0# dowel for Supination /Pronation  x 10 repetitions   - Velcro board with LARGE dowel for Dorsal Flexion / Volar Flexion x 10 repetitions  - RED Theraputty for rolling x 10 repetitions   - YELLOW Flexbar for Supination /Pronation  and Dorsiflexion /Volar Flexion x 10 repetitions       Therapeutic Activities  to improve functional performance while increasing independence with ADLs & IADLs  x    20 minutes:    -Ultrasound applied to volar aspect of (Left) wrist around scar tissue  on   3.0 Mhz with 0.7 w/cm2 @ 100 % duty cycle to decrease pain and inflammation  /////  to remold scar tissue and increase tissue elasticity x 8 minutes.      - Colored bead UNlacing with thumb to base of 5th digit x 10 beads  - Mini colored pegboard for Fine Motor Coordination and in-hand manipulation  (5 in-hand) x 20 pegs   - Wrist roller coaster for Active Range of Motion  of wrist in all planes x 10 repetitions -NP  - Weighted ball on tabletop for  "Dorsiflexion /Volar Flexion  stretches x 10 repetitions  - Wrist wheel for Supination / Pronation stretch x 10 repetitions -NP      Patient reassessed functional abilities, edema, range of motion, assessed Manual Muscle Testing,  and pinch strength.     Patient provided with and educated on silicone scar pad for scar management.  Patient demo understanding of above.        -NP = Not Performed     Initiate in future therapy sessions:    - Wrist wheel for Radial Deviation / Ulnar Deviation stretches x 10 repetitions   - RED digiflex for isolated x 10 repetitions;  GREEN digiflex for composite digital flexion x 20 repetitions    - RED Theraputty with PVC for "cookie cutting" and medicine top x 10 repetitions   - 25# Progressive Hand Gripper (black spring) for composite  x 20 repetitions    - Golf balls for in hand manipulation and Fine Motor Coordination x 1 minute     - ### Hand X-Trainer for thumb Abduction x 5 repetitions      - ### Theraputty for (Bilateral) pulling x 10  Repetitions      - Wooden pegboard with ### clothespins with 3PT pinch x 2 repetitions   - YELLOW digiweb for composite digital Extension and  intrinsics x 20 repetitions      - ### Wrist exerstick in standing for Dorsiflexion / Volar Flexion  x 3 repetitions      Patient reassessed as follows:     Edema:   centimeters (Right) / (Left)  Metacarpals:  22.0  / 22.2 (-0.2)   Proximal Wrist Crease:  17.4  / 18.9 (-0.0)                                       Active Range of Motion: hand  Patient able to actively make complete (Bilateral) fists      Thumb:                           (Right)  //   (Left)  Opposition: 0.0  //  0.0 (+1.0)    Comments: (Measured with wrist goni from thumb tip to base of 5th metacarpal) centimeters                                                    Passive Range of Motion: hand           Thumb:                           (Left)    Opposition: 0.0  Comments: (Measured with wrist goni from thumb tip to base of 5th " "metacarpal) centimeters                                   Active Range of Motion: wrist                         (Right)   //  (Left)  Dorsal Flexion /Volar Flexion: 55/60  //  40 (+15)  / 45 (+18)   Radial Deviation /Ulnar Deviation:  20/25  //  18 (+2)  / 12 (+3)   Supination / Pronation: 80/85  //  80 (+10)  / 85 (+5)     Passive Range of Motion: wrist        (submaximally, within pain tolerance)              (Left)   Dorsal Flexion /Volar Flexion:  45 (+15)  / 55 (+19)   Radial Deviation /Ulnar Deviation: WFL / 17 (+6)   Supination / Pronation: WFL / WFL      Manual Muscle Test:  Wrist     (submaximally, within pain tolerance)      (Left)    Dorsal Flexion:  3/5  Volar Flexion:  3+/5  Radial Deviation:  3+/5  Ulnar Deviation: 3+/5       Strength: (NIKOLAS Dynamometer in pounds),Position II  (submaximally, within pain tolerance)   (Right):   80  (Left): 45    Pinch Strength: (Pinch Gauge in pounds)  (submaximally, within pain tolerance)              (Right) /  (Left)  Lateral Pinch:  19 / 14  3 Point Pinch:  18 / 11  2 Point Pinch:  14 / 10    Assessment     Patient will continue to benefit from skilled Occupational Therapy intervention to increase functional abilities, range of motion, and strength and pain control.  Patient. demonstrated proper understanding of each exercise.  Patient continues to require verbal and tactile cues for throughout therapy session to maintain position and prevent compensation.   Patient continues to be limited in functional and leisurely pursuits. Pain limits patient's participation in activities of daily living.  Patient is not able to carryout necessary vocational tasks.   Patient's spiritual, cultural and educational needs considered and patient agreeable to plan of care and goals.  Patient is making good progress towards established goals.  Patient tolerated exercises / activities within pain tolerance.   Reviewed Home Exercise Program with patient., see EPIC under "patient " "instructions" for provided exercises, activity modifications and limitations, modalities for home pain management.  Patient. demo understanding of above.    Patient. tolerated Fluidotherapy & US with no irritation.         Patient demo decreased edema in (Left) metacarpals and remained the same in Proximal Wrist Crease; increased Active range of motion for (Left) thumb opposition; increased AROM for (Left) wrist Dorsal Flexion, Volar Flexion, Radial Deviation, Ulnar Deviation, Supination, and Pronation; increased PROM for (Left) wrist Dorsal Flexion, Volar Flexion, Ulnar Deviation.     New/Revised Goals: Continue Plan Of Care   Goals:  1)   Patient to be Independent with Home exercise program and modalities for pain management by 1 week. (MET)   2)   Patient will report   2/10 pain on average with activity to assist with exercises by 6-8 weeks. (Revised 07/24/2025)   3)   Patient will increase range of Motion by 3-5 degrees to increase functional use for activities of daily living by 6-8 weeks. (In Progress)   4)   Patient will decrease edema in PWC by 0.1-0.3 millimeters  to increase joint mobility /flexibility by 6-8 weeks.  (Revised 07/24/2025)   5)   Patient will increase Manual Muscle Testing by a grade to A with fishing by 6-8 weeks. ( Added 07/24/2025 )   6)  Patient will increase  strength by 5-10 pounds to A with opening containers by 6-8 weeks.  ( Added 07/24/2025)   7)  Patient will increase pinch strength by 1-3 pounds to A with turning  key by 6-8 weeks.  ( Added 07/24/2025)     Plan      Continue 2x week during the 90 day certification period   07/24/2025   to 10/24/2025   with established Plan Of Care in pursuit of Occupational Therapy goals.      Maribel Wolf, OT              "

## 2025-07-19 DIAGNOSIS — E29.1 HYPOGONADISM IN MALE: ICD-10-CM

## 2025-07-21 RX ORDER — TESTOSTERONE 20.25 MG/1.25G
2 GEL TOPICAL DAILY
Qty: 60 PACKET | Refills: 3 | OUTPATIENT
Start: 2025-07-21

## 2025-07-24 ENCOUNTER — CLINICAL SUPPORT (OUTPATIENT)
Dept: REHABILITATION | Facility: HOSPITAL | Age: 70
End: 2025-07-24
Payer: MEDICARE

## 2025-07-24 DIAGNOSIS — M25.532 LEFT WRIST PAIN: Primary | ICD-10-CM

## 2025-07-24 PROCEDURE — 97110 THERAPEUTIC EXERCISES: CPT | Mod: PN

## 2025-07-24 PROCEDURE — 97530 THERAPEUTIC ACTIVITIES: CPT | Mod: PN

## 2025-07-24 PROCEDURE — 97022 WHIRLPOOL THERAPY: CPT | Mod: PN

## 2025-07-24 NOTE — PROGRESS NOTES
"                                  Occupational Therapy Daily Treatment Note       Date: 7/29/2025  Name: Ang Oden Jr.  Clinic Number: [id number]    Therapy Diagnosis: G56.02(ICD-10-CM)-Carpal tunnel syndrome of left wrist; Z98.890(ICD-10-CM)-Postoperative state; M25.532(ICD-10-CM)-Left wrist pain  Encounter Diagnosis   Name Primary?    Left wrist pain Yes     Physician: Carmen Field PA-C    Physician Orders: G56.02(ICD-10-CM)-Carpal tunnel syndrome of left wrist; Z98.890(ICD-10-CM)-Postoperative state; M25.532(ICD-10-CM)-Left wrist pain; evaluate and treat; NONWEIGHTBEARING; A/PROM; scar massage   Medical Diagnosis: G56.02(ICD-10-CM)-Carpal tunnel syndrome of left wrist; Z98.890(ICD-10-CM)-Postoperative state; M25.532(ICD-10-CM)-Left wrist pain  Surgical Procedure and Date: status post (Left) carpal tunnel release, 06/04/2025    Evaluation Date: 06/24/2025  Insurance Authorization Period Expiration: 06/23/2025-12/31/2025  Plan of Care Certification Period: 07/24/2025-10/24/2025  Date of Return to MD: as needed    Evaluation FOTO: 06/24/2025 = 59%  Reassessment FOTO: 07/24/2025 = 35%    Visit # / Visits authorized: 6 / 10  Time In: 7:30  Time Out: 8:15   Total Billable Time:  40 minutes    Precautions:  Standard;  A/PROM; scar massage; weightbearing as tolerated ( per follow up on 07/16/2025)       Post Op:  06/04/2025 = 7 weeks, 6 days      Subjective     Patient reports: "I have been sore in my fingers.  I think the putty might be too strong."     Pain: 3-4/10   Location of pain: (Left) wrist     Objective    Patient seen by Occupational Therapy this session. Tx consisted of:      Supervised modalities after being cleared for contradictions  x    10 minutes:     -Fluidotherapy to  (Left)   hand to increase blood flow, circulation, tissue elasticity, desensitization, sensory re-education and for pain management  x 10 minutes.        Therapeutic Exercises to improve functional performance while increasing " strength, endurance, range of motion,  and flexibility  x    10 minutes:    -Scar Massage to  volar aspect of (Left) wrist region around incision site  with  mini therapeutic vibrator // mini dowel massager  to decrease dense scar adhesions and improve tensile glide  x 1 minutes.     -Manual Therapy techniques to (Left) thumb and wrist  including joint mobilizations, stretching, and Passive Range of Motion to increase joint mobility, range of motion  and for pain management  x  4 minutes     -Soft Tissue Mobilization to (Left) hand into wrist and forearm  from distal to proximal to decrease edema and increase range of motion and functional abilities  x  1  minutes    - Active Assistive Range of Motion for (Left) wrist in all planes x 10 repetitions  -  0# Dorsiflexion/Volar flexion (Supination /Pronation ), Radial Deviation / Ulnar Deviation x 10 repetitions  -NP  -  0# dowel for Supination /Pronation  x 10 repetitions   - Velcro board with LARGE dowel for Dorsal Flexion / Volar Flexion x 10 repetitions  - RED Theraputty for rolling x 10 repetitions   - YELLOW Flexbar for Supination /Pronation  and Dorsiflexion /Volar Flexion x 10 repetitions       Therapeutic Activities  to improve functional performance while increasing independence with ADLs & IADLs  x    20 minutes:    -Ultrasound applied to volar aspect of (Left) wrist around scar tissue  on   3.0 Mhz with 0.7 w/cm2 @ 100 % duty cycle to decrease pain and inflammation  /////  to remold scar tissue and increase tissue elasticity x 8 minutes.      - Colored bead UNlacing with thumb to base of 5th digit x 10 beads  - Mini colored pegboard for Fine Motor Coordination and in-hand manipulation  (5 in-hand) x 20 pegs   - Wrist roller coaster for Active Range of Motion  of wrist in all planes x 10 repetitions -NP  - Weighted ball on tabletop for Dorsiflexion /Volar Flexion  stretches x 10 repetitions  - Wrist wheel for Supination / Pronation stretch x 10 repetitions  "-NP  - Wrist wheel for Radial Deviation / Ulnar Deviation stretches x 10 repetitions   - RED digiflex for isolated x 10 repetitions;  GREEN digiflex for composite digital flexion x 20 repetitions    - RED Theraputty with PVC for "cookie cutting" and medicine top x 10 repetitions                (putty tools NEXT)   - 25# Progressive Hand Gripper (black spring) for composite  x 20 repetitions  - Wooden pegboard with RED clothespins with 3PT pinch x 2 repetitions       Patient provided with and educated on BLUE sponge squeezes for composite .  Patient demo understanding of above.        -NP = Not Performed     Initiate in future therapy sessions:        - Golf balls for in hand manipulation and Fine Motor Coordination x 1 minute     - ### Hand X-Trainer for thumb Abduction x 5 repetitions      - ### Theraputty for (Bilateral) pulling x 10  Repetitions      - YELLOW digiweb for composite digital Extension and  intrinsics x 20 repetitions      - ### Wrist exerstick in standing for Dorsiflexion / Volar Flexion  x 3 repetitions          Assessment     Patient will continue to benefit from skilled Occupational Therapy intervention to increase functional abilities, range of motion, and strength and pain control.  Patient. demonstrated proper understanding of each exercise.  Patient continues to require verbal and tactile cues for throughout therapy session to maintain position and prevent compensation.   Patient continues to be limited in functional and leisurely pursuits. Pain limits patient's participation in activities of daily living.  Patient is not able to carryout necessary vocational tasks.   Patient's spiritual, cultural and educational needs considered and patient agreeable to plan of care and goals.  Patient is making good progress towards established goals.  Patient tolerated exercises / activities within pain tolerance.   Reviewed Home Exercise Program with patient., see EPIC under "patient instructions" " for provided exercises, activity modifications and limitations, modalities for home pain management.  Patient. demo understanding of above.    Patient. tolerated Fluidotherapy & US with no irritation.      Patient tolerated addition of wooden pegboard with clothespins for 3 Point Pinch with no reported pain.     Active Range of Motion: wrist      (pre session)                    (Right)   //  (Left)  Dorsal Flexion /Volar Flexion: 55/60  //  40 (+0)  / 47 (+2)   Radial Deviation /Ulnar Deviation:  20/25  //  18 (+0)  / 13 (+1)        Patient demo increased AROM for (Left) wrist Volar Flexion, UD   ; remained the same for Dorsal Flexion and RD.     New/Revised Goals: Continue Plan Of Care   Goals:  1)   Patient to be Independent with Home exercise program and modalities for pain management by 1 week. (MET)   2)   Patient will report   2/10 pain on average with activity to assist with exercises by 6-8 weeks. (Revised 07/24/2025)   3)   Patient will increase range of Motion by 3-5 degrees to increase functional use for activities of daily living by 6-8 weeks. (In Progress)   4)   Patient will decrease edema in PWC by 0.1-0.3 millimeters  to increase joint mobility /flexibility by 6-8 weeks.  (Revised 07/24/2025)   5)   Patient will increase Manual Muscle Testing by a grade to A with fishing by 6-8 weeks. ( Added 07/24/2025 )   6)  Patient will increase  strength by 5-10 pounds to A with opening containers by 6-8 weeks.  ( Added 07/24/2025)   7)  Patient will increase pinch strength by 1-3 pounds to A with turning  key by 6-8 weeks.  ( Added 07/24/2025)     Plan      Continue 2x week during the 90 day certification period   07/24/2025   to 10/24/2025   with established Plan Of Care in pursuit of Occupational Therapy goals.      Maribel Wolf, OT

## 2025-07-29 ENCOUNTER — CLINICAL SUPPORT (OUTPATIENT)
Dept: REHABILITATION | Facility: HOSPITAL | Age: 70
End: 2025-07-29
Payer: MEDICARE

## 2025-07-29 DIAGNOSIS — M25.532 LEFT WRIST PAIN: Primary | ICD-10-CM

## 2025-07-29 PROCEDURE — 97530 THERAPEUTIC ACTIVITIES: CPT | Mod: PN

## 2025-07-29 PROCEDURE — 97110 THERAPEUTIC EXERCISES: CPT | Mod: PN

## 2025-07-29 PROCEDURE — 97022 WHIRLPOOL THERAPY: CPT | Mod: PN

## 2025-07-29 NOTE — PROGRESS NOTES
"                                  Occupational Therapy Daily Treatment Note       Date: 8/7/2025  Name: Ang Oden Jr.  Clinic Number: [id number]    Therapy Diagnosis: G56.02(ICD-10-CM)-Carpal tunnel syndrome of left wrist; Z98.890(ICD-10-CM)-Postoperative state; M25.532(ICD-10-CM)-Left wrist pain  Encounter Diagnosis   Name Primary?    Left wrist pain Yes     Physician: Carmen Field PA-C    Physician Orders: G56.02(ICD-10-CM)-Carpal tunnel syndrome of left wrist; Z98.890(ICD-10-CM)-Postoperative state; M25.532(ICD-10-CM)-Left wrist pain; evaluate and treat; NONWEIGHTBEARING; A/PROM; scar massage   Medical Diagnosis: G56.02(ICD-10-CM)-Carpal tunnel syndrome of left wrist; Z98.890(ICD-10-CM)-Postoperative state; M25.532(ICD-10-CM)-Left wrist pain  Surgical Procedure and Date: status post (Left) carpal tunnel release, 06/04/2025    Evaluation Date: 06/24/2025  Insurance Authorization Period Expiration: 06/23/2025-12/31/2025  Plan of Care Certification Period: 07/24/2025-10/24/2025  Date of Return to MD: as needed    Evaluation FOTO: 06/24/2025 = 59%  Reassessment FOTO: 07/24/2025 = 35%    Visit # / Visits authorized: 7 / 10  Time In: 7:45  Time Out: 8:30     Total Billable Time:  40 minutes    Precautions:  Standard;  A/PROM; scar massage; weightbearing as tolerated ( per follow up on 07/16/2025)       Post Op:  06/04/2025 = 9 weeks, 1 days      Subjective     Patient reports: "I think I might have over used my hand and I am in more pain today."     Pain: 5/10   Location of pain: (Left) wrist     Objective    Patient seen by Occupational Therapy this session. Tx consisted of:      Supervised modalities after being cleared for contradictions  x    10 minutes:     -Fluidotherapy to  (Left)   hand to increase blood flow, circulation, tissue elasticity, desensitization, sensory re-education and for pain management  x 10 minutes.        Therapeutic Exercises to improve functional performance while increasing " strength, endurance, range of motion,  and flexibility  x    10 minutes:    -Scar Massage to  volar aspect of (Left) wrist region around incision site  with  mini therapeutic vibrator // mini dowel massager  to decrease dense scar adhesions and improve tensile glide  x 1 minutes.     -Manual Therapy techniques to (Left) thumb and wrist  including joint mobilizations, stretching, and Passive Range of Motion to increase joint mobility, range of motion  and for pain management  x  4 minutes     -Soft Tissue Mobilization to (Left) hand into wrist and forearm  from distal to proximal to decrease edema and increase range of motion and functional abilities  x  1  minutes    - Applied Biofreeze to (Left) thumb region for pain management.     - Active Assistive Range of Motion for (Left) wrist in all planes x 10 repetitions  -  0# Dorsiflexion/Volar flexion (Supination /Pronation ), Radial Deviation / Ulnar Deviation x 10 repetitions  -NP  -  0# dowel for Supination /Pronation  x 10 repetitions  -NP  - Velcro board with LARGE dowel for Dorsal Flexion / Volar Flexion x 10 repetitions -NP  - RED Theraputty for rolling x 10 repetitions   - YELLOW Flexbar for Supination /Pronation  and Dorsiflexion /Volar Flexion x 10 repetitions       Therapeutic Activities  to improve functional performance while increasing independence with ADLs & IADLs  x    20 minutes:    -Ultrasound applied to volar aspect of (Left) wrist around scar tissue  on   3.0 Mhz with 0.7 w/cm2 @ 100 % duty cycle to decrease pain and inflammation  /////  to remold scar tissue and increase tissue elasticity x 8 minutes.      - Colored bead UNlacing with thumb to base of 5th digit x 10 beads  - Mini colored pegboard for Fine Motor Coordination and in-hand manipulation  (5 in-hand) x 20 pegs   - Wrist roller coaster for Active Range of Motion  of wrist in all planes x 10 repetitions -NP  - Weighted ball on tabletop for Dorsiflexion /Volar Flexion  stretches x 10  "repetitions -NP  - Wrist wheel for Supination / Pronation stretch x 10 repetitions -NP  - Wrist wheel for Radial Deviation / Ulnar Deviation stretches x 10 repetitions   - RED digiflex for isolated x 10 repetitions;  GREEN digiflex for composite digital flexion x 20 repetitions    - RED Theraputty with LARGE & SMALL putty tools simulating opening containers x 10 repetitions     - 25# Progressive Hand Gripper (black spring) for composite  x 20 repetitions  - Wooden pegboard with RED clothespins with 3PT pinch x 2 repetitions            -NP = Not Performed     Initiate in future therapy sessions:      - ### Theraputty with FLAT "key" tool simulating turning key (NEXT)     - Golf balls for in hand manipulation and Fine Motor Coordination x 1 minute     - ### Hand X-Trainer for thumb Abduction x 5 repetitions      - ### Theraputty for (Bilateral) pulling x 10  Repetitions      - YELLOW digiweb for composite digital Extension and  intrinsics x 20 repetitions      - ### Wrist exerstick in standing for Dorsiflexion / Volar Flexion  x 3 repetitions          Assessment     Patient will continue to benefit from skilled Occupational Therapy intervention to increase functional abilities, range of motion, and strength and pain control.  Patient. demonstrated proper understanding of each exercise.  Patient continues to require verbal and tactile cues for throughout therapy session to maintain position and prevent compensation.   Patient continues to be limited in functional and leisurely pursuits. Pain limits patient's participation in activities of daily living.  Patient is not able to carryout necessary vocational tasks.   Patient's spiritual, cultural and educational needs considered and patient agreeable to plan of care and goals.  Patient is making good progress towards established goals.  Patient tolerated exercises / activities within pain tolerance.   Reviewed Home Exercise Program with patient., see EPIC under " ""patient instructions" for provided exercises, activity modifications and limitations, modalities for home pain management.  Patient. demo understanding of above.    Patient. tolerated Fluidotherapy & US with no irritation.      Patient tolerated addition of Theraputty with LARGE & SMALL putty tools simulating opening containers with no reported pain.     Active Range of Motion: wrist      (pre session)                    (Right)   //  (Left)  Dorsal Flexion /Volar Flexion: 55/60  //  44 (+4)  / 49 (+2)   Radial Deviation /Ulnar Deviation:  20/25  //  20 (+2)  / 15 (+2)        Patient demo increased AROM for (Left) wrist Dorsal Flexion,  Volar Flexion, Radial Deviation, and Ulnar Deviation.     New/Revised Goals: Continue Plan Of Care   Goals:  1)   Patient to be Independent with Home exercise program and modalities for pain management by 1 week. (MET)   2)   Patient will report   2/10 pain on average with activity to assist with exercises by 6-8 weeks. (Revised 07/24/2025)   3)   Patient will increase range of Motion by 3-5 degrees to increase functional use for activities of daily living by 6-8 weeks. (In Progress)   4)   Patient will decrease edema in PWC by 0.1-0.3 millimeters  to increase joint mobility /flexibility by 6-8 weeks.  (Revised 07/24/2025)   5)   Patient will increase Manual Muscle Testing by a grade to A with fishing by 6-8 weeks. ( Added 07/24/2025 )   6)  Patient will increase  strength by 5-10 pounds to A with opening containers by 6-8 weeks.  ( Added 07/24/2025)   7)  Patient will increase pinch strength by 1-3 pounds to A with turning  key by 6-8 weeks.  ( Added 07/24/2025)     Plan      Continue 2x week during the 90 day certification period   07/24/2025   to 10/24/2025   with established Plan Of Care in pursuit of Occupational Therapy goals.      Maribel Wolf OT                  "

## 2025-07-30 DIAGNOSIS — E29.1 HYPOGONADISM IN MALE: ICD-10-CM

## 2025-07-30 RX ORDER — TESTOSTERONE 20.25 MG/1.25G
2 GEL TOPICAL DAILY
Qty: 60 PACKET | Refills: 3 | OUTPATIENT
Start: 2025-07-30

## 2025-08-04 PROBLEM — M25.532 LEFT WRIST PAIN: Status: RESOLVED | Noted: 2025-06-18 | Resolved: 2025-08-04

## 2025-08-07 ENCOUNTER — CLINICAL SUPPORT (OUTPATIENT)
Dept: REHABILITATION | Facility: HOSPITAL | Age: 70
End: 2025-08-07
Payer: MEDICARE

## 2025-08-07 DIAGNOSIS — M25.532 LEFT WRIST PAIN: Primary | ICD-10-CM

## 2025-08-07 PROCEDURE — 97530 THERAPEUTIC ACTIVITIES: CPT | Mod: PN

## 2025-08-07 PROCEDURE — 97110 THERAPEUTIC EXERCISES: CPT | Mod: PN

## 2025-08-07 PROCEDURE — 97022 WHIRLPOOL THERAPY: CPT | Mod: PN

## 2025-08-12 ENCOUNTER — CLINICAL SUPPORT (OUTPATIENT)
Dept: REHABILITATION | Facility: HOSPITAL | Age: 70
End: 2025-08-12
Payer: MEDICARE

## 2025-08-12 DIAGNOSIS — M79.642 LEFT HAND PAIN: Primary | ICD-10-CM

## 2025-08-12 PROCEDURE — 97110 THERAPEUTIC EXERCISES: CPT | Mod: PN

## 2025-08-12 PROCEDURE — 97022 WHIRLPOOL THERAPY: CPT | Mod: PN

## 2025-08-12 PROCEDURE — 97530 THERAPEUTIC ACTIVITIES: CPT | Mod: PN

## 2025-08-14 ENCOUNTER — CLINICAL SUPPORT (OUTPATIENT)
Dept: REHABILITATION | Facility: HOSPITAL | Age: 70
End: 2025-08-14
Payer: MEDICARE

## 2025-08-14 DIAGNOSIS — M79.642 LEFT HAND PAIN: Primary | ICD-10-CM

## 2025-08-14 PROCEDURE — 97022 WHIRLPOOL THERAPY: CPT | Mod: PN

## 2025-08-14 PROCEDURE — 97110 THERAPEUTIC EXERCISES: CPT | Mod: PN

## 2025-08-14 PROCEDURE — 97530 THERAPEUTIC ACTIVITIES: CPT | Mod: PN

## 2025-08-18 DIAGNOSIS — F41.1 GAD (GENERALIZED ANXIETY DISORDER): ICD-10-CM

## 2025-08-19 ENCOUNTER — CLINICAL SUPPORT (OUTPATIENT)
Dept: REHABILITATION | Facility: HOSPITAL | Age: 70
End: 2025-08-19
Payer: MEDICARE

## 2025-08-19 DIAGNOSIS — M79.642 LEFT HAND PAIN: Primary | ICD-10-CM

## 2025-08-19 PROCEDURE — 97110 THERAPEUTIC EXERCISES: CPT | Mod: PN

## 2025-08-19 PROCEDURE — 97022 WHIRLPOOL THERAPY: CPT | Mod: PN

## 2025-08-19 PROCEDURE — 97530 THERAPEUTIC ACTIVITIES: CPT | Mod: PN

## 2025-08-19 RX ORDER — CITALOPRAM 10 MG/1
10 TABLET ORAL DAILY
Qty: 90 TABLET | Refills: 1 | Status: SHIPPED | OUTPATIENT
Start: 2025-08-19 | End: 2025-08-20 | Stop reason: SDUPTHER

## 2025-08-20 ENCOUNTER — PATIENT MESSAGE (OUTPATIENT)
Dept: FAMILY MEDICINE | Facility: CLINIC | Age: 70
End: 2025-08-20
Payer: MEDICARE

## 2025-08-20 DIAGNOSIS — F41.1 GAD (GENERALIZED ANXIETY DISORDER): ICD-10-CM

## 2025-08-20 RX ORDER — CITALOPRAM 20 MG/1
20 TABLET ORAL DAILY
Qty: 90 TABLET | Refills: 0 | Status: SHIPPED | OUTPATIENT
Start: 2025-08-20

## 2025-09-01 DIAGNOSIS — E78.2 MIXED HYPERLIPIDEMIA: ICD-10-CM

## 2025-09-01 DIAGNOSIS — Z13.6 ENCOUNTER FOR SCREENING FOR CARDIOVASCULAR DISORDERS: ICD-10-CM

## 2025-09-02 ENCOUNTER — CLINICAL SUPPORT (OUTPATIENT)
Dept: REHABILITATION | Facility: HOSPITAL | Age: 70
End: 2025-09-02
Payer: MEDICARE

## 2025-09-02 DIAGNOSIS — M79.642 LEFT HAND PAIN: Primary | ICD-10-CM

## 2025-09-02 PROCEDURE — 97110 THERAPEUTIC EXERCISES: CPT | Mod: PN

## 2025-09-02 PROCEDURE — 97530 THERAPEUTIC ACTIVITIES: CPT | Mod: PN

## 2025-09-02 PROCEDURE — 97022 WHIRLPOOL THERAPY: CPT | Mod: PN

## 2025-09-03 RX ORDER — ATORVASTATIN CALCIUM 40 MG/1
40 TABLET, FILM COATED ORAL DAILY
Qty: 90 TABLET | Refills: 3 | Status: SHIPPED | OUTPATIENT
Start: 2025-09-03

## 2025-09-04 ENCOUNTER — CLINICAL SUPPORT (OUTPATIENT)
Dept: REHABILITATION | Facility: HOSPITAL | Age: 70
End: 2025-09-04
Payer: MEDICARE

## 2025-09-04 DIAGNOSIS — M79.642 LEFT HAND PAIN: Primary | ICD-10-CM

## 2025-09-04 PROCEDURE — 97022 WHIRLPOOL THERAPY: CPT | Mod: PN

## 2025-09-04 PROCEDURE — 97530 THERAPEUTIC ACTIVITIES: CPT | Mod: PN

## 2025-09-04 PROCEDURE — 97110 THERAPEUTIC EXERCISES: CPT | Mod: PN

## (undated) DEVICE — SOL IRR SOD CHL .9% POUR

## (undated) DEVICE — UNDERPAD ULTRASORB 300LB 30X36

## (undated) DEVICE — NDL HYPO STD REG BVL 22GX1.5IN

## (undated) DEVICE — SYR 10CC LUER LOCK

## (undated) DEVICE — BANDAGE MATRIX HK LOOP 2IN 5YD

## (undated) DEVICE — Device

## (undated) DEVICE — GOWN ECLIPSE REINF LVL4 TWL XL

## (undated) DEVICE — GLOVE BIOGEL PI MICRO SZ 7.5

## (undated) DEVICE — TOWEL OR DISP STRL BLUE 4/PK

## (undated) DEVICE — SPONGE COTTON TRAY 4X4IN

## (undated) DEVICE — DRESSING XEROFORM NONADH 1X8IN

## (undated) DEVICE — COVER CAMERA OPERATING ROOM

## (undated) DEVICE — TOURNIQUET SB QC DP 18X4IN

## (undated) DEVICE — BLADE SURG #15 CARBON STEEL

## (undated) DEVICE — SLING ARM LARGE FOAM STRAP

## (undated) DEVICE — COVER LIGHT HANDLE 80/CA

## (undated) DEVICE — SUT 2-0 VICRYL / CT-1

## (undated) DEVICE — FORCEP STRAIGHT DISP

## (undated) DEVICE — SUT 4-0 ETHILON 18 PS-2

## (undated) DEVICE — NDL HYPO STD REG BVL 18GX1.5IN

## (undated) DEVICE — GLOVE BIOGEL PI MICRO INDIC 7

## (undated) DEVICE — BLADE SAGITTAL FINE 5.5 X 18.5

## (undated) DEVICE — DRAPE SURG W/TWL 17 5/8X23

## (undated) DEVICE — PAD CAST 2 IN X 4YDS STERILE

## (undated) DEVICE — SEE MEDLINE ITEM 159592

## (undated) DEVICE — UNDERGLOVES BIOGEL PI SIZE 8

## (undated) DEVICE — DRESSING N ADH OIL EMUL 3X3

## (undated) DEVICE — GLOVE BIOGEL PI MICRO SZ 6.5

## (undated) DEVICE — GLOVE SENSICARE PI GRN 6.5

## (undated) DEVICE — GLOVE BIOGEL ECLIPSE SZ 7

## (undated) DEVICE — DRAPE STERI-DRAPE 1000 17X11IN